# Patient Record
Sex: FEMALE | Race: WHITE | NOT HISPANIC OR LATINO | Employment: FULL TIME | URBAN - METROPOLITAN AREA
[De-identification: names, ages, dates, MRNs, and addresses within clinical notes are randomized per-mention and may not be internally consistent; named-entity substitution may affect disease eponyms.]

---

## 2017-09-29 ENCOUNTER — OFFICE VISIT (OUTPATIENT)
Dept: FAMILY MEDICINE | Facility: CLINIC | Age: 47
End: 2017-09-29
Payer: COMMERCIAL

## 2017-09-29 ENCOUNTER — LAB VISIT (OUTPATIENT)
Dept: LAB | Facility: HOSPITAL | Age: 47
End: 2017-09-29
Attending: FAMILY MEDICINE
Payer: COMMERCIAL

## 2017-09-29 VITALS
HEART RATE: 84 BPM | SYSTOLIC BLOOD PRESSURE: 110 MMHG | BODY MASS INDEX: 35.63 KG/M2 | TEMPERATURE: 98 F | OXYGEN SATURATION: 97 % | WEIGHT: 188.69 LBS | DIASTOLIC BLOOD PRESSURE: 80 MMHG | HEIGHT: 61 IN

## 2017-09-29 DIAGNOSIS — Z12.31 ENCOUNTER FOR SCREENING MAMMOGRAM FOR BREAST CANCER: ICD-10-CM

## 2017-09-29 DIAGNOSIS — Z12.4 CERVICAL CANCER SCREENING: ICD-10-CM

## 2017-09-29 DIAGNOSIS — Z00.00 ANNUAL PHYSICAL EXAM: Primary | ICD-10-CM

## 2017-09-29 DIAGNOSIS — Z00.00 ANNUAL PHYSICAL EXAM: ICD-10-CM

## 2017-09-29 LAB
ALBUMIN SERPL BCP-MCNC: 3.6 G/DL
ALP SERPL-CCNC: 82 U/L
ALT SERPL W/O P-5'-P-CCNC: 16 U/L
ANION GAP SERPL CALC-SCNC: 7 MMOL/L
AST SERPL-CCNC: 19 U/L
BASOPHILS # BLD AUTO: 0.03 K/UL
BASOPHILS NFR BLD: 0.5 %
BILIRUB SERPL-MCNC: 0.6 MG/DL
BUN SERPL-MCNC: 7 MG/DL
CALCIUM SERPL-MCNC: 9 MG/DL
CHLORIDE SERPL-SCNC: 107 MMOL/L
CHOLEST SERPL-MCNC: 207 MG/DL
CHOLEST/HDLC SERPL: 5.6 {RATIO}
CO2 SERPL-SCNC: 25 MMOL/L
CREAT SERPL-MCNC: 0.9 MG/DL
DIFFERENTIAL METHOD: ABNORMAL
EOSINOPHIL # BLD AUTO: 0.1 K/UL
EOSINOPHIL NFR BLD: 1.7 %
ERYTHROCYTE [DISTWIDTH] IN BLOOD BY AUTOMATED COUNT: 13.8 %
EST. GFR  (AFRICAN AMERICAN): >60 ML/MIN/1.73 M^2
EST. GFR  (NON AFRICAN AMERICAN): >60 ML/MIN/1.73 M^2
GLUCOSE SERPL-MCNC: 79 MG/DL
HCT VFR BLD AUTO: 39.2 %
HDLC SERPL-MCNC: 37 MG/DL
HDLC SERPL: 17.9 %
HGB BLD-MCNC: 12.3 G/DL
LDLC SERPL CALC-MCNC: 137.6 MG/DL
LYMPHOCYTES # BLD AUTO: 1.7 K/UL
LYMPHOCYTES NFR BLD: 27.6 %
MCH RBC QN AUTO: 26.4 PG
MCHC RBC AUTO-ENTMCNC: 31.4 G/DL
MCV RBC AUTO: 84 FL
MONOCYTES # BLD AUTO: 0.4 K/UL
MONOCYTES NFR BLD: 6.5 %
NEUTROPHILS # BLD AUTO: 3.8 K/UL
NEUTROPHILS NFR BLD: 63.7 %
NONHDLC SERPL-MCNC: 170 MG/DL
PLATELET # BLD AUTO: 269 K/UL
PMV BLD AUTO: 10.8 FL
POTASSIUM SERPL-SCNC: 4.3 MMOL/L
PROT SERPL-MCNC: 6.9 G/DL
RBC # BLD AUTO: 4.66 M/UL
SODIUM SERPL-SCNC: 139 MMOL/L
TRIGL SERPL-MCNC: 162 MG/DL
TSH SERPL DL<=0.005 MIU/L-ACNC: 2.17 UIU/ML
WBC # BLD AUTO: 6.01 K/UL

## 2017-09-29 PROCEDURE — 99999 PR PBB SHADOW E&M-EST. PATIENT-LVL III: CPT | Mod: PBBFAC,,, | Performed by: FAMILY MEDICINE

## 2017-09-29 PROCEDURE — 80061 LIPID PANEL: CPT

## 2017-09-29 PROCEDURE — 36415 COLL VENOUS BLD VENIPUNCTURE: CPT | Mod: PO

## 2017-09-29 PROCEDURE — 88175 CYTOPATH C/V AUTO FLUID REDO: CPT

## 2017-09-29 PROCEDURE — 80053 COMPREHEN METABOLIC PANEL: CPT

## 2017-09-29 PROCEDURE — 84443 ASSAY THYROID STIM HORMONE: CPT

## 2017-09-29 PROCEDURE — 87624 HPV HI-RISK TYP POOLED RSLT: CPT

## 2017-09-29 PROCEDURE — 99396 PREV VISIT EST AGE 40-64: CPT | Mod: S$GLB,,, | Performed by: FAMILY MEDICINE

## 2017-09-29 PROCEDURE — 85025 COMPLETE CBC W/AUTO DIFF WBC: CPT

## 2017-09-29 NOTE — PROGRESS NOTES
Office Visit    Patient Name: Cheryl Espinosa    : 1970  MRN: [id number]    Subjective:  Cheryl is a 46 y.o. female who presents today for:    Annual Exam      This patient has multiple medical diagnoses as noted below.  This patient is known to me and to this clinic. Patient denies any new symptoms including chest pain, SOB, blurry vision, N/V, diarrhea.  She denies any new issues at this time.       Patient Active Problem List   Diagnosis    Osteopenia    History of endometriosis    GERD without esophagitis       History reviewed. No pertinent surgical history.    History reviewed. No pertinent family history.    Social History     Social History    Marital status: Single     Spouse name: N/A    Number of children: N/A    Years of education: N/A     Occupational History    Not on file.     Social History Main Topics    Smoking status: Never Smoker    Smokeless tobacco: Never Used    Alcohol use Yes    Drug use: No    Sexual activity: Not on file     Other Topics Concern    Not on file     Social History Narrative    No narrative on file       Current Medications  Medications reviewed and updated.     Allergies   Review of patient's allergies indicates:   Allergen Reactions    Nsaids (non-steroidal anti-inflammatory drug) Nausea And Vomiting     Nausea     Medrol [methylprednisolone] Rash         Labs  No results found for: LABA1C, HGBA1C  Lab Results   Component Value Date     2016    K 3.8 2016     2016    CO2 24 2016    BUN 10 2016    CREATININE 0.9 2016    CALCIUM 9.1 2016    ANIONGAP 9 2016    ESTGFRAFRICA >60.0 2016    EGFRNONAA >60.0 2016     No results found for: CHOL  No results found for: HDL  No results found for: LDLCALC  No results found for: TRIG  No results found for: CHOLHDL  Last set of blood work has been reviewed as noted above.    Review of Systems   Constitutional: Negative for activity  "change, appetite change, fatigue, fever and unexpected weight change.   HENT: Negative.  Negative for ear discharge, ear pain, rhinorrhea and sore throat.    Eyes: Negative.    Respiratory: Negative for apnea, cough, chest tightness, shortness of breath and wheezing.    Cardiovascular: Negative for chest pain, palpitations and leg swelling.   Gastrointestinal: Negative for abdominal distention, abdominal pain, constipation, diarrhea and vomiting.   Endocrine: Negative for cold intolerance, heat intolerance, polydipsia and polyuria.   Genitourinary: Negative for decreased urine volume, menstrual problem, urgency, vaginal bleeding, vaginal discharge and vaginal pain.   Musculoskeletal: Negative.    Skin: Negative for rash.   Neurological: Negative for dizziness and headaches.   Hematological: Does not bruise/bleed easily.   Psychiatric/Behavioral: Negative for agitation, sleep disturbance and suicidal ideas.       /80 (BP Location: Left arm, Patient Position: Sitting, BP Method: Large (Manual))   Pulse 84   Temp 98.1 °F (36.7 °C) (Oral)   Ht 5' 1" (1.549 m)   Wt 85.6 kg (188 lb 11.4 oz)   LMP 04/29/2017 (Approximate)   SpO2 97%   BMI 35.66 kg/m²      Physical Exam   Constitutional: She is oriented to person, place, and time. She appears well-developed and well-nourished.   HENT:   Head: Normocephalic and atraumatic.   Right Ear: External ear normal.   Left Ear: External ear normal.   Nose: Nose normal.   Mouth/Throat: Oropharynx is clear and moist.   Eyes: Conjunctivae and EOM are normal. Pupils are equal, round, and reactive to light.   Neck: Normal range of motion. No JVD present. No thyromegaly present.   Cardiovascular: Normal rate, regular rhythm and normal heart sounds.    Pulmonary/Chest: Effort normal and breath sounds normal. She has no wheezes. Right breast exhibits no inverted nipple, no mass, no nipple discharge, no skin change and no tenderness. Left breast exhibits no inverted nipple, no " mass, no nipple discharge, no skin change and no tenderness.   Abdominal: Soft. Bowel sounds are normal. She exhibits no distension. There is no tenderness.   Genitourinary: Vagina normal and uterus normal. Pelvic exam was performed with patient supine. There is no rash, tenderness or lesion on the right labia. There is no rash, tenderness, lesion or injury on the left labia. Cervix exhibits no discharge and no friability. Right adnexum displays no mass, no tenderness and no fullness. Left adnexum displays no mass, no tenderness and no fullness.   Musculoskeletal: Normal range of motion.   Lymphadenopathy:     She has no cervical adenopathy.   Neurological: She is alert and oriented to person, place, and time. She has normal reflexes.   Skin: Skin is warm and dry.   Psychiatric: She has a normal mood and affect. Her behavior is normal. Judgment and thought content normal.   Vitals reviewed.      Health Maintenance  Health Maintenance       Date Due Completion Date    Lipid Panel 1970 ---    TETANUS VACCINE 11/17/1990 ---    Pap Smear with HPV Cotest 11/17/1993 ---    Mammogram 11/17/2012 ---    Influenza Vaccine 08/01/2017 ---          Assessment/Plan:  Cheryl Espinosa is a 46 y.o. female who presents today for :    1. Annual physical exam    2. Encounter for screening mammogram for breast cancer    3. Cervical cancer screening        Problem List Items Addressed This Visit     None      Visit Diagnoses     Annual physical exam    -  Primary    Relevant Orders    CBC auto differential    Comprehensive metabolic panel    Lipid panel    TSH    Encounter for screening mammogram for breast cancer        Relevant Orders    Mammo Digital Screening Bilat with Tomosynthesis CAD    Cervical cancer screening        Relevant Orders    Liquid-based pap smear, screening    HPV High Risk Genotypes, PCR          Return in about 1 year (around 9/29/2018).     This note was created by combination of typed  and  Dragon dictation.  Transcription errors may be present.  If there are any questions, please contact me.

## 2017-10-02 ENCOUNTER — HOSPITAL ENCOUNTER (OUTPATIENT)
Dept: RADIOLOGY | Facility: HOSPITAL | Age: 47
Discharge: HOME | End: 2017-10-02
Attending: FAMILY MEDICINE
Payer: COMMERCIAL

## 2017-10-02 DIAGNOSIS — Z12.31 ENCOUNTER FOR SCREENING MAMMOGRAM FOR BREAST CANCER: ICD-10-CM

## 2017-10-02 PROCEDURE — 77063 BREAST TOMOSYNTHESIS BI: CPT | Mod: 26,,, | Performed by: RADIOLOGY

## 2017-10-02 PROCEDURE — 77067 SCR MAMMO BI INCL CAD: CPT | Mod: 26,,, | Performed by: RADIOLOGY

## 2017-10-02 PROCEDURE — 77067 SCR MAMMO BI INCL CAD: CPT | Mod: TC

## 2017-10-05 LAB
HPV HR 12 DNA CVX QL NAA+PROBE: NEGATIVE
HPV16 DNA SPEC QL NAA+PROBE: NEGATIVE
HPV18 DNA SPEC QL NAA+PROBE: NEGATIVE

## 2018-01-19 ENCOUNTER — OFFICE VISIT (OUTPATIENT)
Dept: FAMILY MEDICINE | Facility: CLINIC | Age: 48
End: 2018-01-19
Payer: COMMERCIAL

## 2018-01-19 VITALS
HEIGHT: 61 IN | WEIGHT: 195.13 LBS | OXYGEN SATURATION: 96 % | BODY MASS INDEX: 36.84 KG/M2 | DIASTOLIC BLOOD PRESSURE: 86 MMHG | HEART RATE: 110 BPM | SYSTOLIC BLOOD PRESSURE: 126 MMHG | TEMPERATURE: 98 F

## 2018-01-19 DIAGNOSIS — F41.9 ANXIETY: Primary | ICD-10-CM

## 2018-01-19 PROCEDURE — 99213 OFFICE O/P EST LOW 20 MIN: CPT | Mod: S$GLB,,, | Performed by: NURSE PRACTITIONER

## 2018-01-19 PROCEDURE — 99999 PR PBB SHADOW E&M-EST. PATIENT-LVL III: CPT | Mod: PBBFAC,,, | Performed by: NURSE PRACTITIONER

## 2018-01-19 NOTE — PROGRESS NOTES
Subjective:       Patient ID: Cheryl Espinosa is a 47 y.o. female.    Chief Complaint: Epistaxis    Epistaxis    The bleeding has been from both nares. This is a new problem. The current episode started in the past 7 days. She has experienced no nasal trauma. The problem occurs daily. The problem has been resolved. The bleeding is associated with nothing. She has tried nothing for the symptoms. The treatment provided no relief. There is no history of allergies, a bleeding disorder, colds, frequent nosebleeds or sinus problems.   Anxiety   Presents for initial visit. Onset was 1 to 4 weeks ago. The problem has been gradually worsening. Symptoms include decreased concentration, excessive worry, nervous/anxious behavior, restlessness and shortness of breath. Patient reports no chest pain, compulsions, confusion, depressed mood, dizziness, dry mouth, feeling of choking, hyperventilation, impotence, insomnia, irritability, malaise, muscle tension, nausea, obsessions, palpitations, panic or suicidal ideas. Symptoms occur constantly. The severity of symptoms is moderate. The symptoms are aggravated by work stress (Patient states she works in a stressful environment ). The quality of sleep is fair. Nighttime awakenings: occasional.     Risk factors: Work Stress. There is no history of anemia, anxiety/panic attacks, arrhythmia, asthma, bipolar disorder, CAD, CHF, chronic lung disease, depression, fibromyalgia, hyperthyroidism or suicide attempts. Past treatments include nothing.     Review of Systems   Constitutional: Negative for chills, diaphoresis, fatigue, fever and irritability.   HENT: Positive for nosebleeds. Negative for congestion, postnasal drip, rhinorrhea, sinus pressure and sneezing.    Respiratory: Positive for shortness of breath. Negative for cough, chest tightness and wheezing.    Cardiovascular: Negative for chest pain, palpitations and leg swelling.   Gastrointestinal: Negative for abdominal pain,  constipation, diarrhea, nausea and vomiting.   Genitourinary: Negative for dysuria, impotence, vaginal discharge and vaginal pain.   Musculoskeletal: Negative for arthralgias, back pain and myalgias.   Skin: Negative for color change and rash.   Neurological: Negative for dizziness, weakness, light-headedness and headaches.   Psychiatric/Behavioral: Positive for decreased concentration. Negative for confusion and suicidal ideas. The patient is nervous/anxious. The patient does not have insomnia.        Objective:      Physical Exam   Constitutional: She is oriented to person, place, and time. Vital signs are normal. She appears well-developed and well-nourished.   HENT:   Head: Normocephalic and atraumatic.   Right Ear: External ear normal.   Left Ear: External ear normal.   Nose: Nose normal.   Mouth/Throat: Oropharynx is clear and moist. No oropharyngeal exudate.   Cardiovascular: Normal rate, regular rhythm and normal heart sounds.    Pulmonary/Chest: Effort normal and breath sounds normal.   Neurological: She is alert and oriented to person, place, and time.   Skin: Skin is warm, dry and intact. Capillary refill takes less than 2 seconds.   Psychiatric: Judgment normal. Her mood appears anxious. Cognition and memory are normal.   Patient is crying through out the entire visit       Assessment:       1. Anxiety        Plan:       Cheryl was seen today for epistaxis.    Diagnoses and all orders for this visit:    Anxiety    Patient refused medication. Patient will see Dr Strong Feb. 9, 2018.  Home care  · Try to locate the sources of stress in your life. They may not be obvious. These may include:  ¨ Daily hassles of life (traffic jams, missed appointments, car troubles, etc.)  ¨ Major life changes, both good (new baby, job promotion) and bad (loss of job, loss of loved one)  ¨ Overload: feeling that you have too many responsibilities and can't take care of all of them at once  ¨ Feeling helpless, feeling  that your problems are beyond what youre able to solve  · Notice how your body reacts to stress. Learn to listen to your body signals. This will help you take action before the stress becomes severe.  · When you can, do something about the source of your stress. (Avoid hassles, limit the amount of change that happens in your life at one time and take a break when you feel overloaded).  · Unfortunately, many stressful situations can't be avoided. It is necessary to learn how to better manage stress. There are many proven methods that will reduce your anxiety. These include simple things like exercise, good nutrition and adequate rest. Also, there are certain techniques that are helpful:  ¨ Relaxation  ¨ Breathing exercises  ¨ Visualization  ¨ Biofeedback  ¨ Meditation  For more information about this, consult your doctor or go to a local bookstore and review the many books and tapes available on this subject.  Follow-up care  If you feel that your anxiety is not responding to self-help measures, contact your doctor or make an appointment with a counselor. You may need short-term psychological counseling and temporary medicine to help you manage stress.  Call 911  Call your healthcare provider right away if any of these occur:  · Trouble breathing  · Confusion  · Drowsiness or trouble wakening  · Fainting or loss of consciousness  · Rapid heart rate  · Seizure  · New chest pain that becomes more severe, lasts longer, or spreads into your shoulder, arm, neck, jaw, or back  When to seek medical advice  Call your healthcare provider right away if any of these occur:  · Your symptoms get worse  · Severe headache not relieved by rest and mild pain reliever  Date Last Reviewed: 9/29/2015 © 2000-2017 [employer]. [address] [place] [address], [place] [address]. All rights reserved. This information is not intended as a substitute for professional medical care. Always follow your healthcare professional's  instructions.

## 2018-01-19 NOTE — PATIENT INSTRUCTIONS
Anxiety Reaction  Anxiety is the feeling we all get when we think something bad might happen. It is a normal response to stress and usually causes only a mild reaction. When anxiety becomes more severe, it can interfere with daily life. In some cases, you may not even be aware of what it is youre anxious about. There may also be a genetic link or it may be a learned behavior in the home.  Both psychological and physical triggers cause stress reaction. It's often a response to fear or emotional stress, real or imagined. This stress may come from home, family, work, or social relationships.  During an anxiety reaction, you may feel:  · Helpless  · Nervous  · Depressed  · Irritable  Your body may show signs of anxiety in many ways. You may experience:  · Dry mouth  · Shakiness  · Dizziness  · Weakness  · Trouble breathing  · Breathing fast (hyperventilating)  · Chest pressure  · Sweating  · Headache  · Nausea  · Diarrhea  · Tiredness  · Inability to sleep  · Sexual problems  Home care  · Try to locate the sources of stress in your life. They may not be obvious. These may include:  ¨ Daily hassles of life (traffic jams, missed appointments, car troubles, etc.)  ¨ Major life changes, both good (new baby, job promotion) and bad (loss of job, loss of loved one)  ¨ Overload: feeling that you have too many responsibilities and can't take care of all of them at once  ¨ Feeling helpless, feeling that your problems are beyond what youre able to solve  · Notice how your body reacts to stress. Learn to listen to your body signals. This will help you take action before the stress becomes severe.  · When you can, do something about the source of your stress. (Avoid hassles, limit the amount of change that happens in your life at one time and take a break when you feel overloaded).  · Unfortunately, many stressful situations can't be avoided. It is necessary to learn how to better manage stress. There are many proven methods  that will reduce your anxiety. These include simple things like exercise, good nutrition and adequate rest. Also, there are certain techniques that are helpful:  ¨ Relaxation  ¨ Breathing exercises  ¨ Visualization  ¨ Biofeedback  ¨ Meditation  For more information about this, consult your doctor or go to a local bookstore and review the many books and tapes available on this subject.  Follow-up care  If you feel that your anxiety is not responding to self-help measures, contact your doctor or make an appointment with a counselor. You may need short-term psychological counseling and temporary medicine to help you manage stress.  Call 911  Call your healthcare provider right away if any of these occur:  · Trouble breathing  · Confusion  · Drowsiness or trouble wakening  · Fainting or loss of consciousness  · Rapid heart rate  · Seizure  · New chest pain that becomes more severe, lasts longer, or spreads into your shoulder, arm, neck, jaw, or back  When to seek medical advice  Call your healthcare provider right away if any of these occur:  · Your symptoms get worse  · Severe headache not relieved by rest and mild pain reliever  Date Last Reviewed: 9/29/2015  © 2404-7014 [employer]. [address] [place] [address]. All rights reserved. This information is not intended as a substitute for professional medical care. Always follow your healthcare professional's instructions.

## 2018-02-09 ENCOUNTER — OFFICE VISIT (OUTPATIENT)
Dept: FAMILY MEDICINE | Facility: CLINIC | Age: 48
End: 2018-02-09
Payer: COMMERCIAL

## 2018-02-09 VITALS
HEIGHT: 61 IN | SYSTOLIC BLOOD PRESSURE: 128 MMHG | WEIGHT: 190.94 LBS | DIASTOLIC BLOOD PRESSURE: 82 MMHG | TEMPERATURE: 98 F | HEART RATE: 93 BPM | OXYGEN SATURATION: 95 % | BODY MASS INDEX: 36.05 KG/M2

## 2018-02-09 DIAGNOSIS — Z87.42 HISTORY OF ENDOMETRIOSIS: ICD-10-CM

## 2018-02-09 DIAGNOSIS — N92.1 MENORRHAGIA WITH IRREGULAR CYCLE: ICD-10-CM

## 2018-02-09 DIAGNOSIS — Z23 NEED FOR TDAP VACCINATION: ICD-10-CM

## 2018-02-09 DIAGNOSIS — F41.8 DEPRESSION WITH ANXIETY: Primary | ICD-10-CM

## 2018-02-09 PROCEDURE — 3008F BODY MASS INDEX DOCD: CPT | Mod: S$GLB,,, | Performed by: FAMILY MEDICINE

## 2018-02-09 PROCEDURE — 99214 OFFICE O/P EST MOD 30 MIN: CPT | Mod: S$GLB,,, | Performed by: FAMILY MEDICINE

## 2018-02-09 PROCEDURE — 99999 PR PBB SHADOW E&M-EST. PATIENT-LVL III: CPT | Mod: PBBFAC,,, | Performed by: FAMILY MEDICINE

## 2018-02-09 RX ORDER — DIAZEPAM 5 MG/1
5 TABLET ORAL EVERY 6 HOURS PRN
Qty: 60 TABLET | Refills: 0 | Status: SHIPPED | OUTPATIENT
Start: 2018-02-09 | End: 2018-02-09 | Stop reason: SDUPTHER

## 2018-02-09 RX ORDER — DIAZEPAM 5 MG/1
5 TABLET ORAL EVERY 6 HOURS PRN
Qty: 60 TABLET | Refills: 0 | Status: SHIPPED | OUTPATIENT
Start: 2018-02-09 | End: 2019-03-15
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
## 2018-03-01 ENCOUNTER — TELEPHONE (OUTPATIENT)
Dept: FAMILY MEDICINE | Facility: CLINIC | Age: 48
End: 2018-03-01

## 2018-03-01 NOTE — TELEPHONE ENCOUNTER
----- Message from Dada Campos sent at 2/26/2018 10:42 AM CST -----  Contact: Cheryl [phone]  P.A is needed for the pt's medication:  diazePAM (VALIUM) 5 MG tablet . Please call at your earliest convenience.

## 2018-04-16 ENCOUNTER — PATIENT MESSAGE (OUTPATIENT)
Dept: FAMILY MEDICINE | Facility: CLINIC | Age: 48
End: 2018-04-16

## 2018-04-16 ENCOUNTER — TELEPHONE (OUTPATIENT)
Dept: FAMILY MEDICINE | Facility: CLINIC | Age: 48
End: 2018-04-16

## 2018-04-16 DIAGNOSIS — K21.9 GERD WITHOUT ESOPHAGITIS: Primary | ICD-10-CM

## 2018-04-16 RX ORDER — DIAZEPAM 5 MG/1
5 TABLET ORAL EVERY 6 HOURS PRN
Qty: 60 TABLET | Refills: 0 | Status: CANCELLED | OUTPATIENT
Start: 2018-04-16 | End: 2018-05-16

## 2018-04-16 RX ORDER — OMEPRAZOLE 40 MG/1
40 CAPSULE, DELAYED RELEASE ORAL EVERY MORNING
Qty: 90 CAPSULE | Refills: 0 | Status: SHIPPED | OUTPATIENT
Start: 2018-04-16 | End: 2018-04-17 | Stop reason: SDUPTHER

## 2018-04-16 NOTE — TELEPHONE ENCOUNTER
Patient notified prescription sent to the pharmacy,and the referral dept will contact her to schedule an appt for GI. Patient verbalized her understanding.

## 2018-04-16 NOTE — TELEPHONE ENCOUNTER
Patient notified PA approved for brand name Valium from 04/16/18 through 04/16/19. Patient verbalized her understanding.Reference # [id number]

## 2018-04-17 DIAGNOSIS — K21.9 GERD WITHOUT ESOPHAGITIS: ICD-10-CM

## 2018-04-17 RX ORDER — OMEPRAZOLE 40 MG/1
40 CAPSULE, DELAYED RELEASE ORAL EVERY MORNING
Qty: 90 CAPSULE | Refills: 3 | Status: SHIPPED | OUTPATIENT
Start: 2018-04-17 | End: 2020-10-26 | Stop reason: ALTCHOICE

## 2018-04-19 ENCOUNTER — TELEPHONE (OUTPATIENT)
Dept: FAMILY MEDICINE | Facility: CLINIC | Age: 48
End: 2018-04-19

## 2018-04-19 NOTE — TELEPHONE ENCOUNTER
I spoke with the patient regarding a referral to Gastroenterology, she refuse to schedule first available at [facility] and [place] locations. Patient was given the information regarding the gastro department and states that she will be doing research before scheduling.

## 2019-03-15 ENCOUNTER — OFFICE VISIT (OUTPATIENT)
Dept: FAMILY MEDICINE | Facility: CLINIC | Age: 49
End: 2019-03-15
Payer: COMMERCIAL

## 2019-03-15 ENCOUNTER — LAB VISIT (OUTPATIENT)
Dept: LAB | Facility: HOSPITAL | Age: 49
End: 2019-03-15
Attending: FAMILY MEDICINE
Payer: COMMERCIAL

## 2019-03-15 VITALS
BODY MASS INDEX: 34.93 KG/M2 | HEIGHT: 61 IN | OXYGEN SATURATION: 98 % | HEART RATE: 80 BPM | SYSTOLIC BLOOD PRESSURE: 110 MMHG | TEMPERATURE: 98 F | DIASTOLIC BLOOD PRESSURE: 70 MMHG | WEIGHT: 185 LBS

## 2019-03-15 DIAGNOSIS — Z00.00 ANNUAL PHYSICAL EXAM: Primary | ICD-10-CM

## 2019-03-15 DIAGNOSIS — Z12.31 ENCOUNTER FOR SCREENING MAMMOGRAM FOR BREAST CANCER: ICD-10-CM

## 2019-03-15 DIAGNOSIS — Z00.00 ANNUAL PHYSICAL EXAM: ICD-10-CM

## 2019-03-15 LAB
ALBUMIN SERPL BCP-MCNC: 4 G/DL
ALP SERPL-CCNC: 89 U/L
ALT SERPL W/O P-5'-P-CCNC: 17 U/L
ANION GAP SERPL CALC-SCNC: 7 MMOL/L
AST SERPL-CCNC: 19 U/L
BASOPHILS # BLD AUTO: 0.05 K/UL
BASOPHILS NFR BLD: 0.9 %
BILIRUB SERPL-MCNC: 0.4 MG/DL
BUN SERPL-MCNC: 16 MG/DL
CALCIUM SERPL-MCNC: 9.5 MG/DL
CHLORIDE SERPL-SCNC: 106 MMOL/L
CHOLEST SERPL-MCNC: 231 MG/DL
CHOLEST/HDLC SERPL: 4.9 {RATIO}
CO2 SERPL-SCNC: 26 MMOL/L
CREAT SERPL-MCNC: 0.9 MG/DL
DIFFERENTIAL METHOD: ABNORMAL
EOSINOPHIL # BLD AUTO: 0.1 K/UL
EOSINOPHIL NFR BLD: 1.7 %
ERYTHROCYTE [DISTWIDTH] IN BLOOD BY AUTOMATED COUNT: 13.2 %
EST. GFR  (AFRICAN AMERICAN): >60 ML/MIN/1.73 M^2
EST. GFR  (NON AFRICAN AMERICAN): >60 ML/MIN/1.73 M^2
ESTIMATED AVG GLUCOSE: 100 MG/DL
GLUCOSE SERPL-MCNC: 93 MG/DL
HBA1C MFR BLD HPLC: 5.1 %
HCT VFR BLD AUTO: 41.4 %
HDLC SERPL-MCNC: 47 MG/DL
HDLC SERPL: 20.3 %
HGB BLD-MCNC: 13 G/DL
IMM GRANULOCYTES # BLD AUTO: 0.01 K/UL
IMM GRANULOCYTES NFR BLD AUTO: 0.2 %
LDLC SERPL CALC-MCNC: 159.6 MG/DL
LYMPHOCYTES # BLD AUTO: 1.8 K/UL
LYMPHOCYTES NFR BLD: 31 %
MCH RBC QN AUTO: 26.7 PG
MCHC RBC AUTO-ENTMCNC: 31.4 G/DL
MCV RBC AUTO: 85 FL
MONOCYTES # BLD AUTO: 0.4 K/UL
MONOCYTES NFR BLD: 7.3 %
NEUTROPHILS # BLD AUTO: 3.5 K/UL
NEUTROPHILS NFR BLD: 58.9 %
NONHDLC SERPL-MCNC: 184 MG/DL
NRBC BLD-RTO: 0 /100 WBC
PLATELET # BLD AUTO: 317 K/UL
PMV BLD AUTO: 10.8 FL
POTASSIUM SERPL-SCNC: 4.4 MMOL/L
PROT SERPL-MCNC: 7.3 G/DL
RBC # BLD AUTO: 4.86 M/UL
SODIUM SERPL-SCNC: 139 MMOL/L
TRIGL SERPL-MCNC: 122 MG/DL
TSH SERPL DL<=0.005 MIU/L-ACNC: 1.41 UIU/ML
WBC # BLD AUTO: 5.87 K/UL

## 2019-03-15 PROCEDURE — 85025 COMPLETE CBC W/AUTO DIFF WBC: CPT

## 2019-03-15 PROCEDURE — 36415 COLL VENOUS BLD VENIPUNCTURE: CPT | Mod: PO

## 2019-03-15 PROCEDURE — 83036 HEMOGLOBIN GLYCOSYLATED A1C: CPT

## 2019-03-15 PROCEDURE — 99999 PR PBB SHADOW E&M-EST. PATIENT-LVL III: ICD-10-PCS | Mod: PBBFAC,,, | Performed by: FAMILY MEDICINE

## 2019-03-15 PROCEDURE — 84443 ASSAY THYROID STIM HORMONE: CPT

## 2019-03-15 PROCEDURE — 80061 LIPID PANEL: CPT

## 2019-03-15 PROCEDURE — 99396 PREV VISIT EST AGE 40-64: CPT | Mod: S$GLB,,, | Performed by: FAMILY MEDICINE

## 2019-03-15 PROCEDURE — 99999 PR PBB SHADOW E&M-EST. PATIENT-LVL III: CPT | Mod: PBBFAC,,, | Performed by: FAMILY MEDICINE

## 2019-03-15 PROCEDURE — 99396 PR PREVENTIVE VISIT,EST,40-64: ICD-10-PCS | Mod: S$GLB,,, | Performed by: FAMILY MEDICINE

## 2019-03-15 PROCEDURE — 80053 COMPREHEN METABOLIC PANEL: CPT

## 2019-03-15 NOTE — PROGRESS NOTES
Assessment & Plan  Problem List Items Addressed This Visit     None      Visit Diagnoses     Annual physical exam    -  Primary    Relevant Orders    CBC auto differential    Comprehensive metabolic panel    Lipid panel    Hemoglobin A1c    TSH    Mammo Digital Screening Bilat    Encounter for screening mammogram for breast cancer        Relevant Orders    Mammo Digital Screening Bilat      I addressed all major concerns as it related to health maintenance.  All were ordered and scheduled based on the patients wishes.  Any additional health maintenance will be readdressed at the next physical if declined or deferred by the patient.        Health Maintenance reviewed.    Follow-up: Follow-up in about 1 year (around 3/15/2020) for annual exam.    ______________________________________________________________________    Chief Complaint  Chief Complaint   Patient presents with    Annual Exam       HPI  Cheryl Espinosa is a 48 y.o. female with multiple medical diagnoses as listed in the medical history and problem list that presents for annual exam.  Pt is known to me with last appointment 2/9/2018.    Patient denies any new symptoms including chest pain, SOB, blurry vision, N/V, diarrhea.        PAST MEDICAL HISTORY:  Past Medical History:   Diagnosis Date    Carpal tunnel syndrome     GERD (gastroesophageal reflux disease)     History of degenerative disc disease     Metatarsalgia        PAST SURGICAL HISTORY:  History reviewed. No pertinent surgical history.    SOCIAL HISTORY:  Social History     Socioeconomic History    Marital status: Single     Spouse name: Not on file    Number of children: Not on file    Years of education: Not on file    Highest education level: Not on file   Social Needs    Financial resource strain: Not on file    Food insecurity - worry: Not on file    Food insecurity - inability: Not on file    Transportation needs - medical: Not on file    Transportation needs -  non-medical: Not on file   Occupational History    Not on file   Tobacco Use    Smoking status: Never Smoker    Smokeless tobacco: Never Used   Substance and Sexual Activity    Alcohol use: Yes    Drug use: No    Sexual activity: Not on file   Other Topics Concern    Not on file   Social History Narrative    Not on file       FAMILY HISTORY:  Family History   Problem Relation Age of Onset    Breast cancer Maternal Aunt     Breast cancer Maternal Grandmother        ALLERGIES AND MEDICATIONS: updated and reviewed.  Review of patient's allergies indicates:   Allergen Reactions    Nsaids (non-steroidal anti-inflammatory drug) Nausea And Vomiting     Nausea     Medrol [methylprednisolone] Rash     Current Outpatient Medications   Medication Sig Dispense Refill    omeprazole (PRILOSEC) 40 MG capsule Take 1 capsule (40 mg total) by mouth every morning. 90 capsule 3     No current facility-administered medications for this visit.          ROS  Review of Systems   Constitutional: Negative for activity change, appetite change, fatigue, fever and unexpected weight change.   HENT: Negative.  Negative for ear discharge, ear pain, rhinorrhea and sore throat.    Eyes: Negative.    Respiratory: Negative for apnea, cough, chest tightness, shortness of breath and wheezing.    Cardiovascular: Negative for chest pain, palpitations and leg swelling.   Gastrointestinal: Negative for abdominal distention, abdominal pain, constipation, diarrhea and vomiting.   Endocrine: Negative for cold intolerance, heat intolerance, polydipsia and polyuria.   Genitourinary: Negative for decreased urine volume and urgency.   Musculoskeletal: Negative.    Skin: Negative for rash.   Neurological: Negative for dizziness and headaches.   Hematological: Does not bruise/bleed easily.   Psychiatric/Behavioral: Negative for agitation, sleep disturbance and suicidal ideas.         Physical Exam  Vitals:    03/15/19 1353   BP: 110/70   BP Location:  "Left arm   Patient Position: Sitting   BP Method: Large (Manual)   Pulse: 80   Temp: 98.3 °F (36.8 °C)   TempSrc: Oral   SpO2: 98%   Weight: 83.9 kg (185 lb)   Height: 5' 1" (1.549 m)    Body mass index is 34.96 kg/m².  Weight: 83.9 kg (185 lb)   Height: 5' 1" (154.9 cm)   Physical Exam   Constitutional: She is oriented to person, place, and time. She appears well-developed and well-nourished.   HENT:   Head: Normocephalic and atraumatic.   Right Ear: External ear normal.   Left Ear: External ear normal.   Nose: Nose normal.   Mouth/Throat: Oropharynx is clear and moist.   Eyes: Conjunctivae and EOM are normal. Pupils are equal, round, and reactive to light.   Neck: Normal range of motion. No JVD present. No thyromegaly present.   Cardiovascular: Normal rate, regular rhythm and normal heart sounds.   Pulmonary/Chest: Effort normal and breath sounds normal. She has no wheezes.   Abdominal: Soft. Bowel sounds are normal. She exhibits no distension. There is no tenderness.   Musculoskeletal: Normal range of motion.   Lymphadenopathy:     She has no cervical adenopathy.   Neurological: She is alert and oriented to person, place, and time. She has normal reflexes.   Skin: Skin is warm and dry.   Psychiatric: She has a normal mood and affect. Her behavior is normal. Judgment and thought content normal.   Vitals reviewed.        Health Maintenance       Date Due Completion Date    TETANUS VACCINE 11/17/1990 ---    Influenza Vaccine 05/03/2019 (Originally 8/1/2018) 2/9/2018 (Declined)    Override on 2/9/2018: Declined    Mammogram 10/02/2019 10/2/2017    Pap Smear with HPV Cotest 09/29/2020 9/29/2017    Lipid Panel 09/29/2022 9/29/2017              Patient note was created using [employer]  Any errors in syntax or even information may not have been identified and edited on initial review prior to signing this note.  "

## 2019-03-18 ENCOUNTER — HOSPITAL ENCOUNTER (OUTPATIENT)
Dept: RADIOLOGY | Facility: HOSPITAL | Age: 49
Discharge: HOME | End: 2019-03-18
Attending: FAMILY MEDICINE
Payer: COMMERCIAL

## 2019-03-18 VITALS — WEIGHT: 185 LBS | BODY MASS INDEX: 34.93 KG/M2 | HEIGHT: 61 IN

## 2019-03-18 DIAGNOSIS — Z00.00 ANNUAL PHYSICAL EXAM: ICD-10-CM

## 2019-03-18 DIAGNOSIS — Z12.31 ENCOUNTER FOR SCREENING MAMMOGRAM FOR BREAST CANCER: ICD-10-CM

## 2019-03-18 PROCEDURE — 77063 MAMMO DIGITAL SCREENING BILAT WITH TOMOSYNTHESIS_CAD: ICD-10-PCS | Mod: 26,,, | Performed by: RADIOLOGY

## 2019-03-18 PROCEDURE — 77067 SCR MAMMO BI INCL CAD: CPT | Mod: 26,,, | Performed by: RADIOLOGY

## 2019-03-18 PROCEDURE — 77063 BREAST TOMOSYNTHESIS BI: CPT | Mod: 26,,, | Performed by: RADIOLOGY

## 2019-03-18 PROCEDURE — 77067 MAMMO DIGITAL SCREENING BILAT WITH TOMOSYNTHESIS_CAD: ICD-10-PCS | Mod: 26,,, | Performed by: RADIOLOGY

## 2019-03-18 PROCEDURE — 77067 SCR MAMMO BI INCL CAD: CPT | Mod: TC,PO

## 2019-03-18 NOTE — PROGRESS NOTES
Your cholesterol has elevated.  Have you increased your intake of fatty food?  I recommend that you follow a low fat diet.  Exercise can also aid in cholesterol control.  I know you have plans to increase exercise this year.

## 2019-05-17 ENCOUNTER — OCCUPATIONAL HEALTH (OUTPATIENT)
Dept: URGENT CARE | Facility: CLINIC | Age: 49
End: 2019-05-17

## 2019-05-17 DIAGNOSIS — Z02.1 ENCOUNTER FOR PRE-EMPLOYMENT DRUG TESTING: Primary | ICD-10-CM

## 2019-05-17 PROCEDURE — 80305 DRUG TEST PRSMV DIR OPT OBS: CPT | Mod: S$GLB,,, | Performed by: PHYSICIAN ASSISTANT

## 2019-05-17 PROCEDURE — 80305 PR COLLECTION ONLY DRUG SCREEN: ICD-10-PCS | Mod: S$GLB,,, | Performed by: PHYSICIAN ASSISTANT

## 2019-11-18 ENCOUNTER — OFFICE VISIT (OUTPATIENT)
Dept: FAMILY MEDICINE | Facility: CLINIC | Age: 49
End: 2019-11-18
Payer: COMMERCIAL

## 2019-11-18 VITALS
HEART RATE: 66 BPM | TEMPERATURE: 99 F | OXYGEN SATURATION: 99 % | WEIGHT: 199.06 LBS | SYSTOLIC BLOOD PRESSURE: 124 MMHG | HEIGHT: 61 IN | BODY MASS INDEX: 37.58 KG/M2 | DIASTOLIC BLOOD PRESSURE: 70 MMHG

## 2019-11-18 DIAGNOSIS — Z23 NEED FOR DIPHTHERIA-TETANUS-PERTUSSIS (TDAP) VACCINE: ICD-10-CM

## 2019-11-18 DIAGNOSIS — J01.90 ACUTE BACTERIAL SINUSITIS: Primary | ICD-10-CM

## 2019-11-18 DIAGNOSIS — B96.89 ACUTE BACTERIAL SINUSITIS: Primary | ICD-10-CM

## 2019-11-18 PROCEDURE — 90471 IMMUNIZATION ADMIN: CPT | Mod: 59,S$GLB,, | Performed by: NURSE PRACTITIONER

## 2019-11-18 PROCEDURE — 90715 TDAP VACCINE GREATER THAN OR EQUAL TO 7YO IM: ICD-10-PCS | Mod: S$GLB,,, | Performed by: NURSE PRACTITIONER

## 2019-11-18 PROCEDURE — 99999 PR PBB SHADOW E&M-EST. PATIENT-LVL IV: ICD-10-PCS | Mod: PBBFAC,,, | Performed by: NURSE PRACTITIONER

## 2019-11-18 PROCEDURE — 99214 OFFICE O/P EST MOD 30 MIN: CPT | Mod: 25,S$GLB,, | Performed by: NURSE PRACTITIONER

## 2019-11-18 PROCEDURE — 99214 PR OFFICE/OUTPT VISIT, EST, LEVL IV, 30-39 MIN: ICD-10-PCS | Mod: 25,S$GLB,, | Performed by: NURSE PRACTITIONER

## 2019-11-18 PROCEDURE — 90471 TDAP VACCINE GREATER THAN OR EQUAL TO 7YO IM: ICD-10-PCS | Mod: 59,S$GLB,, | Performed by: NURSE PRACTITIONER

## 2019-11-18 PROCEDURE — 3008F PR BODY MASS INDEX (BMI) DOCUMENTED: ICD-10-PCS | Mod: CPTII,S$GLB,, | Performed by: NURSE PRACTITIONER

## 2019-11-18 PROCEDURE — 99999 PR PBB SHADOW E&M-EST. PATIENT-LVL IV: CPT | Mod: PBBFAC,,, | Performed by: NURSE PRACTITIONER

## 2019-11-18 PROCEDURE — 3008F BODY MASS INDEX DOCD: CPT | Mod: CPTII,S$GLB,, | Performed by: NURSE PRACTITIONER

## 2019-11-18 PROCEDURE — 90715 TDAP VACCINE 7 YRS/> IM: CPT | Mod: S$GLB,,, | Performed by: NURSE PRACTITIONER

## 2019-11-18 PROCEDURE — 96372 PR INJECTION,THERAP/PROPH/DIAG2ST, IM OR SUBCUT: ICD-10-PCS | Mod: S$GLB,,, | Performed by: NURSE PRACTITIONER

## 2019-11-18 PROCEDURE — 96372 THER/PROPH/DIAG INJ SC/IM: CPT | Mod: S$GLB,,, | Performed by: NURSE PRACTITIONER

## 2019-11-18 RX ORDER — CODEINE PHOSPHATE AND GUAIFENESIN 10; 100 MG/5ML; MG/5ML
5 SOLUTION ORAL 3 TIMES DAILY PRN
Qty: 118 ML | Refills: 0 | Status: SHIPPED | OUTPATIENT
Start: 2019-11-18 | End: 2019-11-28

## 2019-11-18 RX ORDER — AMOXICILLIN AND CLAVULANATE POTASSIUM 875; 125 MG/1; MG/1
1 TABLET, FILM COATED ORAL 2 TIMES DAILY
Qty: 20 TABLET | Refills: 0 | Status: SHIPPED | OUTPATIENT
Start: 2019-11-18 | End: 2019-11-28

## 2019-11-18 RX ORDER — AZELASTINE 1 MG/ML
1 SPRAY, METERED NASAL 2 TIMES DAILY
Qty: 30 ML | Refills: 0 | Status: SHIPPED | OUTPATIENT
Start: 2019-11-18 | End: 2021-06-23

## 2019-11-18 RX ORDER — TRIAMCINOLONE ACETONIDE 40 MG/ML
40 INJECTION, SUSPENSION INTRA-ARTICULAR; INTRAMUSCULAR
Status: COMPLETED | OUTPATIENT
Start: 2019-11-18 | End: 2019-11-18

## 2019-11-18 RX ADMIN — TRIAMCINOLONE ACETONIDE 40 MG: 40 INJECTION, SUSPENSION INTRA-ARTICULAR; INTRAMUSCULAR at 03:11

## 2019-11-18 NOTE — PROGRESS NOTES
Subjective:       Patient ID: Cheryl Espinosa is a 49 y.o. female.    Chief Complaint: Nasal Congestion (cough) and Sinus Problem    Sinus Problem   This is a recurrent problem. The current episode started 1 to 4 weeks ago. The problem is unchanged. There has been no fever. Her pain is at a severity of 5/10. The pain is moderate. Associated symptoms include congestion, coughing, headaches, a hoarse voice, shortness of breath, sinus pressure and sneezing. Pertinent negatives include no chills, diaphoresis, ear pain, neck pain, sore throat or swollen glands. Past treatments include oral decongestants (xicam, benadryl, dyslum, claritin, tylenol and cough drops ).   Cough   This is a chronic problem. The current episode started more than 1 month ago. The problem has been waxing and waning. The problem occurs constantly. The cough is productive of sputum. Associated symptoms include chest pain, headaches, heartburn, myalgias, nasal congestion, postnasal drip, rhinorrhea, shortness of breath, sweats and wheezing. Pertinent negatives include no chills, ear congestion, ear pain, fever, hemoptysis, rash, sore throat or weight loss. The symptoms are aggravated by exercise, fumes, lying down, stress and other. She has tried OTC cough suppressant, body position changes, cool air and rest for the symptoms. The treatment provided no relief. There is no history of asthma, bronchiectasis, bronchitis, COPD, emphysema, environmental allergies or pneumonia.     Review of Systems   Constitutional: Negative for chills, diaphoresis, fever and weight loss.   HENT: Positive for congestion, hoarse voice, postnasal drip, rhinorrhea, sinus pressure and sneezing. Negative for ear pain and sore throat.    Respiratory: Positive for cough, shortness of breath and wheezing. Negative for hemoptysis.    Cardiovascular: Positive for chest pain.   Gastrointestinal: Positive for heartburn.   Musculoskeletal: Positive for myalgias. Negative for  neck pain.   Skin: Negative for rash.   Allergic/Immunologic: Negative for environmental allergies.   Neurological: Positive for headaches.       Objective:      Physical Exam   Constitutional: She appears well-developed and well-nourished. No distress.   HENT:   Head: Normocephalic and atraumatic.   Right Ear: Tympanic membrane, external ear and ear canal normal.   Left Ear: Tympanic membrane, external ear and ear canal normal.   Nose: Mucosal edema and rhinorrhea present. Right sinus exhibits frontal sinus tenderness. Right sinus exhibits no maxillary sinus tenderness. Left sinus exhibits frontal sinus tenderness. Left sinus exhibits no maxillary sinus tenderness.   Mouth/Throat: Uvula is midline, oropharynx is clear and moist and mucous membranes are normal. Mucous membranes are not dry. No oropharyngeal exudate, posterior oropharyngeal edema or posterior oropharyngeal erythema.       Cardiovascular: Normal rate and regular rhythm.   No murmur heard.  Pulmonary/Chest: Effort normal and breath sounds normal. She has no wheezes. She has no rales.   Lymphadenopathy:     She has no cervical adenopathy.   Skin: Skin is warm and dry.   Nursing note and vitals reviewed.      Assessment:       1. Acute bacterial sinusitis        Plan:       Cheryl was seen today for nasal congestion and sinus problem.    Diagnoses and all orders for this visit:    Acute bacterial sinusitis  -     amoxicillin-clavulanate 875-125mg (AUGMENTIN) 875-125 mg per tablet; Take 1 tablet by mouth 2 (two) times daily. for 10 days  -     triamcinolone acetonide injection 40 mg  -     guaifenesin-codeine 100-10 mg/5 ml (TUSSI-ORGANIDIN NR)  mg/5 mL syrup; Take 5 mLs by mouth 3 (three) times daily as needed for Cough.  -     azelastine (ASTELIN) 137 mcg (0.1 %) nasal spray; 1 spray (137 mcg total) by Nasal route 2 (two) times daily.    HOME CARE:  · Drink plenty of water, hot tea, and other liquids to stay well hydrated. This thins the mucus  and promotes sinus drainage.  · Apply heat to the painful areas of the face. Use a towel soaked in hot water. Or,  the shower and direct the hot spray onto your face. This is a good way to inhale warm water vapor and get heat on your face at the same time. (Cover your mouth and nose with your hands so you can still breathe as you do this.)  · Use a vaporizer with products such as Vicks VapoRub (contains menthol) at night. Suck on peppermint, menthol or eucalyptus hard candies during the day.  · An expectorant containing guaifenesin (such as Robitussin), helps to thin the mucus and promote drainage from the sinuses.  · Over-the-counter decongestants may be used unless a similar medicine was prescribed. Nasal sprays work the fastest. Use one that contains phenylephrine (Carlos-synephrine, Sinex and others) or oxymetazoline (Afrin). First blow the nose gently to remove mucus, then apply the drops. Do not use these medicines more often than directed on the label or for more than three days or symptoms may worsen. You may also use tablets containing pseudoephedrine (Sudafed). Many sinus remedies combine ingredients, which may increase side effects. Read the labels or ask the pharmacist for help. NOTE: Persons with high blood pressure should not use decongestants. They can raise blood pressure.  · Antihistamines are useful if allergies are a cause of your sinusitis. The mildest one is chlorpheniramine (available without a prescription). The dose for adults is 8-12mg three times a day. [NOTE: Do not use chlorpheniramine if you have glaucoma or if you are a man with trouble urinating due to an enlarged prostate.] Claritin (loratidine) is an antihistamine that causes less drowsiness and is a good alternative for daytime use.  · Do not use nasal rinses or irrigation during an acute sinus infection, unless advised by your doctor. Rinsing may spread the infection to other sinuses.  · You may use acetaminophen (Tylenol) or  ibuprofen (Motrin, Advil) to control pain, unless another pain medicine was prescribed. [ NOTE: If you have chronic liver or kidney disease or ever had a stomach ulcer, talk with your doctor before using these medicines.] (Aspirin should never be used in anyone under 18 years of age who is ill with a fever. It may cause severe liver damage.)  · Finish the full course, even if you are feeling better after a few days.  FOLLOW UP with your doctor or this facility in one week or as instructed by our staff if not improving.  GET PROMPT MEDICAL ATTENTION if any of the following occur:  · Facial pain or headache becomes more severe  · Stiff neck  · Unusual drowsiness or confusion, or not acting like your normal self  · Swelling of the forehead or eyelids  · Vision problems including blurred or double vision  · Fever of 100.4ºF (38ºC) or higher, or as directed by your healthcare provider  · Seizure  © 5291-8584 [facility], [address] [place] [address], [place] [address]. All rights reserved. This information is not intended as a substitute for professional medical care. Always follow your healthcare professional's instructions.

## 2020-06-23 ENCOUNTER — OFFICE VISIT (OUTPATIENT)
Dept: FAMILY MEDICINE | Facility: CLINIC | Age: 50
End: 2020-06-23
Payer: COMMERCIAL

## 2020-06-23 VITALS
SYSTOLIC BLOOD PRESSURE: 100 MMHG | HEART RATE: 79 BPM | OXYGEN SATURATION: 99 % | BODY MASS INDEX: 37.45 KG/M2 | HEIGHT: 61 IN | DIASTOLIC BLOOD PRESSURE: 78 MMHG | TEMPERATURE: 97 F | WEIGHT: 198.38 LBS

## 2020-06-23 DIAGNOSIS — Z12.31 ENCOUNTER FOR SCREENING MAMMOGRAM FOR BREAST CANCER: ICD-10-CM

## 2020-06-23 DIAGNOSIS — Z00.00 ANNUAL PHYSICAL EXAM: Primary | ICD-10-CM

## 2020-06-23 DIAGNOSIS — Z11.4 ENCOUNTER FOR SCREENING FOR HIV: ICD-10-CM

## 2020-06-23 DIAGNOSIS — Z12.4 CERVICAL CANCER SCREENING: ICD-10-CM

## 2020-06-23 PROCEDURE — 88175 CYTOPATH C/V AUTO FLUID REDO: CPT

## 2020-06-23 PROCEDURE — 99396 PREV VISIT EST AGE 40-64: CPT | Mod: S$GLB,,, | Performed by: FAMILY MEDICINE

## 2020-06-23 PROCEDURE — 99396 PR PREVENTIVE VISIT,EST,40-64: ICD-10-PCS | Mod: S$GLB,,, | Performed by: FAMILY MEDICINE

## 2020-06-23 PROCEDURE — 99999 PR PBB SHADOW E&M-EST. PATIENT-LVL III: ICD-10-PCS | Mod: PBBFAC,,, | Performed by: FAMILY MEDICINE

## 2020-06-23 PROCEDURE — 99999 PR PBB SHADOW E&M-EST. PATIENT-LVL III: CPT | Mod: PBBFAC,,, | Performed by: FAMILY MEDICINE

## 2020-06-23 PROCEDURE — 87624 HPV HI-RISK TYP POOLED RSLT: CPT

## 2020-06-23 NOTE — PROGRESS NOTES
Assessment & Plan  Problem List Items Addressed This Visit     None      Visit Diagnoses     Annual physical exam    -  Primary    Relevant Orders    Comprehensive metabolic panel    CBC auto differential    Hemoglobin A1C    Lipid Panel    TSH    HIV 1/2 Ag/Ab (4th Gen)    Encounter for screening for HIV        Relevant Orders    HIV 1/2 Ag/Ab (4th Gen)      I addressed all major concerns as it related to health maintenance.  All were ordered and scheduled based on the patients wishes.  Any additional health maintenance will be readdressed at the next physical if declined or deferred by the patient.        Health Maintenance reviewed.    Follow-up: No follow-ups on file.    ______________________________________________________________________    Chief Complaint  Chief Complaint   Patient presents with    Annual Exam       HPI  Cheryl Espinosa is a 49 y.o. female with multiple medical diagnoses as listed in the medical history and problem list that presents for annual exam.  Pt is known to me with last appointment 3/15/2019.    Patient denies any new symptoms including chest pain, SOB, blurry vision, N/V, diarrhea.        PAST MEDICAL HISTORY:  Past Medical History:   Diagnosis Date    Carpal tunnel syndrome     GERD (gastroesophageal reflux disease)     History of degenerative disc disease     Metatarsalgia        PAST SURGICAL HISTORY:  History reviewed. No pertinent surgical history.    SOCIAL HISTORY:  Social History     Socioeconomic History    Marital status: Single     Spouse name: Not on file    Number of children: Not on file    Years of education: Not on file    Highest education level: Not on file   Occupational History    Not on file   Social Needs    Financial resource strain: Not very hard    Food insecurity     Worry: Never true     Inability: Never true    Transportation needs     Medical: No     Non-medical: No   Tobacco Use    Smoking status: Never Smoker    Smokeless  tobacco: Never Used   Substance and Sexual Activity    Alcohol use: Yes     Frequency: Monthly or less     Drinks per session: 1 or 2     Binge frequency: Never    Drug use: No    Sexual activity: Not on file   Lifestyle    Physical activity     Days per week: 7 days     Minutes per session: 60 min    Stress: To some extent   Relationships    Social connections     Talks on phone: More than three times a week     Gets together: Once a week     Attends Yarsanism service: Not on file     Active member of club or organization: Yes     Attends meetings of clubs or organizations: More than 4 times per year     Relationship status: Never    Other Topics Concern    Not on file   Social History Narrative    Not on file       FAMILY HISTORY:  Family History   Problem Relation Age of Onset    Breast cancer Maternal Aunt     Breast cancer Maternal Grandmother        ALLERGIES AND MEDICATIONS: updated and reviewed.  Review of patient's allergies indicates:   Allergen Reactions    Nsaids (non-steroidal anti-inflammatory drug) Nausea And Vomiting     Nausea     Medrol [methylprednisolone] Rash     Current Outpatient Medications   Medication Sig Dispense Refill    omeprazole (PRILOSEC) 40 MG capsule Take 1 capsule (40 mg total) by mouth every morning. 90 capsule 3    azelastine (ASTELIN) 137 mcg (0.1 %) nasal spray 1 spray (137 mcg total) by Nasal route 2 (two) times daily. 30 mL 0     No current facility-administered medications for this visit.          ROS  Review of Systems   Constitutional: Negative for activity change and unexpected weight change.   HENT: Negative for hearing loss, rhinorrhea and trouble swallowing.    Eyes: Negative for discharge and visual disturbance.   Respiratory: Negative for chest tightness and wheezing.    Cardiovascular: Negative for chest pain and palpitations.   Gastrointestinal: Negative for blood in stool, constipation, diarrhea and vomiting.   Endocrine: Negative for  "polydipsia and polyuria.   Genitourinary: Negative for difficulty urinating, dysuria, hematuria and menstrual problem.   Musculoskeletal: Negative for arthralgias, joint swelling and neck pain.   Neurological: Negative for weakness and headaches.   Psychiatric/Behavioral: Negative for confusion and dysphoric mood.           Physical Exam  Vitals:    06/23/20 0852   BP: 100/78   BP Location: Right arm   Patient Position: Sitting   Pulse: 79   Temp: 97 °F (36.1 °C)   SpO2: 99%   Weight: 90 kg (198 lb 6.4 oz)   Height: 5' 1" (1.549 m)    Body mass index is 37.49 kg/m².  Weight: 90 kg (198 lb 6.4 oz)   Height: 5' 1" (154.9 cm)   Physical Exam  Vitals signs reviewed.   Constitutional:       Appearance: She is well-developed.   HENT:      Head: Normocephalic and atraumatic.      Right Ear: External ear normal.      Left Ear: External ear normal.      Nose: Nose normal.   Eyes:      Conjunctiva/sclera: Conjunctivae normal.      Pupils: Pupils are equal, round, and reactive to light.   Neck:      Musculoskeletal: Normal range of motion.      Thyroid: No thyromegaly.      Vascular: No JVD.   Cardiovascular:      Rate and Rhythm: Normal rate and regular rhythm.      Heart sounds: Normal heart sounds.   Pulmonary:      Effort: Pulmonary effort is normal.      Breath sounds: Normal breath sounds. No wheezing.   Abdominal:      General: Bowel sounds are normal. There is no distension.      Palpations: Abdomen is soft.      Tenderness: There is no abdominal tenderness.   Genitourinary:     Exam position: Supine.      Labia:         Right: No rash, tenderness or lesion.         Left: No rash, tenderness, lesion or injury.       Vagina: Normal.      Cervix: No discharge or friability.      Adnexa:         Right: No mass, tenderness or fullness.          Left: No mass, tenderness or fullness.     Musculoskeletal: Normal range of motion.   Lymphadenopathy:      Cervical: No cervical adenopathy.   Skin:     General: Skin is warm and " dry.   Neurological:      Mental Status: She is alert and oriented to person, place, and time.      Deep Tendon Reflexes: Reflexes are normal and symmetric.   Psychiatric:         Behavior: Behavior normal.         Thought Content: Thought content normal.         Judgment: Judgment normal.           Health Maintenance       Date Due Completion Date    HIV Screening 11/17/1987 ---    Influenza Vaccine (Season Ended) 09/01/2020 ---    Cervical Cancer Screening 09/29/2020 9/29/2017    Mammogram 03/18/2021 3/18/2019    Lipid Panel 03/15/2024 3/15/2019    TETANUS VACCINE 11/18/2029 11/18/2019              Patient note was created using [email or web address].  Any errors in syntax or even information may not have been identified and edited on initial review prior to signing this note.

## 2020-06-24 ENCOUNTER — LAB VISIT (OUTPATIENT)
Dept: LAB | Facility: HOSPITAL | Age: 50
End: 2020-06-24
Attending: FAMILY MEDICINE
Payer: COMMERCIAL

## 2020-06-24 ENCOUNTER — HOSPITAL ENCOUNTER (OUTPATIENT)
Dept: RADIOLOGY | Facility: HOSPITAL | Age: 50
Discharge: HOME | End: 2020-06-24
Attending: FAMILY MEDICINE
Payer: COMMERCIAL

## 2020-06-24 DIAGNOSIS — Z00.00 ANNUAL PHYSICAL EXAM: ICD-10-CM

## 2020-06-24 DIAGNOSIS — Z12.31 ENCOUNTER FOR SCREENING MAMMOGRAM FOR BREAST CANCER: ICD-10-CM

## 2020-06-24 DIAGNOSIS — Z11.4 ENCOUNTER FOR SCREENING FOR HIV: ICD-10-CM

## 2020-06-24 LAB
ALBUMIN SERPL BCP-MCNC: 3.7 G/DL (ref 3.5–5.2)
ALP SERPL-CCNC: 84 U/L (ref 55–135)
ALT SERPL W/O P-5'-P-CCNC: 18 U/L (ref 10–44)
ANION GAP SERPL CALC-SCNC: 9 MMOL/L (ref 8–16)
AST SERPL-CCNC: 20 U/L (ref 10–40)
BASOPHILS # BLD AUTO: 0.03 K/UL (ref 0–0.2)
BASOPHILS NFR BLD: 0.5 % (ref 0–1.9)
BILIRUB SERPL-MCNC: 0.6 MG/DL (ref 0.1–1)
BUN SERPL-MCNC: 9 MG/DL (ref 6–20)
CALCIUM SERPL-MCNC: 9 MG/DL (ref 8.7–10.5)
CHLORIDE SERPL-SCNC: 108 MMOL/L (ref 95–110)
CHOLEST SERPL-MCNC: 226 MG/DL (ref 120–199)
CHOLEST/HDLC SERPL: 5.8 {RATIO} (ref 2–5)
CO2 SERPL-SCNC: 23 MMOL/L (ref 23–29)
CREAT SERPL-MCNC: 0.9 MG/DL (ref 0.5–1.4)
DIFFERENTIAL METHOD: ABNORMAL
EOSINOPHIL # BLD AUTO: 0.1 K/UL (ref 0–0.5)
EOSINOPHIL NFR BLD: 2 % (ref 0–8)
ERYTHROCYTE [DISTWIDTH] IN BLOOD BY AUTOMATED COUNT: 13.4 % (ref 11.5–14.5)
EST. GFR  (AFRICAN AMERICAN): >60 ML/MIN/1.73 M^2
EST. GFR  (NON AFRICAN AMERICAN): >60 ML/MIN/1.73 M^2
ESTIMATED AVG GLUCOSE: 103 MG/DL (ref 68–131)
GLUCOSE SERPL-MCNC: 83 MG/DL (ref 70–110)
HBA1C MFR BLD HPLC: 5.2 % (ref 4–5.6)
HCT VFR BLD AUTO: 38.7 % (ref 37–48.5)
HDLC SERPL-MCNC: 39 MG/DL (ref 40–75)
HDLC SERPL: 17.3 % (ref 20–50)
HGB BLD-MCNC: 11.8 G/DL (ref 12–16)
HIV 1+2 AB+HIV1 P24 AG SERPL QL IA: NEGATIVE
IMM GRANULOCYTES # BLD AUTO: 0.02 K/UL (ref 0–0.04)
IMM GRANULOCYTES NFR BLD AUTO: 0.4 % (ref 0–0.5)
LDLC SERPL CALC-MCNC: 155.2 MG/DL (ref 63–159)
LYMPHOCYTES # BLD AUTO: 1.8 K/UL (ref 1–4.8)
LYMPHOCYTES NFR BLD: 32 % (ref 18–48)
MCH RBC QN AUTO: 26.3 PG (ref 27–31)
MCHC RBC AUTO-ENTMCNC: 30.5 G/DL (ref 32–36)
MCV RBC AUTO: 86 FL (ref 82–98)
MONOCYTES # BLD AUTO: 0.4 K/UL (ref 0.3–1)
MONOCYTES NFR BLD: 6.3 % (ref 4–15)
NEUTROPHILS # BLD AUTO: 3.3 K/UL (ref 1.8–7.7)
NEUTROPHILS NFR BLD: 58.8 % (ref 38–73)
NONHDLC SERPL-MCNC: 187 MG/DL
NRBC BLD-RTO: 0 /100 WBC
PLATELET # BLD AUTO: 285 K/UL (ref 150–350)
PMV BLD AUTO: 11.1 FL (ref 9.2–12.9)
POTASSIUM SERPL-SCNC: 4.1 MMOL/L (ref 3.5–5.1)
PROT SERPL-MCNC: 6.7 G/DL (ref 6–8.4)
RBC # BLD AUTO: 4.49 M/UL (ref 4–5.4)
SODIUM SERPL-SCNC: 140 MMOL/L (ref 136–145)
TRIGL SERPL-MCNC: 159 MG/DL (ref 30–150)
TSH SERPL DL<=0.005 MIU/L-ACNC: 2.91 UIU/ML (ref 0.4–4)
WBC # BLD AUTO: 5.57 K/UL (ref 3.9–12.7)

## 2020-06-24 PROCEDURE — 85025 COMPLETE CBC W/AUTO DIFF WBC: CPT

## 2020-06-24 PROCEDURE — 36415 COLL VENOUS BLD VENIPUNCTURE: CPT | Mod: PO

## 2020-06-24 PROCEDURE — 86703 HIV-1/HIV-2 1 RESULT ANTBDY: CPT

## 2020-06-24 PROCEDURE — 77067 MAMMO DIGITAL SCREENING BILAT WITH TOMOSYNTHESIS_CAD: ICD-10-PCS | Mod: 26,,, | Performed by: RADIOLOGY

## 2020-06-24 PROCEDURE — 77063 MAMMO DIGITAL SCREENING BILAT WITH TOMOSYNTHESIS_CAD: ICD-10-PCS | Mod: 26,,, | Performed by: RADIOLOGY

## 2020-06-24 PROCEDURE — 77067 SCR MAMMO BI INCL CAD: CPT | Mod: TC,PO

## 2020-06-24 PROCEDURE — 83036 HEMOGLOBIN GLYCOSYLATED A1C: CPT

## 2020-06-24 PROCEDURE — 80061 LIPID PANEL: CPT

## 2020-06-24 PROCEDURE — 80053 COMPREHEN METABOLIC PANEL: CPT

## 2020-06-24 PROCEDURE — 84443 ASSAY THYROID STIM HORMONE: CPT

## 2020-06-24 PROCEDURE — 77063 BREAST TOMOSYNTHESIS BI: CPT | Mod: 26,,, | Performed by: RADIOLOGY

## 2020-06-24 PROCEDURE — 77067 SCR MAMMO BI INCL CAD: CPT | Mod: 26,,, | Performed by: RADIOLOGY

## 2020-06-29 LAB
FINAL PATHOLOGIC DIAGNOSIS: NORMAL
Lab: NORMAL

## 2020-07-07 LAB
HPV HR 12 DNA SPEC QL NAA+PROBE: NEGATIVE
HPV16 AG SPEC QL: NEGATIVE
HPV18 DNA SPEC QL NAA+PROBE: NEGATIVE

## 2020-07-27 ENCOUNTER — TELEPHONE (OUTPATIENT)
Dept: ORTHOPEDICS | Facility: CLINIC | Age: 50
End: 2020-07-27

## 2020-07-27 NOTE — TELEPHONE ENCOUNTER
----- Message from Marcio Montez sent at 7/27/2020  9:14 AM CDT -----  Name of Who is Calling: GUILLAUME SOLOMON [[id number]]    What is the request in detail:Patient is requesting a call back  in regards to becoming a new Patient  Please contact to further discuss and advise      Can the clinic reply by [facility]: no     What Number to [facility] Back [facility]:  [phone]

## 2020-08-14 ENCOUNTER — TELEPHONE (OUTPATIENT)
Dept: ORTHOPEDICS | Facility: CLINIC | Age: 50
End: 2020-08-14

## 2020-08-14 DIAGNOSIS — M50.30 DDD (DEGENERATIVE DISC DISEASE), CERVICAL: Primary | ICD-10-CM

## 2020-08-14 DIAGNOSIS — Z11.59 NEED FOR HEPATITIS C SCREENING TEST: ICD-10-CM

## 2020-09-01 ENCOUNTER — HOSPITAL ENCOUNTER (OUTPATIENT)
Dept: RADIOLOGY | Facility: HOSPITAL | Age: 50
Discharge: HOME | End: 2020-09-01
Attending: PHYSICIAN ASSISTANT
Payer: COMMERCIAL

## 2020-09-01 ENCOUNTER — PATIENT OUTREACH (OUTPATIENT)
Dept: ADMINISTRATIVE | Facility: OTHER | Age: 50
End: 2020-09-01

## 2020-09-01 ENCOUNTER — OFFICE VISIT (OUTPATIENT)
Dept: ORTHOPEDICS | Facility: CLINIC | Age: 50
End: 2020-09-01
Payer: COMMERCIAL

## 2020-09-01 VITALS — HEIGHT: 61 IN | WEIGHT: 193.56 LBS | BODY MASS INDEX: 36.55 KG/M2

## 2020-09-01 DIAGNOSIS — M50.30 DDD (DEGENERATIVE DISC DISEASE), CERVICAL: ICD-10-CM

## 2020-09-01 DIAGNOSIS — M50.30 DDD (DEGENERATIVE DISC DISEASE), CERVICAL: Primary | ICD-10-CM

## 2020-09-01 PROCEDURE — 99999 PR PBB SHADOW E&M-EST. PATIENT-LVL III: ICD-10-PCS | Mod: PBBFAC,,, | Performed by: PHYSICIAN ASSISTANT

## 2020-09-01 PROCEDURE — 99999 PR PBB SHADOW E&M-EST. PATIENT-LVL III: CPT | Mod: PBBFAC,,, | Performed by: PHYSICIAN ASSISTANT

## 2020-09-01 PROCEDURE — 72050 XR CERVICAL SPINE AP LAT WITH FLEX EXTEN: ICD-10-PCS | Mod: 26,,, | Performed by: RADIOLOGY

## 2020-09-01 PROCEDURE — 3008F PR BODY MASS INDEX (BMI) DOCUMENTED: ICD-10-PCS | Mod: CPTII,S$GLB,, | Performed by: PHYSICIAN ASSISTANT

## 2020-09-01 PROCEDURE — 3008F BODY MASS INDEX DOCD: CPT | Mod: CPTII,S$GLB,, | Performed by: PHYSICIAN ASSISTANT

## 2020-09-01 PROCEDURE — 99204 OFFICE O/P NEW MOD 45 MIN: CPT | Mod: S$GLB,,, | Performed by: PHYSICIAN ASSISTANT

## 2020-09-01 PROCEDURE — 72050 X-RAY EXAM NECK SPINE 4/5VWS: CPT | Mod: 26,,, | Performed by: RADIOLOGY

## 2020-09-01 PROCEDURE — 99204 PR OFFICE/OUTPT VISIT, NEW, LEVL IV, 45-59 MIN: ICD-10-PCS | Mod: S$GLB,,, | Performed by: PHYSICIAN ASSISTANT

## 2020-09-01 PROCEDURE — 72050 X-RAY EXAM NECK SPINE 4/5VWS: CPT | Mod: TC

## 2020-09-01 RX ORDER — DIAZEPAM 5 MG/1
TABLET ORAL
COMMUNITY
End: 2021-06-23 | Stop reason: SDUPTHER

## 2020-09-01 NOTE — PROGRESS NOTES
DATE: 9/1/2020  PATIENT: Cheryl Espinosa    Supervising Physician: Roderick Akins M.D.    CHIEF COMPLAINT: neck pain    HISTORY:  Cheryl Espinosa is a 49 y.o. female with PMH of chronic low back pain, chronic neck pain and left shoulder pain here for initial evaluation of neck and left shoulder pain (Neck - 5, Arm - 0). The pain has been present for several years.  She says this has been present since at least 2009.  The patient describes the pain as aching. The pain is worse with stress and sitting at a desk and improved by exercise. There is occasionally associated numbness and tingling. There is intermittent subjective weakness.  She says she sometimes has difficulty opening bottles.  Prior treatments have included massage, valium, a , a chiropractor, and heat, but no ESIs or surgery.  She reports several reactions to different medications so she prefers not to take anything.   She does report some low back pain as well.     The patient denies myelopathic symptoms such as handwriting changes or difficulty with buttons/coins/keys. Denies perineal paresthesias, bowel/bladder dysfunction.    PAST MEDICAL/SURGICAL HISTORY:  Past Medical History:   Diagnosis Date    Carpal tunnel syndrome     GERD (gastroesophageal reflux disease)     History of degenerative disc disease     Metatarsalgia      History reviewed. No pertinent surgical history.    Medications:  Current Outpatient Medications on File Prior to Visit   Medication Sig Dispense Refill    diazePAM (VALIUM) 5 MG tablet       omeprazole (PRILOSEC) 40 MG capsule Take 1 capsule (40 mg total) by mouth every morning. 90 capsule 3    azelastine (ASTELIN) 137 mcg (0.1 %) nasal spray 1 spray (137 mcg total) by Nasal route 2 (two) times daily. 30 mL 0     No current facility-administered medications on file prior to visit.        Social History:   Social History     Socioeconomic History    Marital status: Single     Spouse  name: Not on file    Number of children: Not on file    Years of education: Not on file    Highest education level: Not on file   Occupational History    Not on file   Social Needs    Financial resource strain: Not very hard    Food insecurity     Worry: Never true     Inability: Never true    Transportation needs     Medical: No     Non-medical: No   Tobacco Use    Smoking status: Never Smoker    Smokeless tobacco: Never Used   Substance and Sexual Activity    Alcohol use: Yes     Frequency: Monthly or less     Drinks per session: 1 or 2     Binge frequency: Never    Drug use: No    Sexual activity: Not on file   Lifestyle    Physical activity     Days per week: 7 days     Minutes per session: 60 min    Stress: To some extent   Relationships    Social connections     Talks on phone: More than three times a week     Gets together: Once a week     Attends Gnosticism service: Not on file     Active member of club or organization: Yes     Attends meetings of clubs or organizations: More than 4 times per year     Relationship status: Never    Other Topics Concern    Not on file   Social History Narrative    Not on file       REVIEW OF SYSTEMS:  Constitution: Negative. Negative for chills, fever and night sweats.   Cardiovascular: Negative for chest pain and syncope.   Respiratory: Negative for cough and shortness of breath.   Gastrointestinal: See HPI. Negative for nausea/vomiting. Negative for abdominal pain.  Genitourinary: See HPI. Negative for discoloration or dysuria.  Skin: Negative for dry skin, itching and rash.   Hematologic/Lymphatic: Negative for bleeding problem. Does not bruise/bleed easily.   Musculoskeletal: Negative for falls and muscle weakness.   Neurological: See HPI. No seizures.   Endocrine: Negative for polydipsia, polyphagia and polyuria.   Allergic/Immunologic: Negative for hives and persistent infections.  Psychiatric/Behavioral: Negative for depression and insomnia.      "    EXAM:  Ht 5' 1" (1.549 m)   Wt 87.8 kg (193 lb 9 oz)   LMP 06/09/2019   BMI 36.57 kg/m²     General: The patient is a pleasant 49 y.o. female in no apparent distress, the patient is oriented to person, place and time.  Psych: Normal mood and affect  HEENT: Vision grossly intact, hearing intact to the spoken word.  Lungs: Respirations unlabored.  Gait: Normal station and gait, no difficulty with toe or heel walk.   Skin: Cervical skin negative for rashes, lesions, hairy patches and surgical scars.  Range of motion: Cervical range of motion is acceptable. There is mild tenderness to palpation.  Spinal Balance: Global saggital and coronal spinal balance acceptable, no significant for scoliosis and kyphosis.  Musculoskeletal: No pain with the range of motion of the bilateral shoulders and elbows. Normal bulk and contour of the bilateral hands.  Vascular: Bilateral hands warm and well perfused, radial pulses 2+ bilaterally.  Neurological: Normal strength and tone in all major motor groups in the bilateral upper and lower extremities. Normal sensation to light touch in the C5-T1 and L2-S1 dermatomes bilaterally.  Deep tendon reflexes symmetric 2+ in the bilateral upper and lower extremities.  Negative Inverted Radial Reflex and Murillo's bilaterally. Negative Babinski bilaterally.     IMAGING:   Today I personally reviewed AP, Lat and Flex/Ex  upright C-spine films that demonstrate mild C5/6 disc space narrowing.     Body mass index is 36.57 kg/m².    Hemoglobin A1C   Date Value Ref Range Status   06/24/2020 5.2 4.0 - 5.6 % Final     Comment:     ADA Screening Guidelines:  5.7-6.4%  Consistent with prediabetes  >or=6.5%  Consistent with diabetes  High levels of fetal hemoglobin interfere with the HbA1C  assay. Heterozygous hemoglobin variants (HbS, HgC, etc)do  not significantly interfere with this assay.   However, presence of multiple variants may affect accuracy.     03/15/2019 5.1 4.0 - 5.6 % Final     Comment: "     ADA Screening Guidelines:  5.7-6.4%  Consistent with prediabetes  >or=6.5%  Consistent with diabetes  High levels of fetal hemoglobin interfere with the HbA1C  assay. Heterozygous hemoglobin variants (HbS, HgC, etc)do  not significantly interfere with this assay.   However, presence of multiple variants may affect accuracy.             ASSESSMENT/PLAN:    Cheryl was seen today for back pain, knee pain and leg pain.    Diagnoses and all orders for this visit:    DDD (degenerative disc disease), cervical        Today we discussed at length all of the different treatment options including anti-inflammatories, acetaminophen, rest, ice, heat, physical therapy including strengthening and stretching exercises, home exercises, ROM, aerobic conditioning, aqua therapy, other modalities including ultrasound, massage, and dry needling, epidural steroid injections and finally surgical intervention.      The patient would like to continue working with her  as this is giving her some relief.  She does not want to take medications.  Follow up as needed.

## 2020-10-23 ENCOUNTER — PATIENT OUTREACH (OUTPATIENT)
Dept: ADMINISTRATIVE | Facility: OTHER | Age: 50
End: 2020-10-23

## 2020-10-24 NOTE — PROGRESS NOTES
Health Maintenance Due   Topic Date Due    Hepatitis C Screening  1970    Influenza Vaccine (1) 08/01/2020       Chart was reviewed for overdue Proactive [facility] Encounters ([facility]) topics (CRS, Breast Cancer Screening, Eye exam)  Health Maintenance has been updated.  LINKS immunization registry triggered.  Immunizations were reconciled.

## 2020-10-26 ENCOUNTER — TELEPHONE (OUTPATIENT)
Dept: PHARMACY | Facility: CLINIC | Age: 50
End: 2020-10-26

## 2020-10-26 ENCOUNTER — OFFICE VISIT (OUTPATIENT)
Dept: GASTROENTEROLOGY | Facility: CLINIC | Age: 50
End: 2020-10-26
Payer: COMMERCIAL

## 2020-10-26 VITALS
HEIGHT: 61 IN | WEIGHT: 194 LBS | HEART RATE: 88 BPM | DIASTOLIC BLOOD PRESSURE: 76 MMHG | SYSTOLIC BLOOD PRESSURE: 104 MMHG | BODY MASS INDEX: 36.63 KG/M2

## 2020-10-26 DIAGNOSIS — K21.9 GASTROESOPHAGEAL REFLUX DISEASE, UNSPECIFIED WHETHER ESOPHAGITIS PRESENT: Primary | ICD-10-CM

## 2020-10-26 PROCEDURE — 99999 PR PBB SHADOW E&M-EST. PATIENT-LVL III: CPT | Mod: PBBFAC,,, | Performed by: INTERNAL MEDICINE

## 2020-10-26 PROCEDURE — 99203 OFFICE O/P NEW LOW 30 MIN: CPT | Mod: S$GLB,,, | Performed by: INTERNAL MEDICINE

## 2020-10-26 PROCEDURE — 3008F BODY MASS INDEX DOCD: CPT | Mod: CPTII,S$GLB,, | Performed by: INTERNAL MEDICINE

## 2020-10-26 PROCEDURE — 99203 PR OFFICE/OUTPT VISIT, NEW, LEVL III, 30-44 MIN: ICD-10-PCS | Mod: S$GLB,,, | Performed by: INTERNAL MEDICINE

## 2020-10-26 PROCEDURE — 3008F PR BODY MASS INDEX (BMI) DOCUMENTED: ICD-10-PCS | Mod: CPTII,S$GLB,, | Performed by: INTERNAL MEDICINE

## 2020-10-26 PROCEDURE — 99999 PR PBB SHADOW E&M-EST. PATIENT-LVL III: ICD-10-PCS | Mod: PBBFAC,,, | Performed by: INTERNAL MEDICINE

## 2020-10-26 RX ORDER — FAMOTIDINE 40 MG/1
40 TABLET, FILM COATED ORAL 2 TIMES DAILY
Qty: 60 TABLET | Refills: 4 | Status: SHIPPED | OUTPATIENT
Start: 2020-10-26 | End: 2021-09-24

## 2020-10-26 NOTE — PATIENT INSTRUCTIONS
Tips to Control Acid Reflux    To control acid reflux, youll need to make some basic diet and lifestyle changes. The simple steps outlined below may be all youll need to ease discomfort.  Watch what you eat  · Avoid fatty foods and spicy foods.  · Eat fewer acidic foods, such as citrus and tomato-based foods. These can increase symptoms.  · Limit drinking alcohol, caffeine, and fizzy beverages. All increase acid reflux.  · Try limiting chocolate, peppermint, and spearmint. These can worsen acid reflux in some people.  Watch when you eat  · Avoid lying down for 3 hours after eating.  · Do not snack before going to bed.  Raise your head  Raising your head and upper body by 4 to 6 inches helps limit reflux when youre lying down. Put blocks under the head of your bed frame to raise it.  Other changes  · Lose weight, if you need to  · Dont exercise near bedtime  · Avoid tight-fitting clothes  · Limit aspirin and ibuprofen  · Stop smoking   Date Last Reviewed: 7/1/2016  © 6490-1129 [employer]. [address] [place] [address], [place] [address]. All rights reserved. This information is not intended as a substitute for professional medical care. Always follow your healthcare professional's instructions.

## 2020-10-26 NOTE — PROGRESS NOTES
Subjective:       Patient ID: Cheryl Espinosa is a 49 y.o. female.    Chief Complaint: Gastroesophageal Reflux (Pt has a PMH of GERD pt states that she's been taking prilosec to help with the GERD. [facility]. Pt denies any new compalints. ) and Medication Refill      HPI:   Gastroesophageal Reflux  She complains of heartburn. She reports no chest pain, no sore throat or no wheezing. This is a chronic problem. The problem occurs frequently. The problem has been unchanged. The heartburn duration is several minutes. The heartburn is located in the substernum. The heartburn changes with position. She has tried a PPI for the symptoms. The treatment provided significant relief. Past procedures include an EGD.   Medication Refill  This is a chronic problem. The current episode started more than 1 year ago. Pertinent negatives include no arthralgias, chest pain, fever, joint swelling, neck pain, rash, sore throat or weakness. The treatment provided significant relief.   Patient has been on PPI for over 5 years. She denies melena, dysphagia or weight loss.      Review of Systems   Constitutional: Negative for appetite change and fever.   HENT: Negative for sore throat and trouble swallowing.    Eyes: Negative for photophobia and visual disturbance.   Respiratory: Negative for wheezing.    Cardiovascular: Negative for chest pain and palpitations.   Gastrointestinal: Positive for heartburn.        See HPI for details   Musculoskeletal: Negative for arthralgias, joint swelling and neck pain.   Integumentary:  Negative for rash and wound.   Neurological: Negative for dizziness, tremors and weakness.   Psychiatric/Behavioral: Negative for behavioral problems and suicidal ideas.         Objective:       Vitals:    10/26/20 0820   BP: 104/76   Pulse: 88       Physical Exam  Eyes:      Pupils: Pupils are equal, round, and reactive to light.   Neck:      Musculoskeletal: Neck supple.    Cardiovascular:      Heart sounds: Normal heart sounds.   Pulmonary:      Effort: Pulmonary effort is normal.      Breath sounds: Normal breath sounds.   Abdominal:      Palpations: Abdomen is soft. There is no mass.      Tenderness: There is no abdominal tenderness. There is no guarding.   Musculoskeletal: Normal range of motion.   Lymphadenopathy:      Cervical: No cervical adenopathy.   Skin:     General: Skin is warm.      Findings: No rash.   Neurological:      Mental Status: She is alert and oriented to person, place, and time.         Assessment:       1. Gastroesophageal reflux disease, unspecified whether esophagitis present        Plan:     Cheryl was seen today for gastroesophageal reflux and medication refill.    Diagnoses and all orders for this visit:    Gastroesophageal reflux disease, unspecified whether esophagitis present    Other orders  -     famotidine (PEPCID) 40 MG tablet; Take 1 tablet (40 mg total) by mouth 2 (two) times daily.        Follow an antireflux regimen.  This includes:       - Do not lie down for at least 3 to 4 hours after meals.        - Raise the head of the bed 4 to 6 inches.        - Decrease excess weight.        - Avoid citrus juices and other acidic foods, alcohol, chocolate, mints, coffee and other        caffeinated beverages, carbonated beverages, fatty and fried foods.        - Avoid tight-fitting clothing.        - Avoid cigarettes and other tobacco products.     We talked about risks of long term use of PPI. She will like to consider H2B.

## 2020-10-26 NOTE — TELEPHONE ENCOUNTER
"The prior authorization request for Cheryl Espinosa's Famotidine has been DENIED.     The insurance responded with the following: "The requested medication and/or diagnosis are not a covered benefit and is excluded from coverage in accordance with the terms and conditions of the plan benefit. Therefore, this request has been administratively denied."    If you would like to do a [id number] review to discuss the decision, please call [phone] and reference PA # [id number].    An appeal can be done by sending written letter of medical necessity along with a  Copy of the denial letter. If you would like me to fax over the papers, please let me know.    Thanks  [place],PharmD  [facility]  [phone]  "

## 2020-11-24 ENCOUNTER — PATIENT MESSAGE (OUTPATIENT)
Dept: FAMILY MEDICINE | Facility: CLINIC | Age: 50
End: 2020-11-24

## 2020-11-24 DIAGNOSIS — Z20.822 EXPOSURE TO COVID-19 VIRUS: Primary | ICD-10-CM

## 2020-12-07 ENCOUNTER — PATIENT MESSAGE (OUTPATIENT)
Dept: ADMINISTRATIVE | Facility: OTHER | Age: 50
End: 2020-12-07

## 2021-05-27 ENCOUNTER — PATIENT MESSAGE (OUTPATIENT)
Dept: FAMILY MEDICINE | Facility: CLINIC | Age: 51
End: 2021-05-27

## 2021-06-23 ENCOUNTER — LAB VISIT (OUTPATIENT)
Dept: LAB | Facility: HOSPITAL | Age: 51
End: 2021-06-23
Attending: FAMILY MEDICINE
Payer: COMMERCIAL

## 2021-06-23 ENCOUNTER — OFFICE VISIT (OUTPATIENT)
Dept: FAMILY MEDICINE | Facility: CLINIC | Age: 51
End: 2021-06-23
Payer: COMMERCIAL

## 2021-06-23 VITALS
OXYGEN SATURATION: 98 % | BODY MASS INDEX: 35.5 KG/M2 | DIASTOLIC BLOOD PRESSURE: 80 MMHG | TEMPERATURE: 98 F | HEIGHT: 61 IN | HEART RATE: 80 BPM | WEIGHT: 188.06 LBS | SYSTOLIC BLOOD PRESSURE: 110 MMHG

## 2021-06-23 DIAGNOSIS — G89.29 CHRONIC BILATERAL LOW BACK PAIN WITH RIGHT-SIDED SCIATICA: ICD-10-CM

## 2021-06-23 DIAGNOSIS — M54.41 CHRONIC BILATERAL LOW BACK PAIN WITH RIGHT-SIDED SCIATICA: ICD-10-CM

## 2021-06-23 DIAGNOSIS — Z00.00 ANNUAL PHYSICAL EXAM: Primary | ICD-10-CM

## 2021-06-23 DIAGNOSIS — F41.9 ANXIETY: ICD-10-CM

## 2021-06-23 DIAGNOSIS — Z12.31 ENCOUNTER FOR SCREENING MAMMOGRAM FOR BREAST CANCER: ICD-10-CM

## 2021-06-23 DIAGNOSIS — Z00.00 ANNUAL PHYSICAL EXAM: ICD-10-CM

## 2021-06-23 LAB
ALBUMIN SERPL BCP-MCNC: 3.6 G/DL (ref 3.5–5.2)
ALP SERPL-CCNC: 88 U/L (ref 55–135)
ALT SERPL W/O P-5'-P-CCNC: 11 U/L (ref 10–44)
ANION GAP SERPL CALC-SCNC: 9 MMOL/L (ref 8–16)
AST SERPL-CCNC: 12 U/L (ref 10–40)
BASOPHILS # BLD AUTO: 0.04 K/UL (ref 0–0.2)
BASOPHILS NFR BLD: 0.6 % (ref 0–1.9)
BILIRUB SERPL-MCNC: 0.3 MG/DL (ref 0.1–1)
BUN SERPL-MCNC: 9 MG/DL (ref 6–20)
CALCIUM SERPL-MCNC: 8.9 MG/DL (ref 8.7–10.5)
CHLORIDE SERPL-SCNC: 108 MMOL/L (ref 95–110)
CHOLEST SERPL-MCNC: 198 MG/DL (ref 120–199)
CHOLEST/HDLC SERPL: 5.4 {RATIO} (ref 2–5)
CO2 SERPL-SCNC: 24 MMOL/L (ref 23–29)
CREAT SERPL-MCNC: 0.8 MG/DL (ref 0.5–1.4)
DIFFERENTIAL METHOD: ABNORMAL
EOSINOPHIL # BLD AUTO: 0.2 K/UL (ref 0–0.5)
EOSINOPHIL NFR BLD: 2.4 % (ref 0–8)
ERYTHROCYTE [DISTWIDTH] IN BLOOD BY AUTOMATED COUNT: 13.4 % (ref 11.5–14.5)
EST. GFR  (AFRICAN AMERICAN): >60 ML/MIN/1.73 M^2
EST. GFR  (NON AFRICAN AMERICAN): >60 ML/MIN/1.73 M^2
ESTIMATED AVG GLUCOSE: 94 MG/DL (ref 68–131)
GLUCOSE SERPL-MCNC: 92 MG/DL (ref 70–110)
HBA1C MFR BLD: 4.9 % (ref 4–5.6)
HCT VFR BLD AUTO: 38.6 % (ref 37–48.5)
HDLC SERPL-MCNC: 37 MG/DL (ref 40–75)
HDLC SERPL: 18.7 % (ref 20–50)
HGB BLD-MCNC: 11.9 G/DL (ref 12–16)
IMM GRANULOCYTES # BLD AUTO: 0.02 K/UL (ref 0–0.04)
IMM GRANULOCYTES NFR BLD AUTO: 0.3 % (ref 0–0.5)
LDLC SERPL CALC-MCNC: 117.2 MG/DL (ref 63–159)
LYMPHOCYTES # BLD AUTO: 1.7 K/UL (ref 1–4.8)
LYMPHOCYTES NFR BLD: 27.1 % (ref 18–48)
MCH RBC QN AUTO: 26.6 PG (ref 27–31)
MCHC RBC AUTO-ENTMCNC: 30.8 G/DL (ref 32–36)
MCV RBC AUTO: 86 FL (ref 82–98)
MONOCYTES # BLD AUTO: 0.3 K/UL (ref 0.3–1)
MONOCYTES NFR BLD: 5.2 % (ref 4–15)
NEUTROPHILS # BLD AUTO: 4.1 K/UL (ref 1.8–7.7)
NEUTROPHILS NFR BLD: 64.4 % (ref 38–73)
NONHDLC SERPL-MCNC: 161 MG/DL
NRBC BLD-RTO: 0 /100 WBC
PLATELET # BLD AUTO: 312 K/UL (ref 150–450)
PMV BLD AUTO: 10.6 FL (ref 9.2–12.9)
POTASSIUM SERPL-SCNC: 4.1 MMOL/L (ref 3.5–5.1)
PROT SERPL-MCNC: 6.7 G/DL (ref 6–8.4)
RBC # BLD AUTO: 4.47 M/UL (ref 4–5.4)
SODIUM SERPL-SCNC: 141 MMOL/L (ref 136–145)
TRIGL SERPL-MCNC: 219 MG/DL (ref 30–150)
TSH SERPL DL<=0.005 MIU/L-ACNC: 2.11 UIU/ML (ref 0.4–4)
WBC # BLD AUTO: 6.34 K/UL (ref 3.9–12.7)

## 2021-06-23 PROCEDURE — 36415 COLL VENOUS BLD VENIPUNCTURE: CPT | Mod: PO | Performed by: FAMILY MEDICINE

## 2021-06-23 PROCEDURE — 80053 COMPREHEN METABOLIC PANEL: CPT | Performed by: FAMILY MEDICINE

## 2021-06-23 PROCEDURE — 99999 PR PBB SHADOW E&M-EST. PATIENT-LVL IV: ICD-10-PCS | Mod: PBBFAC,,, | Performed by: FAMILY MEDICINE

## 2021-06-23 PROCEDURE — 84443 ASSAY THYROID STIM HORMONE: CPT | Performed by: FAMILY MEDICINE

## 2021-06-23 PROCEDURE — 1125F AMNT PAIN NOTED PAIN PRSNT: CPT | Mod: S$GLB,,, | Performed by: FAMILY MEDICINE

## 2021-06-23 PROCEDURE — 99999 PR PBB SHADOW E&M-EST. PATIENT-LVL IV: CPT | Mod: PBBFAC,,, | Performed by: FAMILY MEDICINE

## 2021-06-23 PROCEDURE — 1125F PR PAIN SEVERITY QUANTIFIED, PAIN PRESENT: ICD-10-PCS | Mod: S$GLB,,, | Performed by: FAMILY MEDICINE

## 2021-06-23 PROCEDURE — 3008F PR BODY MASS INDEX (BMI) DOCUMENTED: ICD-10-PCS | Mod: CPTII,S$GLB,, | Performed by: FAMILY MEDICINE

## 2021-06-23 PROCEDURE — 99396 PREV VISIT EST AGE 40-64: CPT | Mod: S$GLB,,, | Performed by: FAMILY MEDICINE

## 2021-06-23 PROCEDURE — 80061 LIPID PANEL: CPT | Performed by: FAMILY MEDICINE

## 2021-06-23 PROCEDURE — 85025 COMPLETE CBC W/AUTO DIFF WBC: CPT | Performed by: FAMILY MEDICINE

## 2021-06-23 PROCEDURE — 83036 HEMOGLOBIN GLYCOSYLATED A1C: CPT | Performed by: FAMILY MEDICINE

## 2021-06-23 PROCEDURE — 99396 PR PREVENTIVE VISIT,EST,40-64: ICD-10-PCS | Mod: S$GLB,,, | Performed by: FAMILY MEDICINE

## 2021-06-23 PROCEDURE — 3008F BODY MASS INDEX DOCD: CPT | Mod: CPTII,S$GLB,, | Performed by: FAMILY MEDICINE

## 2021-06-23 RX ORDER — DIAZEPAM 5 MG/1
5 TABLET ORAL EVERY 12 HOURS PRN
Qty: 60 TABLET | Refills: 1 | Status: SHIPPED | OUTPATIENT
Start: 2021-06-23 | End: 2022-09-21

## 2021-06-23 RX ORDER — DIAZEPAM 5 MG/1
5 TABLET ORAL EVERY 12 HOURS PRN
Qty: 60 TABLET | Refills: 0 | Status: SHIPPED | OUTPATIENT
Start: 2021-06-23 | End: 2021-06-23

## 2021-06-25 ENCOUNTER — HOSPITAL ENCOUNTER (OUTPATIENT)
Dept: RADIOLOGY | Facility: HOSPITAL | Age: 51
Discharge: HOME | End: 2021-06-25
Attending: FAMILY MEDICINE
Payer: COMMERCIAL

## 2021-06-25 DIAGNOSIS — Z12.31 ENCOUNTER FOR SCREENING MAMMOGRAM FOR BREAST CANCER: ICD-10-CM

## 2021-06-25 PROCEDURE — 77063 BREAST TOMOSYNTHESIS BI: CPT | Mod: 26,,, | Performed by: RADIOLOGY

## 2021-06-25 PROCEDURE — 77067 SCR MAMMO BI INCL CAD: CPT | Mod: TC,PO

## 2021-06-25 PROCEDURE — 77063 MAMMO DIGITAL SCREENING BILAT WITH TOMO: ICD-10-PCS | Mod: 26,,, | Performed by: RADIOLOGY

## 2021-06-25 PROCEDURE — 77067 SCR MAMMO BI INCL CAD: CPT | Mod: 26,,, | Performed by: RADIOLOGY

## 2021-06-25 PROCEDURE — 77067 MAMMO DIGITAL SCREENING BILAT WITH TOMO: ICD-10-PCS | Mod: 26,,, | Performed by: RADIOLOGY

## 2021-07-14 ENCOUNTER — PATIENT MESSAGE (OUTPATIENT)
Dept: FAMILY MEDICINE | Facility: CLINIC | Age: 51
End: 2021-07-14

## 2021-07-15 ENCOUNTER — PATIENT MESSAGE (OUTPATIENT)
Dept: FAMILY MEDICINE | Facility: CLINIC | Age: 51
End: 2021-07-15

## 2021-07-15 DIAGNOSIS — M79.606 PAIN OF LOWER EXTREMITY, UNSPECIFIED LATERALITY: Primary | ICD-10-CM

## 2021-07-19 ENCOUNTER — PATIENT MESSAGE (OUTPATIENT)
Dept: FAMILY MEDICINE | Facility: CLINIC | Age: 51
End: 2021-07-19

## 2021-07-20 ENCOUNTER — PATIENT MESSAGE (OUTPATIENT)
Dept: SPINE | Facility: CLINIC | Age: 51
End: 2021-07-20

## 2021-07-20 ENCOUNTER — TELEPHONE (OUTPATIENT)
Dept: FAMILY MEDICINE | Facility: CLINIC | Age: 51
End: 2021-07-20
Payer: COMMERCIAL

## 2021-07-21 ENCOUNTER — TELEPHONE (OUTPATIENT)
Dept: NEUROLOGY | Facility: CLINIC | Age: 51
End: 2021-07-21

## 2021-07-23 ENCOUNTER — TELEPHONE (OUTPATIENT)
Dept: PHARMACY | Facility: CLINIC | Age: 51
End: 2021-07-23

## 2021-07-28 ENCOUNTER — TELEPHONE (OUTPATIENT)
Dept: PHARMACY | Facility: CLINIC | Age: 51
End: 2021-07-28

## 2021-07-29 ENCOUNTER — OFFICE VISIT (OUTPATIENT)
Dept: SPINE | Facility: CLINIC | Age: 51
End: 2021-07-29
Attending: PHYSICAL MEDICINE & REHABILITATION
Payer: COMMERCIAL

## 2021-07-29 VITALS
HEIGHT: 61 IN | DIASTOLIC BLOOD PRESSURE: 70 MMHG | HEART RATE: 67 BPM | SYSTOLIC BLOOD PRESSURE: 115 MMHG | WEIGHT: 188.06 LBS | BODY MASS INDEX: 35.5 KG/M2

## 2021-07-29 DIAGNOSIS — M51.37 DDD (DEGENERATIVE DISC DISEASE), LUMBOSACRAL: ICD-10-CM

## 2021-07-29 DIAGNOSIS — M54.41 CHRONIC BILATERAL LOW BACK PAIN WITH RIGHT-SIDED SCIATICA: Primary | ICD-10-CM

## 2021-07-29 DIAGNOSIS — G89.29 CHRONIC BILATERAL LOW BACK PAIN WITH RIGHT-SIDED SCIATICA: Primary | ICD-10-CM

## 2021-07-29 PROCEDURE — 1159F MED LIST DOCD IN RCRD: CPT | Mod: CPTII,S$GLB,, | Performed by: PHYSICAL MEDICINE & REHABILITATION

## 2021-07-29 PROCEDURE — 1160F RVW MEDS BY RX/DR IN RCRD: CPT | Mod: CPTII,S$GLB,, | Performed by: PHYSICAL MEDICINE & REHABILITATION

## 2021-07-29 PROCEDURE — 1160F PR REVIEW ALL MEDS BY PRESCRIBER/CLIN PHARMACIST DOCUMENTED: ICD-10-PCS | Mod: CPTII,S$GLB,, | Performed by: PHYSICAL MEDICINE & REHABILITATION

## 2021-07-29 PROCEDURE — 99999 PR PBB SHADOW E&M-EST. PATIENT-LVL III: ICD-10-PCS | Mod: PBBFAC,,, | Performed by: PHYSICAL MEDICINE & REHABILITATION

## 2021-07-29 PROCEDURE — 1125F PR PAIN SEVERITY QUANTIFIED, PAIN PRESENT: ICD-10-PCS | Mod: CPTII,S$GLB,, | Performed by: PHYSICAL MEDICINE & REHABILITATION

## 2021-07-29 PROCEDURE — 3008F BODY MASS INDEX DOCD: CPT | Mod: CPTII,S$GLB,, | Performed by: PHYSICAL MEDICINE & REHABILITATION

## 2021-07-29 PROCEDURE — 99204 PR OFFICE/OUTPT VISIT, NEW, LEVL IV, 45-59 MIN: ICD-10-PCS | Mod: S$GLB,,, | Performed by: PHYSICAL MEDICINE & REHABILITATION

## 2021-07-29 PROCEDURE — 3044F HG A1C LEVEL LT 7.0%: CPT | Mod: CPTII,S$GLB,, | Performed by: PHYSICAL MEDICINE & REHABILITATION

## 2021-07-29 PROCEDURE — 3044F PR MOST RECENT HEMOGLOBIN A1C LEVEL <7.0%: ICD-10-PCS | Mod: CPTII,S$GLB,, | Performed by: PHYSICAL MEDICINE & REHABILITATION

## 2021-07-29 PROCEDURE — 99999 PR PBB SHADOW E&M-EST. PATIENT-LVL III: CPT | Mod: PBBFAC,,, | Performed by: PHYSICAL MEDICINE & REHABILITATION

## 2021-07-29 PROCEDURE — 1125F AMNT PAIN NOTED PAIN PRSNT: CPT | Mod: CPTII,S$GLB,, | Performed by: PHYSICAL MEDICINE & REHABILITATION

## 2021-07-29 PROCEDURE — 3008F PR BODY MASS INDEX (BMI) DOCUMENTED: ICD-10-PCS | Mod: CPTII,S$GLB,, | Performed by: PHYSICAL MEDICINE & REHABILITATION

## 2021-07-29 PROCEDURE — 99204 OFFICE O/P NEW MOD 45 MIN: CPT | Mod: S$GLB,,, | Performed by: PHYSICAL MEDICINE & REHABILITATION

## 2021-07-29 PROCEDURE — 3074F PR MOST RECENT SYSTOLIC BLOOD PRESSURE < 130 MM HG: ICD-10-PCS | Mod: CPTII,S$GLB,, | Performed by: PHYSICAL MEDICINE & REHABILITATION

## 2021-07-29 PROCEDURE — 3078F PR MOST RECENT DIASTOLIC BLOOD PRESSURE < 80 MM HG: ICD-10-PCS | Mod: CPTII,S$GLB,, | Performed by: PHYSICAL MEDICINE & REHABILITATION

## 2021-07-29 PROCEDURE — 3074F SYST BP LT 130 MM HG: CPT | Mod: CPTII,S$GLB,, | Performed by: PHYSICAL MEDICINE & REHABILITATION

## 2021-07-29 PROCEDURE — 3078F DIAST BP <80 MM HG: CPT | Mod: CPTII,S$GLB,, | Performed by: PHYSICAL MEDICINE & REHABILITATION

## 2021-07-29 PROCEDURE — 1159F PR MEDICATION LIST DOCUMENTED IN MEDICAL RECORD: ICD-10-PCS | Mod: CPTII,S$GLB,, | Performed by: PHYSICAL MEDICINE & REHABILITATION

## 2021-08-05 ENCOUNTER — HOSPITAL ENCOUNTER (OUTPATIENT)
Dept: RADIOLOGY | Facility: HOSPITAL | Age: 51
Discharge: HOME | End: 2021-08-05
Attending: PHYSICAL MEDICINE & REHABILITATION
Payer: COMMERCIAL

## 2021-08-05 DIAGNOSIS — M54.41 CHRONIC BILATERAL LOW BACK PAIN WITH RIGHT-SIDED SCIATICA: ICD-10-CM

## 2021-08-05 DIAGNOSIS — M51.37 DDD (DEGENERATIVE DISC DISEASE), LUMBOSACRAL: ICD-10-CM

## 2021-08-05 DIAGNOSIS — G89.29 CHRONIC BILATERAL LOW BACK PAIN WITH RIGHT-SIDED SCIATICA: ICD-10-CM

## 2021-08-05 PROCEDURE — 72148 MRI LUMBAR SPINE W/O DYE: CPT | Mod: TC

## 2021-08-05 PROCEDURE — 72148 MRI LUMBAR SPINE W/O DYE: CPT | Mod: 26,,, | Performed by: RADIOLOGY

## 2021-08-05 PROCEDURE — 72148 MRI LUMBAR SPINE WITHOUT CONTRAST: ICD-10-PCS | Mod: 26,,, | Performed by: RADIOLOGY

## 2021-08-06 ENCOUNTER — PATIENT MESSAGE (OUTPATIENT)
Dept: SPINE | Facility: CLINIC | Age: 51
End: 2021-08-06

## 2021-08-06 ENCOUNTER — PATIENT MESSAGE (OUTPATIENT)
Dept: GASTROENTEROLOGY | Facility: CLINIC | Age: 51
End: 2021-08-06

## 2021-08-06 DIAGNOSIS — K21.9 GASTROESOPHAGEAL REFLUX DISEASE, UNSPECIFIED WHETHER ESOPHAGITIS PRESENT: Primary | ICD-10-CM

## 2021-08-06 RX ORDER — OMEPRAZOLE 40 MG/1
40 CAPSULE, DELAYED RELEASE ORAL DAILY
Qty: 30 CAPSULE | Refills: 11 | Status: SHIPPED | OUTPATIENT
Start: 2021-08-06 | End: 2022-08-03 | Stop reason: SDUPTHER

## 2021-08-12 ENCOUNTER — CLINICAL SUPPORT (OUTPATIENT)
Dept: REHABILITATION | Facility: HOSPITAL | Age: 51
End: 2021-08-12
Attending: PHYSICAL MEDICINE & REHABILITATION
Payer: COMMERCIAL

## 2021-08-12 DIAGNOSIS — G89.29 CHRONIC BILATERAL LOW BACK PAIN WITH BILATERAL SCIATICA: ICD-10-CM

## 2021-08-12 DIAGNOSIS — M53.86 DECREASED RANGE OF MOTION OF INTERVERTEBRAL DISCS OF LUMBAR SPINE: ICD-10-CM

## 2021-08-12 DIAGNOSIS — M54.41 CHRONIC BILATERAL LOW BACK PAIN WITH BILATERAL SCIATICA: ICD-10-CM

## 2021-08-12 DIAGNOSIS — M54.42 CHRONIC BILATERAL LOW BACK PAIN WITH BILATERAL SCIATICA: ICD-10-CM

## 2021-08-12 DIAGNOSIS — R29.3 POSTURE IMBALANCE: ICD-10-CM

## 2021-08-12 DIAGNOSIS — R29.898 WEAKNESS OF BOTH LOWER EXTREMITIES: ICD-10-CM

## 2021-08-12 PROCEDURE — 97161 PT EVAL LOW COMPLEX 20 MIN: CPT | Mod: PN

## 2021-08-12 PROCEDURE — 97110 THERAPEUTIC EXERCISES: CPT | Mod: PN

## 2021-08-16 ENCOUNTER — OFFICE VISIT (OUTPATIENT)
Dept: NEUROLOGY | Facility: CLINIC | Age: 51
End: 2021-08-16
Payer: COMMERCIAL

## 2021-08-16 VITALS
SYSTOLIC BLOOD PRESSURE: 115 MMHG | HEART RATE: 74 BPM | BODY MASS INDEX: 35.38 KG/M2 | WEIGHT: 187.38 LBS | DIASTOLIC BLOOD PRESSURE: 82 MMHG | HEIGHT: 61 IN

## 2021-08-16 DIAGNOSIS — G89.29 CHRONIC BILATERAL LOW BACK PAIN WITH BILATERAL SCIATICA: Primary | ICD-10-CM

## 2021-08-16 DIAGNOSIS — M54.42 CHRONIC BILATERAL LOW BACK PAIN WITH BILATERAL SCIATICA: Primary | ICD-10-CM

## 2021-08-16 DIAGNOSIS — M54.17 LUMBOSACRAL RADICULOPATHY: ICD-10-CM

## 2021-08-16 DIAGNOSIS — M54.41 CHRONIC BILATERAL LOW BACK PAIN WITH BILATERAL SCIATICA: Primary | ICD-10-CM

## 2021-08-16 DIAGNOSIS — R29.898 WEAKNESS OF BOTH LOWER EXTREMITIES: ICD-10-CM

## 2021-08-16 PROCEDURE — 99999 PR PBB SHADOW E&M-EST. PATIENT-LVL II: CPT | Mod: PBBFAC,,, | Performed by: STUDENT IN AN ORGANIZED HEALTH CARE EDUCATION/TRAINING PROGRAM

## 2021-08-16 PROCEDURE — 99205 OFFICE O/P NEW HI 60 MIN: CPT | Mod: S$GLB,,, | Performed by: STUDENT IN AN ORGANIZED HEALTH CARE EDUCATION/TRAINING PROGRAM

## 2021-08-16 PROCEDURE — 99999 PR PBB SHADOW E&M-EST. PATIENT-LVL II: ICD-10-PCS | Mod: PBBFAC,,, | Performed by: STUDENT IN AN ORGANIZED HEALTH CARE EDUCATION/TRAINING PROGRAM

## 2021-08-16 PROCEDURE — 99205 PR OFFICE/OUTPT VISIT, NEW, LEVL V, 60-74 MIN: ICD-10-PCS | Mod: S$GLB,,, | Performed by: STUDENT IN AN ORGANIZED HEALTH CARE EDUCATION/TRAINING PROGRAM

## 2021-08-16 RX ORDER — GABAPENTIN 300 MG/1
300 CAPSULE ORAL NIGHTLY
Qty: 30 CAPSULE | Refills: 11 | Status: SHIPPED | OUTPATIENT
Start: 2021-08-16 | End: 2021-09-24

## 2021-08-17 ENCOUNTER — CLINICAL SUPPORT (OUTPATIENT)
Dept: REHABILITATION | Facility: HOSPITAL | Age: 51
End: 2021-08-17
Attending: PHYSICAL MEDICINE & REHABILITATION
Payer: COMMERCIAL

## 2021-08-17 DIAGNOSIS — M54.42 CHRONIC BILATERAL LOW BACK PAIN WITH BILATERAL SCIATICA: ICD-10-CM

## 2021-08-17 DIAGNOSIS — G89.29 CHRONIC BILATERAL LOW BACK PAIN WITH BILATERAL SCIATICA: ICD-10-CM

## 2021-08-17 DIAGNOSIS — M53.86 DECREASED RANGE OF MOTION OF INTERVERTEBRAL DISCS OF LUMBAR SPINE: ICD-10-CM

## 2021-08-17 DIAGNOSIS — M54.41 CHRONIC BILATERAL LOW BACK PAIN WITH BILATERAL SCIATICA: ICD-10-CM

## 2021-08-17 DIAGNOSIS — R29.898 WEAKNESS OF BOTH LOWER EXTREMITIES: ICD-10-CM

## 2021-08-17 DIAGNOSIS — R29.3 POSTURE IMBALANCE: ICD-10-CM

## 2021-08-17 PROCEDURE — 97110 THERAPEUTIC EXERCISES: CPT | Mod: PN

## 2021-08-17 PROCEDURE — 97140 MANUAL THERAPY 1/> REGIONS: CPT | Mod: PN

## 2021-08-20 ENCOUNTER — CLINICAL SUPPORT (OUTPATIENT)
Dept: REHABILITATION | Facility: HOSPITAL | Age: 51
End: 2021-08-20
Attending: PHYSICAL MEDICINE & REHABILITATION
Payer: COMMERCIAL

## 2021-08-20 DIAGNOSIS — M54.41 CHRONIC BILATERAL LOW BACK PAIN WITH BILATERAL SCIATICA: Primary | ICD-10-CM

## 2021-08-20 DIAGNOSIS — R29.898 WEAKNESS OF BOTH LOWER EXTREMITIES: ICD-10-CM

## 2021-08-20 DIAGNOSIS — R29.3 POSTURE IMBALANCE: ICD-10-CM

## 2021-08-20 DIAGNOSIS — M54.42 CHRONIC BILATERAL LOW BACK PAIN WITH BILATERAL SCIATICA: Primary | ICD-10-CM

## 2021-08-20 DIAGNOSIS — G89.29 CHRONIC BILATERAL LOW BACK PAIN WITH BILATERAL SCIATICA: Primary | ICD-10-CM

## 2021-08-20 DIAGNOSIS — M53.86 DECREASED RANGE OF MOTION OF INTERVERTEBRAL DISCS OF LUMBAR SPINE: ICD-10-CM

## 2021-08-20 PROCEDURE — 97110 THERAPEUTIC EXERCISES: CPT | Mod: PN,CQ

## 2021-09-10 ENCOUNTER — CLINICAL SUPPORT (OUTPATIENT)
Dept: REHABILITATION | Facility: HOSPITAL | Age: 51
End: 2021-09-10
Attending: PHYSICAL MEDICINE & REHABILITATION
Payer: COMMERCIAL

## 2021-09-10 DIAGNOSIS — M53.86 DECREASED RANGE OF MOTION OF INTERVERTEBRAL DISCS OF LUMBAR SPINE: ICD-10-CM

## 2021-09-10 DIAGNOSIS — R29.898 WEAKNESS OF BOTH LOWER EXTREMITIES: ICD-10-CM

## 2021-09-10 DIAGNOSIS — R29.3 POSTURE IMBALANCE: ICD-10-CM

## 2021-09-10 DIAGNOSIS — M54.42 CHRONIC BILATERAL LOW BACK PAIN WITH BILATERAL SCIATICA: ICD-10-CM

## 2021-09-10 DIAGNOSIS — G89.29 CHRONIC BILATERAL LOW BACK PAIN WITH BILATERAL SCIATICA: ICD-10-CM

## 2021-09-10 DIAGNOSIS — M54.41 CHRONIC BILATERAL LOW BACK PAIN WITH BILATERAL SCIATICA: ICD-10-CM

## 2021-09-10 PROCEDURE — 97110 THERAPEUTIC EXERCISES: CPT | Mod: PN

## 2021-09-15 ENCOUNTER — CLINICAL SUPPORT (OUTPATIENT)
Dept: REHABILITATION | Facility: HOSPITAL | Age: 51
End: 2021-09-15
Attending: PHYSICAL MEDICINE & REHABILITATION
Payer: COMMERCIAL

## 2021-09-15 DIAGNOSIS — M54.41 CHRONIC BILATERAL LOW BACK PAIN WITH BILATERAL SCIATICA: Primary | ICD-10-CM

## 2021-09-15 DIAGNOSIS — R29.3 POSTURE IMBALANCE: ICD-10-CM

## 2021-09-15 DIAGNOSIS — R29.898 WEAKNESS OF BOTH LOWER EXTREMITIES: ICD-10-CM

## 2021-09-15 DIAGNOSIS — G89.29 CHRONIC BILATERAL LOW BACK PAIN WITH BILATERAL SCIATICA: Primary | ICD-10-CM

## 2021-09-15 DIAGNOSIS — M53.86 DECREASED RANGE OF MOTION OF INTERVERTEBRAL DISCS OF LUMBAR SPINE: ICD-10-CM

## 2021-09-15 DIAGNOSIS — M54.42 CHRONIC BILATERAL LOW BACK PAIN WITH BILATERAL SCIATICA: Primary | ICD-10-CM

## 2021-09-15 PROCEDURE — 97110 THERAPEUTIC EXERCISES: CPT | Mod: PN

## 2021-09-17 ENCOUNTER — CLINICAL SUPPORT (OUTPATIENT)
Dept: REHABILITATION | Facility: HOSPITAL | Age: 51
End: 2021-09-17
Attending: PHYSICAL MEDICINE & REHABILITATION
Payer: COMMERCIAL

## 2021-09-17 DIAGNOSIS — M54.41 CHRONIC BILATERAL LOW BACK PAIN WITH BILATERAL SCIATICA: Primary | ICD-10-CM

## 2021-09-17 DIAGNOSIS — M53.86 DECREASED RANGE OF MOTION OF INTERVERTEBRAL DISCS OF LUMBAR SPINE: ICD-10-CM

## 2021-09-17 DIAGNOSIS — R29.3 POSTURE IMBALANCE: ICD-10-CM

## 2021-09-17 DIAGNOSIS — M54.42 CHRONIC BILATERAL LOW BACK PAIN WITH BILATERAL SCIATICA: Primary | ICD-10-CM

## 2021-09-17 DIAGNOSIS — R29.898 WEAKNESS OF BOTH LOWER EXTREMITIES: ICD-10-CM

## 2021-09-17 DIAGNOSIS — G89.29 CHRONIC BILATERAL LOW BACK PAIN WITH BILATERAL SCIATICA: Primary | ICD-10-CM

## 2021-09-17 PROCEDURE — 97110 THERAPEUTIC EXERCISES: CPT | Mod: PN,CQ

## 2021-09-22 ENCOUNTER — PATIENT MESSAGE (OUTPATIENT)
Dept: SPINE | Facility: CLINIC | Age: 51
End: 2021-09-22

## 2021-09-22 ENCOUNTER — CLINICAL SUPPORT (OUTPATIENT)
Dept: REHABILITATION | Facility: HOSPITAL | Age: 51
End: 2021-09-22
Attending: PHYSICAL MEDICINE & REHABILITATION
Payer: COMMERCIAL

## 2021-09-22 DIAGNOSIS — M53.86 DECREASED RANGE OF MOTION OF INTERVERTEBRAL DISCS OF LUMBAR SPINE: ICD-10-CM

## 2021-09-22 DIAGNOSIS — R29.898 WEAKNESS OF BOTH LOWER EXTREMITIES: ICD-10-CM

## 2021-09-22 DIAGNOSIS — G89.29 CHRONIC BILATERAL LOW BACK PAIN WITH BILATERAL SCIATICA: Primary | ICD-10-CM

## 2021-09-22 DIAGNOSIS — R29.3 POSTURE IMBALANCE: ICD-10-CM

## 2021-09-22 DIAGNOSIS — M54.41 CHRONIC BILATERAL LOW BACK PAIN WITH BILATERAL SCIATICA: Primary | ICD-10-CM

## 2021-09-22 DIAGNOSIS — M54.42 CHRONIC BILATERAL LOW BACK PAIN WITH BILATERAL SCIATICA: Primary | ICD-10-CM

## 2021-09-22 PROCEDURE — 97110 THERAPEUTIC EXERCISES: CPT | Mod: PN,CQ

## 2021-09-24 ENCOUNTER — OFFICE VISIT (OUTPATIENT)
Dept: SPINE | Facility: CLINIC | Age: 51
End: 2021-09-24
Attending: PHYSICAL MEDICINE & REHABILITATION
Payer: COMMERCIAL

## 2021-09-24 DIAGNOSIS — M25.561 PAIN IN BOTH KNEES, UNSPECIFIED CHRONICITY: ICD-10-CM

## 2021-09-24 DIAGNOSIS — M25.562 PAIN IN BOTH KNEES, UNSPECIFIED CHRONICITY: ICD-10-CM

## 2021-09-24 DIAGNOSIS — M54.41 CHRONIC BILATERAL LOW BACK PAIN WITH RIGHT-SIDED SCIATICA: Primary | ICD-10-CM

## 2021-09-24 DIAGNOSIS — M51.37 DDD (DEGENERATIVE DISC DISEASE), LUMBOSACRAL: ICD-10-CM

## 2021-09-24 DIAGNOSIS — G89.29 CHRONIC BILATERAL LOW BACK PAIN WITH RIGHT-SIDED SCIATICA: Primary | ICD-10-CM

## 2021-09-24 PROCEDURE — 3044F HG A1C LEVEL LT 7.0%: CPT | Mod: CPTII,95,, | Performed by: PHYSICAL MEDICINE & REHABILITATION

## 2021-09-24 PROCEDURE — 99214 OFFICE O/P EST MOD 30 MIN: CPT | Mod: 95,,, | Performed by: PHYSICAL MEDICINE & REHABILITATION

## 2021-09-24 PROCEDURE — 1160F PR REVIEW ALL MEDS BY PRESCRIBER/CLIN PHARMACIST DOCUMENTED: ICD-10-PCS | Mod: CPTII,95,, | Performed by: PHYSICAL MEDICINE & REHABILITATION

## 2021-09-24 PROCEDURE — 1159F MED LIST DOCD IN RCRD: CPT | Mod: CPTII,95,, | Performed by: PHYSICAL MEDICINE & REHABILITATION

## 2021-09-24 PROCEDURE — 99214 PR OFFICE/OUTPT VISIT, EST, LEVL IV, 30-39 MIN: ICD-10-PCS | Mod: 95,,, | Performed by: PHYSICAL MEDICINE & REHABILITATION

## 2021-09-24 PROCEDURE — 1159F PR MEDICATION LIST DOCUMENTED IN MEDICAL RECORD: ICD-10-PCS | Mod: CPTII,95,, | Performed by: PHYSICAL MEDICINE & REHABILITATION

## 2021-09-24 PROCEDURE — 1160F RVW MEDS BY RX/DR IN RCRD: CPT | Mod: CPTII,95,, | Performed by: PHYSICAL MEDICINE & REHABILITATION

## 2021-09-24 PROCEDURE — 3044F PR MOST RECENT HEMOGLOBIN A1C LEVEL <7.0%: ICD-10-PCS | Mod: CPTII,95,, | Performed by: PHYSICAL MEDICINE & REHABILITATION

## 2021-09-24 RX ORDER — DICLOFENAC SODIUM 75 MG/1
75 TABLET, DELAYED RELEASE ORAL 2 TIMES DAILY PRN
Qty: 60 TABLET | Refills: 2 | Status: SHIPPED | OUTPATIENT
Start: 2021-09-24 | End: 2022-05-26 | Stop reason: SDUPTHER

## 2021-09-29 ENCOUNTER — CLINICAL SUPPORT (OUTPATIENT)
Dept: REHABILITATION | Facility: HOSPITAL | Age: 51
End: 2021-09-29
Attending: PHYSICAL MEDICINE & REHABILITATION
Payer: COMMERCIAL

## 2021-09-29 DIAGNOSIS — M25.561 PAIN IN BOTH KNEES, UNSPECIFIED CHRONICITY: Primary | ICD-10-CM

## 2021-09-29 DIAGNOSIS — M54.41 CHRONIC BILATERAL LOW BACK PAIN WITH BILATERAL SCIATICA: Primary | ICD-10-CM

## 2021-09-29 DIAGNOSIS — M54.42 CHRONIC BILATERAL LOW BACK PAIN WITH BILATERAL SCIATICA: Primary | ICD-10-CM

## 2021-09-29 DIAGNOSIS — R29.898 WEAKNESS OF BOTH LOWER EXTREMITIES: ICD-10-CM

## 2021-09-29 DIAGNOSIS — R29.3 POSTURE IMBALANCE: ICD-10-CM

## 2021-09-29 DIAGNOSIS — M53.86 DECREASED RANGE OF MOTION OF INTERVERTEBRAL DISCS OF LUMBAR SPINE: ICD-10-CM

## 2021-09-29 DIAGNOSIS — M25.562 PAIN IN BOTH KNEES, UNSPECIFIED CHRONICITY: Primary | ICD-10-CM

## 2021-09-29 DIAGNOSIS — G89.29 CHRONIC BILATERAL LOW BACK PAIN WITH BILATERAL SCIATICA: Primary | ICD-10-CM

## 2021-09-29 PROCEDURE — 97110 THERAPEUTIC EXERCISES: CPT | Mod: PN,CQ

## 2021-09-30 ENCOUNTER — OFFICE VISIT (OUTPATIENT)
Dept: ORTHOPEDICS | Facility: CLINIC | Age: 51
End: 2021-09-30
Attending: ORTHOPAEDIC SURGERY
Payer: COMMERCIAL

## 2021-09-30 ENCOUNTER — PATIENT MESSAGE (OUTPATIENT)
Dept: REHABILITATION | Facility: HOSPITAL | Age: 51
End: 2021-09-30

## 2021-09-30 VITALS
TEMPERATURE: 98 F | BODY MASS INDEX: 35.84 KG/M2 | HEIGHT: 61 IN | WEIGHT: 189.81 LBS | RESPIRATION RATE: 18 BRPM | SYSTOLIC BLOOD PRESSURE: 115 MMHG | OXYGEN SATURATION: 97 % | HEART RATE: 87 BPM | DIASTOLIC BLOOD PRESSURE: 82 MMHG

## 2021-09-30 DIAGNOSIS — M25.561 PAIN IN BOTH KNEES, UNSPECIFIED CHRONICITY: Primary | ICD-10-CM

## 2021-09-30 DIAGNOSIS — M25.562 PAIN IN BOTH KNEES, UNSPECIFIED CHRONICITY: Primary | ICD-10-CM

## 2021-09-30 PROCEDURE — 99214 OFFICE O/P EST MOD 30 MIN: CPT | Mod: S$GLB,,, | Performed by: ORTHOPAEDIC SURGERY

## 2021-09-30 PROCEDURE — 3044F HG A1C LEVEL LT 7.0%: CPT | Mod: CPTII,S$GLB,, | Performed by: ORTHOPAEDIC SURGERY

## 2021-09-30 PROCEDURE — 3074F PR MOST RECENT SYSTOLIC BLOOD PRESSURE < 130 MM HG: ICD-10-PCS | Mod: CPTII,S$GLB,, | Performed by: ORTHOPAEDIC SURGERY

## 2021-09-30 PROCEDURE — 99999 PR PBB SHADOW E&M-EST. PATIENT-LVL IV: CPT | Mod: PBBFAC,,, | Performed by: ORTHOPAEDIC SURGERY

## 2021-09-30 PROCEDURE — 3008F BODY MASS INDEX DOCD: CPT | Mod: CPTII,S$GLB,, | Performed by: ORTHOPAEDIC SURGERY

## 2021-09-30 PROCEDURE — 3079F PR MOST RECENT DIASTOLIC BLOOD PRESSURE 80-89 MM HG: ICD-10-PCS | Mod: CPTII,S$GLB,, | Performed by: ORTHOPAEDIC SURGERY

## 2021-09-30 PROCEDURE — 3044F PR MOST RECENT HEMOGLOBIN A1C LEVEL <7.0%: ICD-10-PCS | Mod: CPTII,S$GLB,, | Performed by: ORTHOPAEDIC SURGERY

## 2021-09-30 PROCEDURE — 99214 PR OFFICE/OUTPT VISIT, EST, LEVL IV, 30-39 MIN: ICD-10-PCS | Mod: S$GLB,,, | Performed by: ORTHOPAEDIC SURGERY

## 2021-09-30 PROCEDURE — 1159F MED LIST DOCD IN RCRD: CPT | Mod: CPTII,S$GLB,, | Performed by: ORTHOPAEDIC SURGERY

## 2021-09-30 PROCEDURE — 3079F DIAST BP 80-89 MM HG: CPT | Mod: CPTII,S$GLB,, | Performed by: ORTHOPAEDIC SURGERY

## 2021-09-30 PROCEDURE — 1160F RVW MEDS BY RX/DR IN RCRD: CPT | Mod: CPTII,S$GLB,, | Performed by: ORTHOPAEDIC SURGERY

## 2021-09-30 PROCEDURE — 1160F PR REVIEW ALL MEDS BY PRESCRIBER/CLIN PHARMACIST DOCUMENTED: ICD-10-PCS | Mod: CPTII,S$GLB,, | Performed by: ORTHOPAEDIC SURGERY

## 2021-09-30 PROCEDURE — 99999 PR PBB SHADOW E&M-EST. PATIENT-LVL IV: ICD-10-PCS | Mod: PBBFAC,,, | Performed by: ORTHOPAEDIC SURGERY

## 2021-09-30 PROCEDURE — 3074F SYST BP LT 130 MM HG: CPT | Mod: CPTII,S$GLB,, | Performed by: ORTHOPAEDIC SURGERY

## 2021-09-30 PROCEDURE — 3008F PR BODY MASS INDEX (BMI) DOCUMENTED: ICD-10-PCS | Mod: CPTII,S$GLB,, | Performed by: ORTHOPAEDIC SURGERY

## 2021-09-30 PROCEDURE — 1159F PR MEDICATION LIST DOCUMENTED IN MEDICAL RECORD: ICD-10-PCS | Mod: CPTII,S$GLB,, | Performed by: ORTHOPAEDIC SURGERY

## 2021-10-06 ENCOUNTER — CLINICAL SUPPORT (OUTPATIENT)
Dept: REHABILITATION | Facility: HOSPITAL | Age: 51
End: 2021-10-06
Attending: PHYSICAL MEDICINE & REHABILITATION
Payer: COMMERCIAL

## 2021-10-06 DIAGNOSIS — R29.3 POSTURE IMBALANCE: ICD-10-CM

## 2021-10-06 DIAGNOSIS — M54.42 CHRONIC BILATERAL LOW BACK PAIN WITH BILATERAL SCIATICA: ICD-10-CM

## 2021-10-06 DIAGNOSIS — G89.29 CHRONIC PAIN OF RIGHT KNEE: ICD-10-CM

## 2021-10-06 DIAGNOSIS — R29.898 WEAKNESS OF BOTH LOWER EXTREMITIES: ICD-10-CM

## 2021-10-06 DIAGNOSIS — M54.41 CHRONIC BILATERAL LOW BACK PAIN WITH BILATERAL SCIATICA: ICD-10-CM

## 2021-10-06 DIAGNOSIS — M25.561 CHRONIC PAIN OF RIGHT KNEE: ICD-10-CM

## 2021-10-06 DIAGNOSIS — M53.86 DECREASED RANGE OF MOTION OF INTERVERTEBRAL DISCS OF LUMBAR SPINE: ICD-10-CM

## 2021-10-06 DIAGNOSIS — G89.29 CHRONIC BILATERAL LOW BACK PAIN WITH BILATERAL SCIATICA: ICD-10-CM

## 2021-10-06 PROCEDURE — 97110 THERAPEUTIC EXERCISES: CPT | Mod: PN

## 2021-10-13 ENCOUNTER — CLINICAL SUPPORT (OUTPATIENT)
Dept: REHABILITATION | Facility: HOSPITAL | Age: 51
End: 2021-10-13
Attending: PHYSICAL MEDICINE & REHABILITATION
Payer: COMMERCIAL

## 2021-10-13 DIAGNOSIS — R29.3 POSTURE IMBALANCE: ICD-10-CM

## 2021-10-13 DIAGNOSIS — M54.42 CHRONIC BILATERAL LOW BACK PAIN WITH BILATERAL SCIATICA: Primary | ICD-10-CM

## 2021-10-13 DIAGNOSIS — M53.86 DECREASED RANGE OF MOTION OF INTERVERTEBRAL DISCS OF LUMBAR SPINE: ICD-10-CM

## 2021-10-13 DIAGNOSIS — M54.41 CHRONIC BILATERAL LOW BACK PAIN WITH BILATERAL SCIATICA: Primary | ICD-10-CM

## 2021-10-13 DIAGNOSIS — M25.561 CHRONIC PAIN OF RIGHT KNEE: ICD-10-CM

## 2021-10-13 DIAGNOSIS — R29.898 WEAKNESS OF BOTH LOWER EXTREMITIES: ICD-10-CM

## 2021-10-13 DIAGNOSIS — G89.29 CHRONIC PAIN OF RIGHT KNEE: ICD-10-CM

## 2021-10-13 DIAGNOSIS — G89.29 CHRONIC BILATERAL LOW BACK PAIN WITH BILATERAL SCIATICA: Primary | ICD-10-CM

## 2021-10-13 PROCEDURE — 97110 THERAPEUTIC EXERCISES: CPT | Mod: PN,CQ

## 2021-10-15 ENCOUNTER — CLINICAL SUPPORT (OUTPATIENT)
Dept: REHABILITATION | Facility: HOSPITAL | Age: 51
End: 2021-10-15
Attending: PHYSICAL MEDICINE & REHABILITATION
Payer: COMMERCIAL

## 2021-10-15 DIAGNOSIS — G89.29 CHRONIC BILATERAL LOW BACK PAIN WITH BILATERAL SCIATICA: ICD-10-CM

## 2021-10-15 DIAGNOSIS — R29.3 POSTURE IMBALANCE: ICD-10-CM

## 2021-10-15 DIAGNOSIS — M25.561 CHRONIC PAIN OF RIGHT KNEE: ICD-10-CM

## 2021-10-15 DIAGNOSIS — M54.42 CHRONIC BILATERAL LOW BACK PAIN WITH BILATERAL SCIATICA: ICD-10-CM

## 2021-10-15 DIAGNOSIS — G89.29 CHRONIC PAIN OF RIGHT KNEE: ICD-10-CM

## 2021-10-15 DIAGNOSIS — R29.898 WEAKNESS OF BOTH LOWER EXTREMITIES: ICD-10-CM

## 2021-10-15 DIAGNOSIS — M54.41 CHRONIC BILATERAL LOW BACK PAIN WITH BILATERAL SCIATICA: ICD-10-CM

## 2021-10-15 DIAGNOSIS — M53.86 DECREASED RANGE OF MOTION OF INTERVERTEBRAL DISCS OF LUMBAR SPINE: ICD-10-CM

## 2021-10-15 PROCEDURE — 97110 THERAPEUTIC EXERCISES: CPT | Mod: PN

## 2021-10-20 ENCOUNTER — CLINICAL SUPPORT (OUTPATIENT)
Dept: REHABILITATION | Facility: HOSPITAL | Age: 51
End: 2021-10-20
Attending: PHYSICAL MEDICINE & REHABILITATION
Payer: COMMERCIAL

## 2021-10-20 DIAGNOSIS — R29.3 POSTURE IMBALANCE: ICD-10-CM

## 2021-10-20 DIAGNOSIS — G89.29 CHRONIC BILATERAL LOW BACK PAIN WITH BILATERAL SCIATICA: Primary | ICD-10-CM

## 2021-10-20 DIAGNOSIS — R29.898 WEAKNESS OF BOTH LOWER EXTREMITIES: ICD-10-CM

## 2021-10-20 DIAGNOSIS — M53.86 DECREASED RANGE OF MOTION OF INTERVERTEBRAL DISCS OF LUMBAR SPINE: ICD-10-CM

## 2021-10-20 DIAGNOSIS — G89.29 CHRONIC PAIN OF RIGHT KNEE: ICD-10-CM

## 2021-10-20 DIAGNOSIS — M25.561 CHRONIC PAIN OF RIGHT KNEE: ICD-10-CM

## 2021-10-20 DIAGNOSIS — M54.41 CHRONIC BILATERAL LOW BACK PAIN WITH BILATERAL SCIATICA: Primary | ICD-10-CM

## 2021-10-20 DIAGNOSIS — M54.42 CHRONIC BILATERAL LOW BACK PAIN WITH BILATERAL SCIATICA: Primary | ICD-10-CM

## 2021-10-20 PROCEDURE — 97110 THERAPEUTIC EXERCISES: CPT | Mod: PN,CQ

## 2021-10-22 ENCOUNTER — CLINICAL SUPPORT (OUTPATIENT)
Dept: REHABILITATION | Facility: HOSPITAL | Age: 51
End: 2021-10-22
Attending: PHYSICAL MEDICINE & REHABILITATION
Payer: COMMERCIAL

## 2021-10-22 DIAGNOSIS — G89.29 CHRONIC PAIN OF RIGHT KNEE: ICD-10-CM

## 2021-10-22 DIAGNOSIS — M54.42 CHRONIC BILATERAL LOW BACK PAIN WITH BILATERAL SCIATICA: ICD-10-CM

## 2021-10-22 DIAGNOSIS — R29.898 WEAKNESS OF BOTH LOWER EXTREMITIES: ICD-10-CM

## 2021-10-22 DIAGNOSIS — M53.86 DECREASED RANGE OF MOTION OF INTERVERTEBRAL DISCS OF LUMBAR SPINE: ICD-10-CM

## 2021-10-22 DIAGNOSIS — M54.41 CHRONIC BILATERAL LOW BACK PAIN WITH BILATERAL SCIATICA: ICD-10-CM

## 2021-10-22 DIAGNOSIS — G89.29 CHRONIC BILATERAL LOW BACK PAIN WITH BILATERAL SCIATICA: ICD-10-CM

## 2021-10-22 DIAGNOSIS — M25.561 CHRONIC PAIN OF RIGHT KNEE: ICD-10-CM

## 2021-10-22 DIAGNOSIS — R29.3 POSTURE IMBALANCE: ICD-10-CM

## 2021-10-22 PROCEDURE — 97110 THERAPEUTIC EXERCISES: CPT | Mod: PN

## 2021-10-27 ENCOUNTER — CLINICAL SUPPORT (OUTPATIENT)
Dept: REHABILITATION | Facility: HOSPITAL | Age: 51
End: 2021-10-27
Attending: PHYSICAL MEDICINE & REHABILITATION
Payer: COMMERCIAL

## 2021-10-27 DIAGNOSIS — M54.41 CHRONIC BILATERAL LOW BACK PAIN WITH BILATERAL SCIATICA: Primary | ICD-10-CM

## 2021-10-27 DIAGNOSIS — M25.561 CHRONIC PAIN OF RIGHT KNEE: ICD-10-CM

## 2021-10-27 DIAGNOSIS — G89.29 CHRONIC BILATERAL LOW BACK PAIN WITH BILATERAL SCIATICA: Primary | ICD-10-CM

## 2021-10-27 DIAGNOSIS — R29.3 POSTURE IMBALANCE: ICD-10-CM

## 2021-10-27 DIAGNOSIS — R29.898 WEAKNESS OF BOTH LOWER EXTREMITIES: ICD-10-CM

## 2021-10-27 DIAGNOSIS — M54.42 CHRONIC BILATERAL LOW BACK PAIN WITH BILATERAL SCIATICA: Primary | ICD-10-CM

## 2021-10-27 DIAGNOSIS — G89.29 CHRONIC PAIN OF RIGHT KNEE: ICD-10-CM

## 2021-10-27 DIAGNOSIS — M53.86 DECREASED RANGE OF MOTION OF INTERVERTEBRAL DISCS OF LUMBAR SPINE: ICD-10-CM

## 2021-10-27 PROCEDURE — 97110 THERAPEUTIC EXERCISES: CPT | Mod: PN,CQ

## 2021-10-29 ENCOUNTER — CLINICAL SUPPORT (OUTPATIENT)
Dept: REHABILITATION | Facility: HOSPITAL | Age: 51
End: 2021-10-29
Attending: PHYSICAL MEDICINE & REHABILITATION
Payer: COMMERCIAL

## 2021-10-29 DIAGNOSIS — R29.3 POSTURE IMBALANCE: ICD-10-CM

## 2021-10-29 DIAGNOSIS — M53.86 DECREASED RANGE OF MOTION OF INTERVERTEBRAL DISCS OF LUMBAR SPINE: ICD-10-CM

## 2021-10-29 DIAGNOSIS — G89.29 CHRONIC BILATERAL LOW BACK PAIN WITH BILATERAL SCIATICA: Primary | ICD-10-CM

## 2021-10-29 DIAGNOSIS — M54.42 CHRONIC BILATERAL LOW BACK PAIN WITH BILATERAL SCIATICA: Primary | ICD-10-CM

## 2021-10-29 DIAGNOSIS — G89.29 CHRONIC PAIN OF RIGHT KNEE: ICD-10-CM

## 2021-10-29 DIAGNOSIS — R29.898 WEAKNESS OF BOTH LOWER EXTREMITIES: ICD-10-CM

## 2021-10-29 DIAGNOSIS — M54.41 CHRONIC BILATERAL LOW BACK PAIN WITH BILATERAL SCIATICA: Primary | ICD-10-CM

## 2021-10-29 DIAGNOSIS — M25.561 CHRONIC PAIN OF RIGHT KNEE: ICD-10-CM

## 2021-10-29 PROCEDURE — 97110 THERAPEUTIC EXERCISES: CPT | Mod: PN,CQ

## 2021-11-03 ENCOUNTER — CLINICAL SUPPORT (OUTPATIENT)
Dept: REHABILITATION | Facility: HOSPITAL | Age: 51
End: 2021-11-03
Attending: PHYSICAL MEDICINE & REHABILITATION
Payer: COMMERCIAL

## 2021-11-03 DIAGNOSIS — M54.41 CHRONIC BILATERAL LOW BACK PAIN WITH BILATERAL SCIATICA: ICD-10-CM

## 2021-11-03 DIAGNOSIS — G89.29 CHRONIC PAIN OF RIGHT KNEE: ICD-10-CM

## 2021-11-03 DIAGNOSIS — M25.561 CHRONIC PAIN OF RIGHT KNEE: ICD-10-CM

## 2021-11-03 DIAGNOSIS — G89.29 CHRONIC BILATERAL LOW BACK PAIN WITH BILATERAL SCIATICA: ICD-10-CM

## 2021-11-03 DIAGNOSIS — R29.898 WEAKNESS OF BOTH LOWER EXTREMITIES: ICD-10-CM

## 2021-11-03 DIAGNOSIS — R29.3 POSTURE IMBALANCE: ICD-10-CM

## 2021-11-03 DIAGNOSIS — M54.42 CHRONIC BILATERAL LOW BACK PAIN WITH BILATERAL SCIATICA: ICD-10-CM

## 2021-11-03 DIAGNOSIS — M53.86 DECREASED RANGE OF MOTION OF INTERVERTEBRAL DISCS OF LUMBAR SPINE: ICD-10-CM

## 2021-11-03 PROCEDURE — 97110 THERAPEUTIC EXERCISES: CPT | Mod: PN

## 2021-11-05 ENCOUNTER — CLINICAL SUPPORT (OUTPATIENT)
Dept: REHABILITATION | Facility: HOSPITAL | Age: 51
End: 2021-11-05
Attending: PHYSICAL MEDICINE & REHABILITATION
Payer: COMMERCIAL

## 2021-11-05 DIAGNOSIS — R29.898 WEAKNESS OF BOTH LOWER EXTREMITIES: ICD-10-CM

## 2021-11-05 DIAGNOSIS — R29.3 POSTURE IMBALANCE: ICD-10-CM

## 2021-11-05 DIAGNOSIS — M25.561 CHRONIC PAIN OF RIGHT KNEE: ICD-10-CM

## 2021-11-05 DIAGNOSIS — G89.29 CHRONIC PAIN OF RIGHT KNEE: ICD-10-CM

## 2021-11-05 DIAGNOSIS — G89.29 CHRONIC BILATERAL LOW BACK PAIN WITH BILATERAL SCIATICA: ICD-10-CM

## 2021-11-05 DIAGNOSIS — M54.42 CHRONIC BILATERAL LOW BACK PAIN WITH BILATERAL SCIATICA: ICD-10-CM

## 2021-11-05 DIAGNOSIS — M54.41 CHRONIC BILATERAL LOW BACK PAIN WITH BILATERAL SCIATICA: ICD-10-CM

## 2021-11-05 DIAGNOSIS — M53.86 DECREASED RANGE OF MOTION OF INTERVERTEBRAL DISCS OF LUMBAR SPINE: ICD-10-CM

## 2021-11-05 PROCEDURE — 97110 THERAPEUTIC EXERCISES: CPT | Mod: PN

## 2021-12-08 DIAGNOSIS — Z11.59 NEED FOR HEPATITIS C SCREENING TEST: ICD-10-CM

## 2022-03-24 ENCOUNTER — PATIENT MESSAGE (OUTPATIENT)
Dept: ORTHOPEDICS | Facility: CLINIC | Age: 52
End: 2022-03-24
Payer: COMMERCIAL

## 2022-03-24 NOTE — TELEPHONE ENCOUNTER
Left VM for patient to give us a call to reschedule the given appt if she is unavailable that day.

## 2022-04-04 ENCOUNTER — PATIENT MESSAGE (OUTPATIENT)
Dept: ORTHOPEDICS | Facility: CLINIC | Age: 52
End: 2022-04-04
Payer: COMMERCIAL

## 2022-04-04 DIAGNOSIS — M79.641 HAND PAIN, RIGHT: Primary | ICD-10-CM

## 2022-04-04 DIAGNOSIS — M25.521 ELBOW PAIN, RIGHT: ICD-10-CM

## 2022-04-05 ENCOUNTER — TELEPHONE (OUTPATIENT)
Dept: FAMILY MEDICINE | Facility: CLINIC | Age: 52
End: 2022-04-05
Payer: COMMERCIAL

## 2022-04-07 ENCOUNTER — IMMUNIZATION (OUTPATIENT)
Dept: PHARMACY | Facility: CLINIC | Age: 52
End: 2022-04-07
Payer: COMMERCIAL

## 2022-04-07 DIAGNOSIS — Z23 NEED FOR VACCINATION: Primary | ICD-10-CM

## 2022-04-12 ENCOUNTER — PATIENT OUTREACH (OUTPATIENT)
Dept: ADMINISTRATIVE | Facility: OTHER | Age: 52
End: 2022-04-12
Payer: COMMERCIAL

## 2022-04-12 DIAGNOSIS — Z12.11 SCREEN FOR COLON CANCER: Primary | ICD-10-CM

## 2022-04-12 NOTE — PROGRESS NOTES
Health Maintenance Due   Topic Date Due    Hepatitis C Screening  Never done    Colorectal Cancer Screening  Never done    Influenza Vaccine (1) Never done     Updates were requested from care everywhere.  Chart was reviewed for overdue [facility]active [facility] Encounters ([facility]) topics (CRS, Breast Cancer Screening, Eye exam)  Health Maintenance has been updated.  LINKS immunization registry triggered.  Immunizations were reconciled.

## 2022-04-13 ENCOUNTER — APPOINTMENT (OUTPATIENT)
Dept: RADIOLOGY | Facility: HOSPITAL | Age: 52
End: 2022-04-13
Attending: PHYSICIAN ASSISTANT
Payer: COMMERCIAL

## 2022-04-13 ENCOUNTER — OFFICE VISIT (OUTPATIENT)
Dept: ORTHOPEDICS | Facility: CLINIC | Age: 52
End: 2022-04-13
Attending: PHYSICIAN ASSISTANT
Payer: COMMERCIAL

## 2022-04-13 VITALS
DIASTOLIC BLOOD PRESSURE: 64 MMHG | SYSTOLIC BLOOD PRESSURE: 132 MMHG | WEIGHT: 196.63 LBS | RESPIRATION RATE: 18 BRPM | HEART RATE: 80 BPM | OXYGEN SATURATION: 98 % | BODY MASS INDEX: 37.16 KG/M2

## 2022-04-13 DIAGNOSIS — M77.11 LATERAL EPICONDYLITIS, RIGHT ELBOW: ICD-10-CM

## 2022-04-13 DIAGNOSIS — M79.641 BILATERAL HAND PAIN: Primary | ICD-10-CM

## 2022-04-13 DIAGNOSIS — M79.642 BILATERAL HAND PAIN: Primary | ICD-10-CM

## 2022-04-13 DIAGNOSIS — G56.03 BILATERAL CARPAL TUNNEL SYNDROME: ICD-10-CM

## 2022-04-13 DIAGNOSIS — M77.12 LATERAL EPICONDYLITIS OF LEFT ELBOW: ICD-10-CM

## 2022-04-13 DIAGNOSIS — M79.641 HAND PAIN, RIGHT: ICD-10-CM

## 2022-04-13 DIAGNOSIS — M25.521 ELBOW PAIN, RIGHT: ICD-10-CM

## 2022-04-13 PROCEDURE — 73080 XR ELBOW COMPLETE 3 VIEW RIGHT: ICD-10-PCS | Mod: 26,RT,, | Performed by: RADIOLOGY

## 2022-04-13 PROCEDURE — 3078F PR MOST RECENT DIASTOLIC BLOOD PRESSURE < 80 MM HG: ICD-10-PCS | Mod: CPTII,S$GLB,, | Performed by: PHYSICIAN ASSISTANT

## 2022-04-13 PROCEDURE — 73130 X-RAY EXAM OF HAND: CPT | Mod: 26,RT,, | Performed by: RADIOLOGY

## 2022-04-13 PROCEDURE — 73130 XR HAND COMPLETE 3 VIEW RIGHT: ICD-10-PCS | Mod: 26,RT,, | Performed by: RADIOLOGY

## 2022-04-13 PROCEDURE — 3075F PR MOST RECENT SYSTOLIC BLOOD PRESS GE 130-139MM HG: ICD-10-PCS | Mod: CPTII,S$GLB,, | Performed by: PHYSICIAN ASSISTANT

## 2022-04-13 PROCEDURE — 99999 PR PBB SHADOW E&M-EST. PATIENT-LVL IV: CPT | Mod: PBBFAC,,, | Performed by: PHYSICIAN ASSISTANT

## 2022-04-13 PROCEDURE — 73130 X-RAY EXAM OF HAND: CPT | Mod: TC,FY,PN,RT

## 2022-04-13 PROCEDURE — 73080 X-RAY EXAM OF ELBOW: CPT | Mod: TC,FY,PN,RT

## 2022-04-13 PROCEDURE — 3078F DIAST BP <80 MM HG: CPT | Mod: CPTII,S$GLB,, | Performed by: PHYSICIAN ASSISTANT

## 2022-04-13 PROCEDURE — 1160F PR REVIEW ALL MEDS BY PRESCRIBER/CLIN PHARMACIST DOCUMENTED: ICD-10-PCS | Mod: CPTII,S$GLB,, | Performed by: PHYSICIAN ASSISTANT

## 2022-04-13 PROCEDURE — 99214 PR OFFICE/OUTPT VISIT, EST, LEVL IV, 30-39 MIN: ICD-10-PCS | Mod: S$GLB,,, | Performed by: PHYSICIAN ASSISTANT

## 2022-04-13 PROCEDURE — 73080 X-RAY EXAM OF ELBOW: CPT | Mod: 26,RT,, | Performed by: RADIOLOGY

## 2022-04-13 PROCEDURE — 3008F BODY MASS INDEX DOCD: CPT | Mod: CPTII,S$GLB,, | Performed by: PHYSICIAN ASSISTANT

## 2022-04-13 PROCEDURE — 1159F MED LIST DOCD IN RCRD: CPT | Mod: CPTII,S$GLB,, | Performed by: PHYSICIAN ASSISTANT

## 2022-04-13 PROCEDURE — 1159F PR MEDICATION LIST DOCUMENTED IN MEDICAL RECORD: ICD-10-PCS | Mod: CPTII,S$GLB,, | Performed by: PHYSICIAN ASSISTANT

## 2022-04-13 PROCEDURE — 99214 OFFICE O/P EST MOD 30 MIN: CPT | Mod: S$GLB,,, | Performed by: PHYSICIAN ASSISTANT

## 2022-04-13 PROCEDURE — 99999 PR PBB SHADOW E&M-EST. PATIENT-LVL IV: ICD-10-PCS | Mod: PBBFAC,,, | Performed by: PHYSICIAN ASSISTANT

## 2022-04-13 PROCEDURE — 3075F SYST BP GE 130 - 139MM HG: CPT | Mod: CPTII,S$GLB,, | Performed by: PHYSICIAN ASSISTANT

## 2022-04-13 PROCEDURE — 3008F PR BODY MASS INDEX (BMI) DOCUMENTED: ICD-10-PCS | Mod: CPTII,S$GLB,, | Performed by: PHYSICIAN ASSISTANT

## 2022-04-13 PROCEDURE — 1160F RVW MEDS BY RX/DR IN RCRD: CPT | Mod: CPTII,S$GLB,, | Performed by: PHYSICIAN ASSISTANT

## 2022-04-13 NOTE — PROGRESS NOTES
Chief Complaint & History of Present Illness:  Cheryl Espinosa is a 51 y.o. year old female presenting with right elbow pain for the past several weeks.  She also has pain in her right shoulder.  She has similar symptoms on the left, but the right is worse.  She is right hand dominant. There is not a history of trauma.  The pain is located on the lateral aspect of the elbow.  The shoulder pain is in the posterior aspect, with radiation down the arm.  Symptoms are worse at night.  She does have numbness in her arm. The pain is described as achy.  There is not a history of previous injury or surgery to the elbow.    She also has bilateral hand pain- right greater than left.  She has pain mostly in the index and long fingers, at the PIP and DIP joints.  She has popping.  No catching or triggering. She has intermittent sharp pains that radiate through her fingers.  She has history of CTS- diagnosed in 2007 by Dr. Luevano.  Has history of 3 carpal tunnel injections- most recently was in July 2020 with Dr. Barraza. She does continue to have intermittent numbness and tingling.  She has tried night splints.  She does take diclofenac occasionally for her knee pain- at most she will take it once per week.     Review of patient's allergies indicates:   Allergen Reactions    Doxycycline Anaphylaxis, Nausea And Vomiting and Other (See Comments)    Medrol [methylprednisolone] Shortness Of Breath and Rash    Nsaids (non-steroidal anti-inflammatory drug) Nausea And Vomiting     Nausea          Current Outpatient Medications   Medication Sig Dispense Refill    diclofenac (VOLTAREN) 75 MG EC tablet Take 1 tablet (75 mg total) by mouth 2 (two) times daily as needed (back pain). 60 tablet 2    omeprazole (PRILOSEC) 40 MG capsule Take 1 capsule (40 mg total) by mouth once daily. 30 capsule 11    VALIUM 5 mg tablet Take 1 tablet (5 mg total) by mouth every 12 (twelve) hours as needed (back pain). 60 tablet 1     No current  facility-administered medications for this visit.       Past Medical History:   Diagnosis Date    Carpal tunnel syndrome     GERD (gastroesophageal reflux disease)     History of degenerative disc disease     Metatarsalgia        No past surgical history on file.    Vital Signs (Most Recent)  Vitals:    04/13/22 1237   BP: 132/64   Pulse: 80   Resp: 18           Review of Systems:  ROS:  Constitutional: no fever or chills  Eyes: no visual changes  ENT: no nasal congestion or sore throat  Respiratory: no cough or shortness of breath  Cardiovascular: no chest pain or palpitations  Musculoskeletal: no arthralgias or myalgias        OBJECTIVE:     PHYSICAL EXAM:    Weight: 89.2 kg (196 lb 10.4 oz), General Appearance: Well nourished, well developed, in no acute distress.  CV: 2+ UE/LE distal pulses bilaterally.  Resp:  Respirations equal and unlabored.  Neurological: Mood & affect are normal.  GI: Abdomen soft and non-tender.  Bilateral  Elbow:   Skin intact  No erythema or warmth  TTP along lateral epicondyle  FROM  + long finger test  Pain with resisted wrist extension and supination  ltsi C5-T1  + epl, io, fds, fdp   2 + RP     Right shoulder  ROM is mildly painful  Pain worse with abduction and IR  , ER 50, IR L1  + impingement  + laird  + Yasmany's    Bilateral hand  Skin intact  No swelling or warmth  FROM in digits  TTP along long finger DIP, PIP  TTP along index DIP, PIP  + carpal tunnel compression  Sensation intact M/U/R  2+ RP    RADIOGRAPHS:  X-rays of the right elbow, personally reviewed by me, demonstrate well maintained joint space.  No fracture or dislocation.    X-rays of the right hand, personally reviewed by me, demonstrate mild degenerative changes.  No fracture or dislocation.    ASSESSMENT/PLAN:     51 year old female with right elbow lateral epicondylitis, right shoulder subacromial bursitis, bilateral CTS    Plan: Discussed with the patient at length all the different treatment options  available for her elbow including anti-inflammatories, acetaminophen, rest, ice, physical therapy, range of motion exercise,  splinting,  and occasional cortisone injections for temporary relief    - Right elbow lateral epicondylitis- discussed activity modification, rest, ice daily, bracing, and NSAIDS  - She will try taking diclofenac regularly for 7-10 days and see if this helps with her shoulder pain as well  - We can consider OT in the future  - CTS- will hold off on injection today.  States she plans to see Dr. Barraza again  - Will have her follow up in 6 weeks- can consider injections if symptoms persist  - Will also plan for x-rays of left elbow and hand- would need to have done at another facility

## 2022-04-14 ENCOUNTER — TELEPHONE (OUTPATIENT)
Dept: FAMILY MEDICINE | Facility: CLINIC | Age: 52
End: 2022-04-14
Payer: COMMERCIAL

## 2022-04-14 NOTE — TELEPHONE ENCOUNTER
Called placed to patient to follow up on her visit experience from 4/13/22. Patient expressed her concerns about healthcare and the standard of care throughout the system. Pt's 6 week follow up visit rescheduled to Dr. Valenica next month; pt verbalized understanding and acceptance of the new appointment 5/26/22 at 345pm. Pt also referred to [facility] for updated appointment date and time.

## 2022-05-26 ENCOUNTER — OFFICE VISIT (OUTPATIENT)
Dept: ORTHOPEDICS | Facility: CLINIC | Age: 52
End: 2022-05-26
Payer: COMMERCIAL

## 2022-05-26 VITALS
OXYGEN SATURATION: 98 % | SYSTOLIC BLOOD PRESSURE: 112 MMHG | BODY MASS INDEX: 36.96 KG/M2 | DIASTOLIC BLOOD PRESSURE: 60 MMHG | WEIGHT: 195.75 LBS | HEART RATE: 78 BPM | HEIGHT: 61 IN | RESPIRATION RATE: 18 BRPM

## 2022-05-26 DIAGNOSIS — G89.29 CHRONIC RIGHT SHOULDER PAIN: ICD-10-CM

## 2022-05-26 DIAGNOSIS — M77.11 LATERAL EPICONDYLITIS, RIGHT ELBOW: Primary | ICD-10-CM

## 2022-05-26 DIAGNOSIS — M77.12 LATERAL EPICONDYLITIS OF LEFT ELBOW: ICD-10-CM

## 2022-05-26 DIAGNOSIS — M25.511 CHRONIC RIGHT SHOULDER PAIN: ICD-10-CM

## 2022-05-26 PROCEDURE — 3074F PR MOST RECENT SYSTOLIC BLOOD PRESSURE < 130 MM HG: ICD-10-PCS | Mod: CPTII,S$GLB,, | Performed by: ORTHOPAEDIC SURGERY

## 2022-05-26 PROCEDURE — 1160F RVW MEDS BY RX/DR IN RCRD: CPT | Mod: CPTII,S$GLB,, | Performed by: ORTHOPAEDIC SURGERY

## 2022-05-26 PROCEDURE — 1159F MED LIST DOCD IN RCRD: CPT | Mod: CPTII,S$GLB,, | Performed by: ORTHOPAEDIC SURGERY

## 2022-05-26 PROCEDURE — 99999 PR PBB SHADOW E&M-EST. PATIENT-LVL IV: CPT | Mod: PBBFAC,,, | Performed by: ORTHOPAEDIC SURGERY

## 2022-05-26 PROCEDURE — 3078F PR MOST RECENT DIASTOLIC BLOOD PRESSURE < 80 MM HG: ICD-10-PCS | Mod: CPTII,S$GLB,, | Performed by: ORTHOPAEDIC SURGERY

## 2022-05-26 PROCEDURE — 3078F DIAST BP <80 MM HG: CPT | Mod: CPTII,S$GLB,, | Performed by: ORTHOPAEDIC SURGERY

## 2022-05-26 PROCEDURE — 3008F PR BODY MASS INDEX (BMI) DOCUMENTED: ICD-10-PCS | Mod: CPTII,S$GLB,, | Performed by: ORTHOPAEDIC SURGERY

## 2022-05-26 PROCEDURE — 3008F BODY MASS INDEX DOCD: CPT | Mod: CPTII,S$GLB,, | Performed by: ORTHOPAEDIC SURGERY

## 2022-05-26 PROCEDURE — 1160F PR REVIEW ALL MEDS BY PRESCRIBER/CLIN PHARMACIST DOCUMENTED: ICD-10-PCS | Mod: CPTII,S$GLB,, | Performed by: ORTHOPAEDIC SURGERY

## 2022-05-26 PROCEDURE — 99215 OFFICE O/P EST HI 40 MIN: CPT | Mod: S$GLB,,, | Performed by: ORTHOPAEDIC SURGERY

## 2022-05-26 PROCEDURE — 99999 PR PBB SHADOW E&M-EST. PATIENT-LVL IV: ICD-10-PCS | Mod: PBBFAC,,, | Performed by: ORTHOPAEDIC SURGERY

## 2022-05-26 PROCEDURE — 1159F PR MEDICATION LIST DOCUMENTED IN MEDICAL RECORD: ICD-10-PCS | Mod: CPTII,S$GLB,, | Performed by: ORTHOPAEDIC SURGERY

## 2022-05-26 PROCEDURE — 3074F SYST BP LT 130 MM HG: CPT | Mod: CPTII,S$GLB,, | Performed by: ORTHOPAEDIC SURGERY

## 2022-05-26 PROCEDURE — 99215 PR OFFICE/OUTPT VISIT, EST, LEVL V, 40-54 MIN: ICD-10-PCS | Mod: S$GLB,,, | Performed by: ORTHOPAEDIC SURGERY

## 2022-05-26 RX ORDER — DICLOFENAC SODIUM 75 MG/1
75 TABLET, DELAYED RELEASE ORAL 2 TIMES DAILY PRN
Qty: 60 TABLET | Refills: 2 | Status: SHIPPED | OUTPATIENT
Start: 2022-05-26 | End: 2022-10-31 | Stop reason: SDUPTHER

## 2022-05-26 NOTE — PROGRESS NOTES
Orthopedic Surgery Progress Note     Assessment: 51 y.o. female with bilateral lateral epicondylitis     I explained my diagnostic impression and the reasoning behind it in detail, using layman's terms.      Plan:   - PT referral - dry needling ok   - Continue diclofenac - refilled  - If she does one thing from the HEP, prioritize stretching of extensor wad - easy to do at breaks at her desk and does not take long  - Discussed stress management - may be exacerbating her pain   - Wrist braces during the day   - Return to clinic in 12 weeks. Return sooner if symptoms worsen or fail to improve.    All questions were answered in detail. The patient is in full agreement with the treatment plan and will proceed accordingly.    Chief Complaint   Patient presents with    Right Elbow - Pain, Numbness      Initial visit (5/26/22): Cheryl Espinosa is a 51 y.o. female who presents today complaining of Pain and Numbness of the Right Elbow     Duration of symptoms: about 5 months. She thinks she has had it on and off over the years but thought it was associated with carpal tunnel   Trauma or new activity: no, maybe she was cleaning out her house around the time that it started  Pain is constant   Is pointing to her shoulder as site of pain with radiation down the arm - this is worse at night   Pain is the worse over the lateral elbow   Aggravating factors: worse at night (wakes her up), does have daytime pain with activity. Worse if she is not moving for long periods of time   Has pain in the shoulder and elbow with reaching overhead, reaching behind her back   Relieving factors: rest   Radicular symptoms:occ neck pain  Has n/t down the entire arm into the hands. Has been treated for carpal tunnel in the past with Dr. Barraza. Has had multiple injections.   Prior treatment:  prescription NSAIDs with improvement in pain.   Has not done wrist braces. Has tried doing some stretches of the shoulders at home.  [place] gave her a  "HEP which has not been doing for the last three weeks. She recently started a new job and has been stressed about it.   Pain does interfere with sleep and activities of daily living .  Does have some pain in both arms but worse on the right     Had some issues with xrays at last visit     This is the extent of the patient's complaints at this time.     Hand dominance: Desk worker   Has ergonomic set up at work        Review of patient's allergies indicates:   Allergen Reactions    Doxycycline Anaphylaxis, Nausea And Vomiting and Other (See Comments)    Medrol [methylprednisolone] Shortness Of Breath and Rash    Nsaids (non-steroidal anti-inflammatory drug) Nausea And Vomiting     Nausea          Current Outpatient Medications:     diclofenac (VOLTAREN) 75 MG EC tablet, Take 1 tablet (75 mg total) by mouth 2 (two) times daily as needed (back pain)., Disp: 60 tablet, Rfl: 2    omeprazole (PRILOSEC) 40 MG capsule, Take 1 capsule (40 mg total) by mouth once daily., Disp: 30 capsule, Rfl: 11    VALIUM 5 mg tablet, Take 1 tablet (5 mg total) by mouth every 12 (twelve) hours as needed (back pain)., Disp: 60 tablet, Rfl: 1    Physical Exam:   Vitals:    05/26/22 1541   BP: 112/60   Pulse: 78   Resp: 18   SpO2: 98%   Weight: 88.8 kg (195 lb 12.3 oz)   Height: 5' 1" (1.549 m)   PainSc:   5   PainLoc: Elbow     General: Weight: 88.8 kg (195 lb 12.3 oz) Body mass index is 36.99 kg/m².  Patient is alert, awake and oriented to time, place and person. Mood and affect are appropriate.  Patient does not appear to be in any distress, denies any constitutional symptoms and appears stated age.   HEENT:  Pupils are equal and round, sclera are not injected. External examination of ears and nose reveals no abnormalities. Cranial nerves II-X are grossly intact  Skin: no rashes, abrasions or open wounds on the affected extremity   Resp:  No respiratory distress or audible wheezing   CV: 2+  pulses, all extremities warm and well " perfused   Left and Right Upper extremity    Minimal pain with shoulder testing - full painless ROM   Good strength with cuff testing    Pain over ECRB bilaterally, much more tender on R than L   Pain with resisted wrist and digit extension   Does report numbness on R with tinels over PIN but not in radial n dist - reports numbness in median n dist   No numbness/tingling with tinels over PIN on L   LTSI m/u/r  2+ RP  + EPL, IO, FDS, FDP      Imaging: 3 views right/left elbows and right hand negative for degenerative changes, fracture     I personally reviewed and interpreted the patient's imaging obtained today in clinic      This note was created by combination of typed  and M-Modal dictation. Transcription and phonetic errors may be present.  If there are any questions, please contact me.    Past Medical History:   Diagnosis Date    Carpal tunnel syndrome     GERD (gastroesophageal reflux disease)     History of degenerative disc disease     Metatarsalgia        Active Problem List with Overview Notes    Diagnosis Date Noted    Chronic pain of right knee 10/06/2021    Chronic bilateral low back pain with bilateral sciatica 08/12/2021    Decreased range of motion of intervertebral discs of lumbar spine 08/12/2021    Weakness of both lower extremities 08/12/2021    Posture imbalance 08/12/2021    Osteopenia 08/05/2016     From depo-provera.  Needs DEXA scan every 3 years.       History of endometriosis 08/05/2016     See Dr. Herring to complete colonoscopy as she had extensive endometriosis that involved her colon     Located in uterus, ovaries, colon.       GERD without esophagitis 08/05/2016       History reviewed. No pertinent surgical history.    Family History   Problem Relation Age of Onset    Breast cancer Maternal Aunt     Breast cancer Maternal Grandmother

## 2022-05-31 ENCOUNTER — PATIENT MESSAGE (OUTPATIENT)
Dept: FAMILY MEDICINE | Facility: CLINIC | Age: 52
End: 2022-05-31
Payer: COMMERCIAL

## 2022-06-08 ENCOUNTER — OFFICE VISIT (OUTPATIENT)
Dept: URGENT CARE | Facility: CLINIC | Age: 52
End: 2022-06-08
Payer: COMMERCIAL

## 2022-06-08 VITALS
OXYGEN SATURATION: 95 % | TEMPERATURE: 98 F | HEART RATE: 100 BPM | RESPIRATION RATE: 18 BRPM | SYSTOLIC BLOOD PRESSURE: 115 MMHG | DIASTOLIC BLOOD PRESSURE: 74 MMHG | HEIGHT: 61 IN | BODY MASS INDEX: 35.87 KG/M2 | WEIGHT: 190 LBS

## 2022-06-08 DIAGNOSIS — R05.9 COUGH: ICD-10-CM

## 2022-06-08 DIAGNOSIS — Z87.898 HISTORY OF PALPITATIONS: ICD-10-CM

## 2022-06-08 DIAGNOSIS — U07.1 COVID-19 VIRUS INFECTION: Primary | ICD-10-CM

## 2022-06-08 DIAGNOSIS — Z20.822 ENCOUNTER FOR LABORATORY TESTING FOR COVID-19 VIRUS: ICD-10-CM

## 2022-06-08 PROCEDURE — 93005 EKG 12-LEAD: ICD-10-PCS | Mod: S$GLB,,, | Performed by: NURSE PRACTITIONER

## 2022-06-08 PROCEDURE — 1160F RVW MEDS BY RX/DR IN RCRD: CPT | Mod: CPTII,S$GLB,, | Performed by: NURSE PRACTITIONER

## 2022-06-08 PROCEDURE — 3008F PR BODY MASS INDEX (BMI) DOCUMENTED: ICD-10-PCS | Mod: CPTII,S$GLB,, | Performed by: NURSE PRACTITIONER

## 2022-06-08 PROCEDURE — 3008F BODY MASS INDEX DOCD: CPT | Mod: CPTII,S$GLB,, | Performed by: NURSE PRACTITIONER

## 2022-06-08 PROCEDURE — 3074F PR MOST RECENT SYSTOLIC BLOOD PRESSURE < 130 MM HG: ICD-10-PCS | Mod: CPTII,S$GLB,, | Performed by: NURSE PRACTITIONER

## 2022-06-08 PROCEDURE — 1160F PR REVIEW ALL MEDS BY PRESCRIBER/CLIN PHARMACIST DOCUMENTED: ICD-10-PCS | Mod: CPTII,S$GLB,, | Performed by: NURSE PRACTITIONER

## 2022-06-08 PROCEDURE — 1159F MED LIST DOCD IN RCRD: CPT | Mod: CPTII,S$GLB,, | Performed by: NURSE PRACTITIONER

## 2022-06-08 PROCEDURE — 93005 ELECTROCARDIOGRAM TRACING: CPT | Mod: S$GLB,,, | Performed by: NURSE PRACTITIONER

## 2022-06-08 PROCEDURE — 1159F PR MEDICATION LIST DOCUMENTED IN MEDICAL RECORD: ICD-10-PCS | Mod: CPTII,S$GLB,, | Performed by: NURSE PRACTITIONER

## 2022-06-08 PROCEDURE — 3074F SYST BP LT 130 MM HG: CPT | Mod: CPTII,S$GLB,, | Performed by: NURSE PRACTITIONER

## 2022-06-08 PROCEDURE — 93010 ELECTROCARDIOGRAM REPORT: CPT | Mod: S$GLB,,, | Performed by: INTERNAL MEDICINE

## 2022-06-08 PROCEDURE — 99213 PR OFFICE/OUTPT VISIT, EST, LEVL III, 20-29 MIN: ICD-10-PCS | Mod: S$GLB,,, | Performed by: NURSE PRACTITIONER

## 2022-06-08 PROCEDURE — U0003 INFECTIOUS AGENT DETECTION BY NUCLEIC ACID (DNA OR RNA); SEVERE ACUTE RESPIRATORY SYNDROME CORONAVIRUS 2 (SARS-COV-2) (CORONAVIRUS DISEASE [COVID-19]), AMPLIFIED PROBE TECHNIQUE, MAKING USE OF HIGH THROUGHPUT TECHNOLOGIES AS DESCRIBED BY CMS-2020-01-R: HCPCS | Performed by: PHYSICIAN ASSISTANT

## 2022-06-08 PROCEDURE — 3078F PR MOST RECENT DIASTOLIC BLOOD PRESSURE < 80 MM HG: ICD-10-PCS | Mod: CPTII,S$GLB,, | Performed by: NURSE PRACTITIONER

## 2022-06-08 PROCEDURE — U0005 INFEC AGEN DETEC AMPLI PROBE: HCPCS | Performed by: PHYSICIAN ASSISTANT

## 2022-06-08 PROCEDURE — 93010 EKG 12-LEAD: ICD-10-PCS | Mod: S$GLB,,, | Performed by: INTERNAL MEDICINE

## 2022-06-08 PROCEDURE — 3078F DIAST BP <80 MM HG: CPT | Mod: CPTII,S$GLB,, | Performed by: NURSE PRACTITIONER

## 2022-06-08 PROCEDURE — 99213 OFFICE O/P EST LOW 20 MIN: CPT | Mod: S$GLB,,, | Performed by: NURSE PRACTITIONER

## 2022-06-08 NOTE — PATIENT INSTRUCTIONS
You have tested positive for COVID-19 today.         ISOLATION    If you tested positive and you have no symptoms, you must isolate for 5 days starting on the day of the positive test. I       If you tested positive and have symptoms, you must isolate for 5 days starting on the day of the first symptoms,  not the day of the positive test.       This is the most important part, both the CDC and the [facility] emphasize that you do not test out of isolation.       After 5 days, if your symptoms have improved and you have not had fever on day 5, you can return to the community on day 6- NO TESTING REQUIRED!        In fact, we do not retest if you were positive in the last 90 days.       After your 5 days of isolation are completed, the CDC recommends strict mask use for the first 5 days that you come out of isolation.      Prevention steps for patients with confirmed COVID-19    Stay home and stay away from family members and friends. The CDC says, you can leave home after these three things have happened: 1) You have had no fever for at least 72 hours (that is three full days of no fever without the use of medicine that reduces fevers) 2) AND other symptoms have improved (for example, when your cough or shortness of breath have improved) 3) AND at least 5 days have passed since your symptoms first appeared.    Separate yourself from other people and animals in your home.    Call ahead before visiting your doctor.    Wear a facemask.    Cover your coughs and sneezes.    Wash your hands often with soap and water; hand  can be used, too.    Avoid sharing personal household items.    Wipe down surfaces used daily.    Monitor your symptoms. Seek prompt medical attention if your illness is worsening (e.g., difficulty breathing).    Before seeking care, call your healthcare provider.    If you have a medical emergency and need to call 911, notify the dispatch personnel that you have, or are being evaluated for  COVID-19. If possible, put on a facemask before emergency medical services arrive.      Contact Tracing    As one of the next steps, you will receive a call or text from the [place] Department of Health ([facility]) COVID-19 Tracing Team. See the contact information below so you know not to ignore the health departments call. It is important that you contact them back immediately so they can help.      Contact Tracer Number:  [phone]  Caller ID for most carriers: [place] Dept Health     What is contact tracing?  Contact tracing is a process that helps identify everyone who has been in close contact with an infected person. Contact tracers let those people know they may have been exposed and guide them on next steps. Confidentiality is important for everyone; no one will be told who may have exposed them to the virus.  Your involvement is important. The more we know about where and how this virus is spreading, the better chance we have at stopping it from spreading further.  What does exposure mean?  Exposure means you have been within 6 feet for more than 15 minutes with a person who has or had COVID-19.  What kind of questions do the contact tracers ask?  A contact tracer will confirm your basic contact information including name, address, phone number, and next of kin, as well as asking about any symptoms you may have had. Theyll also ask you how you think you may have gotten sick, such as places where you may have been exposed to the virus, and people you were with. Those names will never be shared with anyone outside of that call, and will only be used to help trace and stop the spread of the virus.   I have privacy concerns. How will the state use my information?  Your privacy about your health is important. All calls are completed using call centers that use the appropriate health privacy protection measures (HIPAA compliance), meaning that your patient information is safe. No one will ever ask you any  questions related to immigration status. Your health comes first.   Do I have to participate?  You do not have to participate, but we strongly encourage you to. Contact tracing can help us catch and control new outbreaks as theyre developing to keep your friends and family safe.   What if I dont hear from anyone?  If you dont receive a call within 24 hours, you can call the number above right away to inquire about your status. That line is open from 8:00 am - 8:00 p.m., 7 days a week.  Contact tracing saves lives! Together, we have the power to beat this virus and keep our loved ones and neighbors safe.    For more information see CDC link below.      https://www.cdc.gov/coronavirus/2019-ncov/hcp/guidance-prevent-spread.html#precautions        Sources:  [facility], [facility]       Sincerely,     Ximena Bolden NP                                           Viral Syndrome  If your condition worsens or fails to improve we recommend that you receive another evaluation at the ER immediately or contact your PCP to discuss your concerns or return here. You must understand that you've received an urgent care treatment only and that you may be released before all your medical problems are known or treated. You the patient will arrange for follouwp care as instructed.   We discussed that your illness is viral in nature so you will treat this symptomatically.    Claritin or Zyrtec for allergy symptoms  Flonase for Nasal congestion and allergy symptoms   Tylenol or Motrin for fever, pain or sore throat  Rest and fluids are important       General Referral to [facility]   You were referred to [facility] for follow-up care on your history of palpitations.    Please call [phone] to schedule your appointment.    Please go to the Emergency Department for any concerns or worsening of condition.  Please follow up with your primary care doctor or specialist in the next  48-72hrs as needed.    If you  smoke, please stop smoking.

## 2022-06-08 NOTE — PROGRESS NOTES
"Subjective:       Patient ID: Cheryl Espinosa is a 51 y.o. female.    Vitals:  height is 5' 1" (1.549 m) and weight is 86.2 kg (190 lb). Her temperature is 98.3 °F (36.8 °C). Her blood pressure is 115/74 and her pulse is 100. Her respiration is 18 and oxygen saturation is 95%.     Chief Complaint: URI    Pt states that she is having sore throat , fatigue, body aches and nausea that started on  5/31. Pt states that she was exposed and took home test which shows positive. Pt is taking tylenol and clartin.        URI   This is a new problem. The current episode started in the past 7 days. The problem has been unchanged. There has been no fever. Associated symptoms include coughing, nausea and a sore throat. Pertinent negatives include no abdominal pain, chest pain, congestion, diarrhea, ear pain, headaches, neck pain, sinus pain, vomiting or wheezing. She has tried acetaminophen and antihistamine for the symptoms. The treatment provided mild relief.       Constitution: Positive for fatigue and fever. Negative for chills, sweating and generalized weakness.   HENT: Positive for sore throat. Negative for ear pain, ear discharge, congestion, postnasal drip, sinus pain, sinus pressure and voice change.    Neck: Negative for neck pain and neck stiffness.   Cardiovascular: Positive for palpitations. Negative for chest pain and sob on exertion.   Respiratory: Positive for cough. Negative for chest tightness, sputum production, shortness of breath and wheezing.    Gastrointestinal: Positive for nausea. Negative for abdominal pain, vomiting and diarrhea.   Musculoskeletal: Negative for muscle ache.   Neurological: Negative for dizziness, light-headedness and headaches.   Psychiatric/Behavioral: Positive for nervous/anxious (Reports recent stress of new job). The patient is nervous/anxious (Reports recent stress of new job).        Denies any known exposure to anyone with similar symptoms or positive for COVID-19 " virus  Objective:      Physical Exam   Constitutional: She is oriented to person, place, and time. She appears well-developed. She is cooperative.  Non-toxic appearance. She does not appear ill. No distress.   HENT:   Head: Normocephalic and atraumatic.   Ears:   Right Ear: Hearing and external ear normal.   Left Ear: Hearing and external ear normal.   Nose: Nose normal. No mucosal edema, rhinorrhea or nasal deformity. No epistaxis. Right sinus exhibits no maxillary sinus tenderness and no frontal sinus tenderness. Left sinus exhibits no maxillary sinus tenderness and no frontal sinus tenderness.   Mouth/Throat: Uvula is midline, oropharynx is clear and moist and mucous membranes are normal. Mucous membranes are moist. No trismus in the jaw. Normal dentition. No uvula swelling. No oropharyngeal exudate, posterior oropharyngeal edema or posterior oropharyngeal erythema. Oropharynx is clear.   Eyes: Conjunctivae and lids are normal. Pupils are equal, round, and reactive to light. No scleral icterus. Extraocular movement intact   Neck: Trachea normal and phonation normal. Neck supple. No edema present. No erythema present. No neck rigidity present.   Cardiovascular: Normal rate, regular rhythm, S1 normal, S2 normal, normal heart sounds and normal pulses. Exam reveals no decreased pulses.   Pulmonary/Chest: Effort normal and breath sounds normal. No accessory muscle usage. No respiratory distress. She has no decreased breath sounds. She has no wheezes. She has no rhonchi. She has no rales.   Abdominal: Normal appearance.   Musculoskeletal: Normal range of motion.         General: No deformity. Normal range of motion.   Lymphadenopathy:     She has no cervical adenopathy.   Neurological: She is alert and oriented to person, place, and time. She exhibits normal muscle tone. Coordination normal.   Skin: Skin is warm, dry, intact, not diaphoretic and not pale.   Psychiatric: Her speech is normal and behavior is normal.  Judgment and thought content normal.   Nursing note and vitals reviewed.        The EKG shows a normal, regular Sinus Rhythm, at a rate of 82, there are not ST Changes. There is not a previous EKG for comparison. This EKG was interpreted by me   Assessment:       1. COVID-19 virus infection    2. Encounter for laboratory testing for COVID-19 virus    3. History of palpitations    4. Cough          Plan:       Reviewed Normal EKG with patient who acknowledged results.     Patient informed that her symptoms are indicative of a viral illness which is treated symptomatically.  We discussed over the counter treatment for this condition. Patient educational handouts also included in discharge paperwork and given to patient who verbalized understanding and agrees with plan of care.  She denies any further questions or concerns at this time.  Patient exits exam room in no acute distress.    COVID-19 virus infection    Encounter for laboratory testing for COVID-19 virus  -     COVID-19 Routine Screening    History of palpitations  -     EKG 12-lead  -     Ambulatory referral/consult to Internal Medicine    Cough       Patient Instructions          You have tested positive for COVID-19 today.         ISOLATION    If you tested positive and you have no symptoms, you must isolate for 5 days starting on the day of the positive test. I       If you tested positive and have symptoms, you must isolate for 5 days starting on the day of the first symptoms,  not the day of the positive test.       This is the most important part, both the CDC and the [facility] emphasize that you do not test out of isolation.       After 5 days, if your symptoms have improved and you have not had fever on day 5, you can return to the community on day 6- NO TESTING REQUIRED!        In fact, we do not retest if you were positive in the last 90 days.       After your 5 days of isolation are completed, the CDC recommends strict mask use for the first 5 days that  you come out of isolation.      Prevention steps for patients with confirmed COVID-19    Stay home and stay away from family members and friends. The CDC says, you can leave home after these three things have happened: 1) You have had no fever for at least 72 hours (that is three full days of no fever without the use of medicine that reduces fevers) 2) AND other symptoms have improved (for example, when your cough or shortness of breath have improved) 3) AND at least 5 days have passed since your symptoms first appeared.    Separate yourself from other people and animals in your home.    Call ahead before visiting your doctor.    Wear a facemask.    Cover your coughs and sneezes.    Wash your hands often with soap and water; hand  can be used, too.    Avoid sharing personal household items.    Wipe down surfaces used daily.    Monitor your symptoms. Seek prompt medical attention if your illness is worsening (e.g., difficulty breathing).    Before seeking care, call your healthcare provider.    If you have a medical emergency and need to call 911, notify the dispatch personnel that you have, or are being evaluated for COVID-19. If possible, put on a facemask before emergency medical services arrive.      Contact Tracing    As one of the next steps, you will receive a call or text from the [place] Department of Health ([facility]) COVID-19 Tracing Team. See the contact information below so you know not to ignore the health departments call. It is important that you contact them back immediately so they can help.      Contact Tracer Number:  [phone]  Caller ID for most carriers: [place] Dept Health     What is contact tracing?  · Contact tracing is a process that helps identify everyone who has been in close contact with an infected person. Contact tracers let those people know they may have been exposed and guide them on next steps. Confidentiality is important for everyone; no one will be told who may have  exposed them to the virus.  · Your involvement is important. The more we know about where and how this virus is spreading, the better chance we have at stopping it from spreading further.  What does exposure mean?  · Exposure means you have been within 6 feet for more than 15 minutes with a person who has or had COVID-19.  What kind of questions do the contact tracers ask?  · A contact tracer will confirm your basic contact information including name, address, phone number, and next of kin, as well as asking about any symptoms you may have had. Theyll also ask you how you think you may have gotten sick, such as places where you may have been exposed to the virus, and people you were with. Those names will never be shared with anyone outside of that call, and will only be used to help trace and stop the spread of the virus.   I have privacy concerns. How will the state use my information?  · Your privacy about your health is important. All calls are completed using call centers that use the appropriate health privacy protection measures (HIPAA compliance), meaning that your patient information is safe. No one will ever ask you any questions related to immigration status. Your health comes first.   Do I have to participate?  · You do not have to participate, but we strongly encourage you to. Contact tracing can help us catch and control new outbreaks as theyre developing to keep your friends and family safe.   What if I dont hear from anyone?  · If you dont receive a call within 24 hours, you can call the number above right away to inquire about your status. That line is open from 8:00 am - 8:00 p.m., 7 days a week.  Contact tracing saves lives! Together, we have the power to beat this virus and keep our loved ones and neighbors safe.    For more information see CDC link below.      https://www.cdc.gov/coronavirus/2019-ncov/hcp/guidance-prevent-spread.html#precautions        Sources:  [facility], [facility]  [facility]       Sincerely,     Ximena Bolden, RETA                                           Viral Syndrome  If your condition worsens or fails to improve we recommend that you receive another evaluation at the ER immediately or contact your PCP to discuss your concerns or return here. You must understand that you've received an urgent care treatment only and that you may be released before all your medical problems are known or treated. You the patient will arrange for follouwp care as instructed.   We discussed that your illness is viral in nature so you will treat this symptomatically.    Claritin or Zyrtec for allergy symptoms  Flonase for Nasal congestion and allergy symptoms   Tylenol or Motrin for fever, pain or sore throat  Rest and fluids are important       General Referral to [facility]   You were referred to [facility] for follow-up care on your history of palpitations.    Please call [phone] to schedule your appointment.    Please go to the Emergency Department for any concerns or worsening of condition.  Please follow up with your primary care doctor or specialist in the next 48-72hrs as needed.    If you  smoke, please stop smoking.

## 2022-06-09 LAB
SARS-COV-2 RNA RESP QL NAA+PROBE: DETECTED
SARS-COV-2- CYCLE NUMBER: 24

## 2022-06-24 ENCOUNTER — OFFICE VISIT (OUTPATIENT)
Dept: FAMILY MEDICINE | Facility: CLINIC | Age: 52
End: 2022-06-24
Payer: COMMERCIAL

## 2022-06-24 VITALS
OXYGEN SATURATION: 96 % | HEART RATE: 97 BPM | HEIGHT: 61 IN | DIASTOLIC BLOOD PRESSURE: 62 MMHG | BODY MASS INDEX: 36.42 KG/M2 | SYSTOLIC BLOOD PRESSURE: 102 MMHG | WEIGHT: 192.88 LBS | TEMPERATURE: 98 F

## 2022-06-24 DIAGNOSIS — Z00.00 ANNUAL PHYSICAL EXAM: Primary | ICD-10-CM

## 2022-06-24 DIAGNOSIS — Z12.31 SCREENING MAMMOGRAM FOR BREAST CANCER: ICD-10-CM

## 2022-06-24 PROCEDURE — 1159F PR MEDICATION LIST DOCUMENTED IN MEDICAL RECORD: ICD-10-PCS | Mod: CPTII,S$GLB,, | Performed by: FAMILY MEDICINE

## 2022-06-24 PROCEDURE — 99999 PR PBB SHADOW E&M-EST. PATIENT-LVL III: CPT | Mod: PBBFAC,,, | Performed by: FAMILY MEDICINE

## 2022-06-24 PROCEDURE — 99396 PR PREVENTIVE VISIT,EST,40-64: ICD-10-PCS | Mod: S$GLB,,, | Performed by: FAMILY MEDICINE

## 2022-06-24 PROCEDURE — 3008F BODY MASS INDEX DOCD: CPT | Mod: CPTII,S$GLB,, | Performed by: FAMILY MEDICINE

## 2022-06-24 PROCEDURE — 1160F PR REVIEW ALL MEDS BY PRESCRIBER/CLIN PHARMACIST DOCUMENTED: ICD-10-PCS | Mod: CPTII,S$GLB,, | Performed by: FAMILY MEDICINE

## 2022-06-24 PROCEDURE — 3008F PR BODY MASS INDEX (BMI) DOCUMENTED: ICD-10-PCS | Mod: CPTII,S$GLB,, | Performed by: FAMILY MEDICINE

## 2022-06-24 PROCEDURE — 3078F PR MOST RECENT DIASTOLIC BLOOD PRESSURE < 80 MM HG: ICD-10-PCS | Mod: CPTII,S$GLB,, | Performed by: FAMILY MEDICINE

## 2022-06-24 PROCEDURE — 3074F PR MOST RECENT SYSTOLIC BLOOD PRESSURE < 130 MM HG: ICD-10-PCS | Mod: CPTII,S$GLB,, | Performed by: FAMILY MEDICINE

## 2022-06-24 PROCEDURE — 3074F SYST BP LT 130 MM HG: CPT | Mod: CPTII,S$GLB,, | Performed by: FAMILY MEDICINE

## 2022-06-24 PROCEDURE — 99999 PR PBB SHADOW E&M-EST. PATIENT-LVL III: ICD-10-PCS | Mod: PBBFAC,,, | Performed by: FAMILY MEDICINE

## 2022-06-24 PROCEDURE — 3078F DIAST BP <80 MM HG: CPT | Mod: CPTII,S$GLB,, | Performed by: FAMILY MEDICINE

## 2022-06-24 PROCEDURE — 99396 PREV VISIT EST AGE 40-64: CPT | Mod: S$GLB,,, | Performed by: FAMILY MEDICINE

## 2022-06-24 PROCEDURE — 1160F RVW MEDS BY RX/DR IN RCRD: CPT | Mod: CPTII,S$GLB,, | Performed by: FAMILY MEDICINE

## 2022-06-24 PROCEDURE — 1159F MED LIST DOCD IN RCRD: CPT | Mod: CPTII,S$GLB,, | Performed by: FAMILY MEDICINE

## 2022-06-24 NOTE — PROGRESS NOTES
Assessment & Plan  Problem List Items Addressed This Visit    None     Visit Diagnoses     Annual physical exam    -  Primary    Relevant Orders    Comprehensive Metabolic Panel    CBC Auto Differential    Hemoglobin A1C    Lipid Panel    TSH    Screening mammogram for breast cancer        Relevant Orders    Mammo Digital Screening Bilat      I addressed all major concerns as it related to health maintenance.  All were ordered and scheduled based on the patients wishes.  Any additional health maintenance will be readdressed at the next physical if declined or deferred by the patient.        Health Maintenance reviewed    Follow-up: No follow-ups on file.    ______________________________________________________________________    Chief Complaint  Chief Complaint   Patient presents with    Palpitations       HPI  Cheryl Espinosa is a 51 y.o. female with multiple medical diagnoses as listed in the medical history and problem list that presents for annual exam.  Pt is known to me with last appointment 6/23/2021.    Patient denies any new symptoms including chest pain, SOB, blurry vision, N/V, diarrhea.        PAST MEDICAL HISTORY:  Past Medical History:   Diagnosis Date    Carpal tunnel syndrome     GERD (gastroesophageal reflux disease)     History of degenerative disc disease     Metatarsalgia        PAST SURGICAL HISTORY:  History reviewed. No pertinent surgical history.    SOCIAL HISTORY:  Social History     Socioeconomic History    Marital status: Single   Tobacco Use    Smoking status: Never Smoker    Smokeless tobacco: Never Used   Substance and Sexual Activity    Alcohol use: Yes    Drug use: No       FAMILY HISTORY:  Family History   Problem Relation Age of Onset    Breast cancer Maternal Aunt     Breast cancer Maternal Grandmother        ALLERGIES AND MEDICATIONS: updated and reviewed.  Review of patient's allergies indicates:   Allergen Reactions    Doxycycline Anaphylaxis, Nausea And  "Vomiting and Other (See Comments)    Medrol [methylprednisolone] Shortness Of Breath and Rash    Nsaids (non-steroidal anti-inflammatory drug) Nausea And Vomiting     Nausea      Current Outpatient Medications   Medication Sig Dispense Refill    diclofenac (VOLTAREN) 75 MG EC tablet Take 1 tablet (75 mg total) by mouth 2 (two) times daily as needed (back pain). 60 tablet 2    omeprazole (PRILOSEC) 40 MG capsule Take 1 capsule (40 mg total) by mouth once daily. 30 capsule 11    VALIUM 5 mg tablet Take 1 tablet (5 mg total) by mouth every 12 (twelve) hours as needed (back pain). 60 tablet 1     No current facility-administered medications for this visit.         ROS  Review of Systems   Constitutional: Negative for activity change, appetite change, fatigue, fever and unexpected weight change.   HENT: Negative.  Negative for ear discharge, ear pain, rhinorrhea and sore throat.    Eyes: Negative.    Respiratory: Negative for apnea, cough, chest tightness, shortness of breath and wheezing.    Cardiovascular: Negative for chest pain, palpitations and leg swelling.   Gastrointestinal: Negative for abdominal distention, abdominal pain, constipation, diarrhea and vomiting.   Endocrine: Negative for cold intolerance, heat intolerance, polydipsia and polyuria.   Genitourinary: Negative for decreased urine volume and urgency.   Musculoskeletal: Negative.    Skin: Negative for rash.   Neurological: Negative for dizziness and headaches.   Hematological: Does not bruise/bleed easily.   Psychiatric/Behavioral: Negative for agitation, sleep disturbance and suicidal ideas.           Physical Exam  Vitals:    06/24/22 1423   BP: 102/62   Pulse: 97   Temp: 97.9 °F (36.6 °C)   TempSrc: Oral   SpO2: 96%   Weight: 87.5 kg (192 lb 14.4 oz)   Height: 5' 1" (1.549 m)    Body mass index is 36.45 kg/m².  Weight: 87.5 kg (192 lb 14.4 oz)   Height: 5' 1" (154.9 cm)   Physical Exam  Vitals reviewed.   Constitutional:       Appearance: She is " well-developed.   HENT:      Head: Normocephalic and atraumatic.      Right Ear: External ear normal.      Left Ear: External ear normal.      Nose: Nose normal.   Eyes:      Conjunctiva/sclera: Conjunctivae normal.      Pupils: Pupils are equal, round, and reactive to light.   Cardiovascular:      Rate and Rhythm: Normal rate and regular rhythm.      Heart sounds: Normal heart sounds.   Pulmonary:      Effort: Pulmonary effort is normal.      Breath sounds: Normal breath sounds.   Skin:     General: Skin is warm and dry.   Neurological:      Mental Status: She is alert and oriented to person, place, and time.           Health Maintenance       Date Due Completion Date    Hepatitis C Screening Never done ---    Colorectal Cancer Screening Never done ---    Mammogram 06/25/2022 6/25/2021    Influenza Vaccine (Season Ended) 09/01/2022 ---    Cervical Cancer Screening 06/23/2025 6/23/2020    Lipid Panel 06/23/2026 6/23/2021    TETANUS VACCINE 11/18/2029 11/18/2019              Patient note was created using [employer].  Any errors in syntax or even information may not have been identified and edited on initial review prior to signing this note.

## 2022-06-29 ENCOUNTER — HOSPITAL ENCOUNTER (OUTPATIENT)
Dept: RADIOLOGY | Facility: HOSPITAL | Age: 52
Discharge: HOME | End: 2022-06-29
Attending: FAMILY MEDICINE
Payer: COMMERCIAL

## 2022-06-29 DIAGNOSIS — Z12.31 SCREENING MAMMOGRAM FOR BREAST CANCER: ICD-10-CM

## 2022-06-29 PROCEDURE — 77063 MAMMO DIGITAL SCREENING BILAT WITH TOMO: ICD-10-PCS | Mod: 26,,, | Performed by: RADIOLOGY

## 2022-06-29 PROCEDURE — 77067 MAMMO DIGITAL SCREENING BILAT WITH TOMO: ICD-10-PCS | Mod: 26,,, | Performed by: RADIOLOGY

## 2022-06-29 PROCEDURE — 77063 BREAST TOMOSYNTHESIS BI: CPT | Mod: TC,PO

## 2022-06-29 PROCEDURE — 77063 BREAST TOMOSYNTHESIS BI: CPT | Mod: 26,,, | Performed by: RADIOLOGY

## 2022-06-29 PROCEDURE — 77067 SCR MAMMO BI INCL CAD: CPT | Mod: 26,,, | Performed by: RADIOLOGY

## 2022-06-29 PROCEDURE — 77067 SCR MAMMO BI INCL CAD: CPT | Mod: TC,PO

## 2022-07-01 ENCOUNTER — CLINICAL SUPPORT (OUTPATIENT)
Dept: REHABILITATION | Facility: HOSPITAL | Age: 52
End: 2022-07-01
Attending: ORTHOPAEDIC SURGERY
Payer: COMMERCIAL

## 2022-07-01 DIAGNOSIS — R53.1 DECREASED STRENGTH: ICD-10-CM

## 2022-07-01 DIAGNOSIS — M25.522 PAIN OF BOTH ELBOWS: ICD-10-CM

## 2022-07-01 DIAGNOSIS — M25.511 CHRONIC RIGHT SHOULDER PAIN: ICD-10-CM

## 2022-07-01 DIAGNOSIS — G89.29 CHRONIC RIGHT SHOULDER PAIN: ICD-10-CM

## 2022-07-01 DIAGNOSIS — M79.10 TRIGGER POINT: ICD-10-CM

## 2022-07-01 DIAGNOSIS — M77.12 LATERAL EPICONDYLITIS OF LEFT ELBOW: ICD-10-CM

## 2022-07-01 DIAGNOSIS — M25.521 PAIN OF BOTH ELBOWS: ICD-10-CM

## 2022-07-01 DIAGNOSIS — M77.11 LATERAL EPICONDYLITIS, RIGHT ELBOW: ICD-10-CM

## 2022-07-01 PROCEDURE — 97161 PT EVAL LOW COMPLEX 20 MIN: CPT | Mod: PN

## 2022-07-01 PROCEDURE — 97110 THERAPEUTIC EXERCISES: CPT | Mod: PN

## 2022-07-01 NOTE — PLAN OF CARE
[facility]  Physical Therapy Initial Evaluation    Name: Cheryl Espinosa  Clinic Number: [id number]    Therapy Diagnosis:   Encounter Diagnoses   Name Primary?    Lateral epicondylitis of left elbow     Lateral epicondylitis, right elbow     Chronic right shoulder pain     Pain of both elbows     Decreased strength     Trigger point      Physician: Jayashree Valencia MD    Physician Orders: PT Eval and Treat   Medical Diagnosis from Referral:   M77.12 (ICD-10-CM) - Lateral epicondylitis of left elbow   M77.11 (ICD-10-CM) - Lateral epicondylitis, right elbow   M25.511,G89.29 (ICD-10-CM) - Chronic right shoulder pain     Evaluation Date: 7/1/2022  Authorization Period Expiration: 12/31/2022  Plan of Care Expiration: 7/1/2022 to 8/26/2022  Visit # / Visits authorized: 1/ 1  FOTO#:1/5    Time In: 8:00am   Time Out: 9:00am  Total Billable Time: 60 minutes (1LCE, 1TE)    Precautions: Standard    Subjective     Date of onset: off and on for a while, worst a few months ago    History of current condition - Cheryl reports: Pt reports that she has bilateral elbow pain. right is worse than left. Pt reports that she has lateral elbow pain and was provided with a cock up brace for her elbow. Pt states that the pain is prevalent along the lateral muscles of the forearm. She will massage it and will get a little bit of relief.She will do some of the stretches provided included wrist flexion but it bothers her wrists and she stats she has bilateral carpal tunnel and wears braces for that at night. She will sometimes have some pain going up the arm when she pushes. Pt takes diclofenac PRN and valium PRN for her back pain. Pt does have numbness and tingling in her right hand mostly and usually feels like her right entire hand will go numb when she lays on there right shoulder. She is unsure what fingers she will have numbness and tingling and is unsure if it is the back or the front of the  hand. She further reports some swelling in the hands. She will occasionally rub her thumb. Pt will occasionally feel a pull or sharp pain when she reaches her right hand overhead or out to the side.      Medical History:   Past Medical History:   Diagnosis Date    Carpal tunnel syndrome     GERD (gastroesophageal reflux disease)     History of degenerative disc disease     Metatarsalgia        Surgical History:   Cheryl Espinosa  has no past surgical history on file.    Medications:   Cheryl has a current medication list which includes the following prescription(s): diclofenac, omeprazole, and valium.    Allergies:   Review of patient's allergies indicates:   Allergen Reactions    Doxycycline Anaphylaxis, Nausea And Vomiting and Other (See Comments)    Medrol [methylprednisolone] Shortness Of Breath and Rash    Nsaids (non-steroidal anti-inflammatory drug) Nausea And Vomiting     Nausea          Imaging, xray: FINDINGS: No images for the left.   Bone mineralization is normal.  Right elbow: No acute fracture or dislocation. Alignment is normal. Joint spaces are preserved. There is no elbow joint effusion.  Right hand: There is no acute fracture or dislocation. Alignment is normal. Joint spaces are preserved. There are no erosive changes.     Impression:  No acute osseous abnormality of the right elbow or hand.    Prior Therapy: Pt denies PT for her arms before. She has had PT for back and knees. She has braces currently for wrist. She has had cortisone in the wrists which seemed to help.   Social History: lives alone  Occupation: Just quit her most recent job.   Prior Level of Function: independent  Current Level of Function: independent     Pain:    Right: Current 5/10, worst 10/10, best 2/10   Left: Current 2/10, worst 4/10, best 2/10   Location: bilateral elbows   Description: Superficial and Sharp  Aggravating Factors: moving the arm.   Easing Factors: massage, pain medication and heating pad,  alternative with cold sometimes.     Pts goals: Pt would like to figure out what she can do on her own to give her relief.     Objective     Posture: Pt sits c rounded shoulders   Palpation: TTP along lateral elbow right>L, along common extensor tendon right, Supinator.   Sensation: intact  DTRs: 2+ all bilateral UE  Myotomes: no deficits  Dermatomes: no dermatomal pattern found.     Range of Motion/Strength:     CERVICAL AROM Pain/Dysfunction with Movement   Flexion WFL    Extension WFL Slight pain isolated to neck at endrange    Right side bending WFL Slight pull in the neck   Left side bending wfl Slight pull in the neck   Right rotation WFL Slight pull in the neck   Left rotation WFL Slight pull in the neck     Shoulder Right Left Pain/Dysfunction with Movement   AROM/PROM      flexion WNL WNL Slight elbow pain at end range flexion   extension WNL WNL    abduction WNL WNL Slight elbow pain at end range abd   adduction WNL WNL    Internal rotation WNL WNL    ER at 90° abd WNL WNL    ER at 0° abd WNL WNL      Elbow Right Left Pain/Dysfunction with Movement   AROM/PROM      flexion WNL WNL Full and painfree   extension WNL WNL Full and painfree     U/E MMT Right Left Pain/Dysfunction with Movement   Shoulder Flexion 4-/5 4/5    Shoulder Extension 4+/5 4+/5    Shoulder Abduction 4/5 4+/5    Shoulder Adduction 5/5 5/5    Shoulder IR 4/5 5/5    Shoulder ER  @ 0* Abduction 4/5 5/5    Shoulder ER  @ 90* Abduction NT NT    Elbow Flexion  4/5 4+/5    Elbow Extension 4/5 4+/5    Rhomboids NT NT    Mid Traps NT NT    Low Traps NT NT       Strength:  Right: 12kgF  Left : 14kgF    Joint Mobility:   - Elbow: good mobility of the radius on ulna proximally. Not flexion/extension mobility deficits  - Shoulder - to be assessed at upcoming visit if needed  - Wrist - pain with ovepressure into flexion, no pain with passive extenions.     Special Tests:   + Coto  + Cozens  + Middle finger resisted extension  + pain with active  "supination and passive pronation    CMS Impairment/Limitation/Restriction for FOTO Survey    Therapist reviewed FOTO scores for Cheryl Espinosa on 7/1/2022.   FOTO documents entered into EPIC - see Media section.    Limitation Score: 58%  Predicted Score: 41%  DASH 58       TREATMENT     Treatment Time In: 8:50  Treatment Time Out: 9:00  Total Treatment time separate from Evaluation: 10 minutes    Cheryl received therapeutic exercises to develop strength, endurance, ROM and posture for 10 minutes including:  Seated scapular retraction - 20x5"  Supported elbow wrist extension - x20 5"  Manual wrist extensor stretch into wrist flexion - 15x5"   c blue foam x15 5"      Home Exercises and Patient Education Provided    Education provided:   - Pt educated on POC  - Pt educated on HEP  - Pt educated on anatomy and physiology of current condition as it relates to signs and symptoms     Written Home Exercises Provided: yes.  Exercises were reviewed and Cheryl was able to demonstrate them prior to the end of the session.  Cheryl demonstrated good  understanding of the education provided.     See EMR under Patient Instructions for exercises provided 7/1/2022.    Assessment     Cheryl is a 51 y.o. female referred to outpatient Physical Therapy with a medical diagnosis of bilateral lateral epicondylitis. Pt presents c some s&s consistent c referring diagnosis as well as radial tunnel syndrome. Pt has forearm pain that radiates to the lateral elbow as well as s&s of nerve irritation along the dorsum of the hand. Pt presents positive right lateral epicondylitis testing in conjunction with tenderness to palpation along the wrist extensor muscles with reproduction of pain down the forearm with resisted supination and passive pronation. Pt currently having difficulty with functional tasks due to aforementioned dysfunction.     Pt will benefit from skilled outpatient Physical Therapy to address the deficits " stated above and in the chart below, provide pt/family education, and to maximize pt's level of independence.   Pt prognosis is Good.       Plan of care discussed with patient: Yes  Pt's spiritual, cultural and educational needs considered and pt agreeable to plan of care and goals as stated below:     Anticipated Barriers for therapy: chronicity      Medical Necessity is demonstrated by the following  History  Co-morbidities and personal factors that may impact the plan of care Co-morbidities:   none    Personal Factors:   coping style     low   Examination  Body Structures and Functions, activity limitations and participation restrictions that may impact the plan of care Body Regions:   upper extremities    Body Systems:    strength  motor control    Participation Restrictions:   none    Activity limitations:   Learning and applying knowledge  no deficits    General Tasks and Commands  no deficits    Communication  no deficits    Mobility  lifting and carrying objects  fine hand use (grasping/picking up)    Self care  no deficits    Domestic Life  doing house work (cleaning house, washing dishes, laundry)    Interactions/Relationships  no deficits    Life Areas  employment    Community and Social Life  community life  recreation and leisure         high   Clinical Presentation stable and uncomplicated low   Decision Making/ Complexity Score: low         Goals:  Short Term Goals (4 Weeks):   1) 1. Pt will be compliant with initial HEP to supplement PT in decreasing pain with functional mobility.  2) Pt will reduce worst pain to 5/10 in order to perform work related tasks.   3) Pt will improve right elbow Flex/ext strength to 4+/5 in order to perform home related tasks with less pain  4) Pt will be able to work for 1 hour without any increase in pain greater than 2 pts on NPRS    Long Term Goals (8 Weeks):  1) Pt will be compliant with final HEP to reduce risk of further injury after dc  2)Pt will improve right  elbow Flex/ext strength to 5/5 in order to perform home related tasks with less pain  4) Pt will be able to work for 2 hour without any increase in pain greater than 2 pts on NPRS  4) Pt will improve FOTO limitation to 41% indicative of overall improvement in function.     Plan     Plan of care Certification: 7/1/2022 to 8/26/2022.    Outpatient Physical Therapy 2 times weekly for 8 weeks to include the following interventions: Electrical Stimulation PRN, Gait Training, Manual Therapy, Moist Heat/ Ice, Neuromuscular Re-ed, Patient Education, Therapeutic Activities and Therapeutic Exercise. All other modalities PRN. Pt will be treated in conjunction with PTA prn. Dry Needling PRN.     Naina Jefferson, PT, DPT, OCS, Cert. DN

## 2022-07-02 ENCOUNTER — LAB VISIT (OUTPATIENT)
Dept: LAB | Facility: HOSPITAL | Age: 52
End: 2022-07-02
Attending: FAMILY MEDICINE
Payer: COMMERCIAL

## 2022-07-02 DIAGNOSIS — Z00.00 ANNUAL PHYSICAL EXAM: ICD-10-CM

## 2022-07-02 LAB
ALBUMIN SERPL BCP-MCNC: 3.6 G/DL (ref 3.5–5.2)
ALP SERPL-CCNC: 65 U/L (ref 55–135)
ALT SERPL W/O P-5'-P-CCNC: 15 U/L (ref 10–44)
ANION GAP SERPL CALC-SCNC: 6 MMOL/L (ref 8–16)
AST SERPL-CCNC: 17 U/L (ref 10–40)
BASOPHILS # BLD AUTO: 0.02 K/UL (ref 0–0.2)
BASOPHILS NFR BLD: 0.4 % (ref 0–1.9)
BILIRUB SERPL-MCNC: 0.6 MG/DL (ref 0.1–1)
BUN SERPL-MCNC: 9 MG/DL (ref 6–20)
CALCIUM SERPL-MCNC: 9.1 MG/DL (ref 8.7–10.5)
CHLORIDE SERPL-SCNC: 108 MMOL/L (ref 95–110)
CHOLEST SERPL-MCNC: 188 MG/DL (ref 120–199)
CHOLEST/HDLC SERPL: 4.5 {RATIO} (ref 2–5)
CO2 SERPL-SCNC: 26 MMOL/L (ref 23–29)
CREAT SERPL-MCNC: 0.7 MG/DL (ref 0.5–1.4)
DIFFERENTIAL METHOD: ABNORMAL
EOSINOPHIL # BLD AUTO: 0.1 K/UL (ref 0–0.5)
EOSINOPHIL NFR BLD: 2.8 % (ref 0–8)
ERYTHROCYTE [DISTWIDTH] IN BLOOD BY AUTOMATED COUNT: 13.6 % (ref 11.5–14.5)
EST. GFR  (AFRICAN AMERICAN): >60 ML/MIN/1.73 M^2
EST. GFR  (NON AFRICAN AMERICAN): >60 ML/MIN/1.73 M^2
ESTIMATED AVG GLUCOSE: 100 MG/DL (ref 68–131)
GLUCOSE SERPL-MCNC: 87 MG/DL (ref 70–110)
HBA1C MFR BLD: 5.1 % (ref 4–5.6)
HCT VFR BLD AUTO: 38.9 % (ref 37–48.5)
HDLC SERPL-MCNC: 42 MG/DL (ref 40–75)
HDLC SERPL: 22.3 % (ref 20–50)
HGB BLD-MCNC: 11.7 G/DL (ref 12–16)
IMM GRANULOCYTES # BLD AUTO: 0.01 K/UL (ref 0–0.04)
IMM GRANULOCYTES NFR BLD AUTO: 0.2 % (ref 0–0.5)
LDLC SERPL CALC-MCNC: 119.2 MG/DL (ref 63–159)
LYMPHOCYTES # BLD AUTO: 1.5 K/UL (ref 1–4.8)
LYMPHOCYTES NFR BLD: 28.8 % (ref 18–48)
MCH RBC QN AUTO: 26.7 PG (ref 27–31)
MCHC RBC AUTO-ENTMCNC: 30.1 G/DL (ref 32–36)
MCV RBC AUTO: 89 FL (ref 82–98)
MONOCYTES # BLD AUTO: 0.4 K/UL (ref 0.3–1)
MONOCYTES NFR BLD: 7 % (ref 4–15)
NEUTROPHILS # BLD AUTO: 3.1 K/UL (ref 1.8–7.7)
NEUTROPHILS NFR BLD: 60.8 % (ref 38–73)
NONHDLC SERPL-MCNC: 146 MG/DL
NRBC BLD-RTO: 0 /100 WBC
PLATELET # BLD AUTO: 261 K/UL (ref 150–450)
PMV BLD AUTO: 10.6 FL (ref 9.2–12.9)
POTASSIUM SERPL-SCNC: 4 MMOL/L (ref 3.5–5.1)
PROT SERPL-MCNC: 6.3 G/DL (ref 6–8.4)
RBC # BLD AUTO: 4.38 M/UL (ref 4–5.4)
SODIUM SERPL-SCNC: 140 MMOL/L (ref 136–145)
TRIGL SERPL-MCNC: 134 MG/DL (ref 30–150)
TSH SERPL DL<=0.005 MIU/L-ACNC: 2.34 UIU/ML (ref 0.4–4)
WBC # BLD AUTO: 5.03 K/UL (ref 3.9–12.7)

## 2022-07-02 PROCEDURE — 36415 COLL VENOUS BLD VENIPUNCTURE: CPT | Mod: PO | Performed by: FAMILY MEDICINE

## 2022-07-02 PROCEDURE — 83036 HEMOGLOBIN GLYCOSYLATED A1C: CPT | Performed by: FAMILY MEDICINE

## 2022-07-02 PROCEDURE — 85025 COMPLETE CBC W/AUTO DIFF WBC: CPT | Performed by: FAMILY MEDICINE

## 2022-07-02 PROCEDURE — 80061 LIPID PANEL: CPT | Performed by: FAMILY MEDICINE

## 2022-07-02 PROCEDURE — 80053 COMPREHEN METABOLIC PANEL: CPT | Performed by: FAMILY MEDICINE

## 2022-07-02 PROCEDURE — 84443 ASSAY THYROID STIM HORMONE: CPT | Performed by: FAMILY MEDICINE

## 2022-07-03 ENCOUNTER — PATIENT MESSAGE (OUTPATIENT)
Dept: ORTHOPEDICS | Facility: CLINIC | Age: 52
End: 2022-07-03
Payer: COMMERCIAL

## 2022-07-06 ENCOUNTER — OFFICE VISIT (OUTPATIENT)
Dept: ORTHOPEDICS | Facility: CLINIC | Age: 52
End: 2022-07-06
Payer: COMMERCIAL

## 2022-07-06 VITALS
RESPIRATION RATE: 18 BRPM | HEART RATE: 89 BPM | DIASTOLIC BLOOD PRESSURE: 70 MMHG | WEIGHT: 200.63 LBS | HEIGHT: 61 IN | OXYGEN SATURATION: 97 % | SYSTOLIC BLOOD PRESSURE: 110 MMHG | BODY MASS INDEX: 37.88 KG/M2

## 2022-07-06 DIAGNOSIS — M17.11 PRIMARY OSTEOARTHRITIS OF RIGHT KNEE: Primary | ICD-10-CM

## 2022-07-06 PROCEDURE — 3078F PR MOST RECENT DIASTOLIC BLOOD PRESSURE < 80 MM HG: ICD-10-PCS | Mod: CPTII,S$GLB,, | Performed by: ORTHOPAEDIC SURGERY

## 2022-07-06 PROCEDURE — 1159F PR MEDICATION LIST DOCUMENTED IN MEDICAL RECORD: ICD-10-PCS | Mod: CPTII,S$GLB,, | Performed by: ORTHOPAEDIC SURGERY

## 2022-07-06 PROCEDURE — 1159F MED LIST DOCD IN RCRD: CPT | Mod: CPTII,S$GLB,, | Performed by: ORTHOPAEDIC SURGERY

## 2022-07-06 PROCEDURE — 3044F PR MOST RECENT HEMOGLOBIN A1C LEVEL <7.0%: ICD-10-PCS | Mod: CPTII,S$GLB,, | Performed by: ORTHOPAEDIC SURGERY

## 2022-07-06 PROCEDURE — 3074F SYST BP LT 130 MM HG: CPT | Mod: CPTII,S$GLB,, | Performed by: ORTHOPAEDIC SURGERY

## 2022-07-06 PROCEDURE — 3044F HG A1C LEVEL LT 7.0%: CPT | Mod: CPTII,S$GLB,, | Performed by: ORTHOPAEDIC SURGERY

## 2022-07-06 PROCEDURE — 3008F PR BODY MASS INDEX (BMI) DOCUMENTED: ICD-10-PCS | Mod: CPTII,S$GLB,, | Performed by: ORTHOPAEDIC SURGERY

## 2022-07-06 PROCEDURE — 99999 PR PBB SHADOW E&M-EST. PATIENT-LVL III: CPT | Mod: PBBFAC,,, | Performed by: ORTHOPAEDIC SURGERY

## 2022-07-06 PROCEDURE — 1160F PR REVIEW ALL MEDS BY PRESCRIBER/CLIN PHARMACIST DOCUMENTED: ICD-10-PCS | Mod: CPTII,S$GLB,, | Performed by: ORTHOPAEDIC SURGERY

## 2022-07-06 PROCEDURE — 99213 OFFICE O/P EST LOW 20 MIN: CPT | Mod: S$GLB,,, | Performed by: ORTHOPAEDIC SURGERY

## 2022-07-06 PROCEDURE — 1160F RVW MEDS BY RX/DR IN RCRD: CPT | Mod: CPTII,S$GLB,, | Performed by: ORTHOPAEDIC SURGERY

## 2022-07-06 PROCEDURE — 3074F PR MOST RECENT SYSTOLIC BLOOD PRESSURE < 130 MM HG: ICD-10-PCS | Mod: CPTII,S$GLB,, | Performed by: ORTHOPAEDIC SURGERY

## 2022-07-06 PROCEDURE — 3078F DIAST BP <80 MM HG: CPT | Mod: CPTII,S$GLB,, | Performed by: ORTHOPAEDIC SURGERY

## 2022-07-06 PROCEDURE — 3008F BODY MASS INDEX DOCD: CPT | Mod: CPTII,S$GLB,, | Performed by: ORTHOPAEDIC SURGERY

## 2022-07-06 PROCEDURE — 99213 PR OFFICE/OUTPT VISIT, EST, LEVL III, 20-29 MIN: ICD-10-PCS | Mod: S$GLB,,, | Performed by: ORTHOPAEDIC SURGERY

## 2022-07-06 PROCEDURE — 99999 PR PBB SHADOW E&M-EST. PATIENT-LVL III: ICD-10-PCS | Mod: PBBFAC,,, | Performed by: ORTHOPAEDIC SURGERY

## 2022-07-06 NOTE — PROGRESS NOTES
"Follow up visit    History of Present Illness:   Cheryl comes to the office for follow up evaluation of right  knee pain.  Started after her last visit. Pain with walking and full extension. Had difficulty walking the other day because of it. Has improved enough that she can fully weight bear. Has been taking diclofenac once per day but does not take every day. Has been icing it  Pain localized to the knee with radiation to the ankle. No injury    Started July 1 with severe pain   + swelling in the knee    ROS: unremarkable and no change since last visit    Physical Examination:    Vitals:    07/06/22 1337   BP: 110/70   Pulse: 89   Resp: 18   SpO2: 97%   Weight: 91 kg (200 lb 9.9 oz)   Height: 5' 1" (1.549 m)   PainSc:   7   PainLoc: Knee      NAD  Right knee   Moderate effusion   TTP over the medial joint line   No erythema, warmth   Ltsi s/s/sp/dp/t  + ehl/fhl/ta/gs  2+ DP    Radiographic imaging: no new. Previous xrays show arthritis     Assessment/Plan:  51 y.o. female  with Right knee OA with flare of pain    We discussed the etiology of persistent pain and further treatment options.  1. Start taking diclofenac twice a day x 2 week  2. Rest, heat   3. Discussed injection, she is not interested at this time, will consider if medications do not help    All questions were answered in detail. The patient  verbalized the understanding of the treatment plan and is in full agreement with the treatment plan.  "

## 2022-07-12 ENCOUNTER — CLINICAL SUPPORT (OUTPATIENT)
Dept: REHABILITATION | Facility: HOSPITAL | Age: 52
End: 2022-07-12
Attending: ORTHOPAEDIC SURGERY
Payer: COMMERCIAL

## 2022-07-12 DIAGNOSIS — M25.522 PAIN OF BOTH ELBOWS: Primary | ICD-10-CM

## 2022-07-12 DIAGNOSIS — M25.521 PAIN OF BOTH ELBOWS: Primary | ICD-10-CM

## 2022-07-12 DIAGNOSIS — R53.1 DECREASED STRENGTH: ICD-10-CM

## 2022-07-12 DIAGNOSIS — M79.10 TRIGGER POINT: ICD-10-CM

## 2022-07-12 PROCEDURE — 97140 MANUAL THERAPY 1/> REGIONS: CPT | Mod: PN

## 2022-07-12 PROCEDURE — 97110 THERAPEUTIC EXERCISES: CPT | Mod: PN

## 2022-07-12 NOTE — PROGRESS NOTES
"[facility]   Physical Therapy Treatment Note     Name: Cheryl Jones [facility]  Clinic Number: [id number]    Therapy Diagnosis:   Encounter Diagnoses   Name Primary?    Pain of both elbows Yes    Decreased strength     Trigger point      Physician: Jayashree Valencia MD    Visit Date: 7/12/2022  Physician Orders: PT Eval and Treat   Medical Diagnosis from Referral:   M77.12 (ICD-10-CM) - Lateral epicondylitis of left elbow   M77.11 (ICD-10-CM) - Lateral epicondylitis, right elbow   M25.511,G89.29 (ICD-10-CM) - Chronic right shoulder pain      Evaluation Date: 7/1/2022  Authorization Period Expiration: 12/31/2022  Plan of Care Expiration: 7/1/2022 to 8/26/2022  Visit # / Visits authorized: 1/ 20 + eval  FOTO#:2/5     Time In: 9:45am   Time Out: 10:45am  Total Billable Time: 60 minutes (2MT, 2TE)     Precautions: Standard  SUBJECTIVE     Pt reports: Pt reports that she has slightly less pain than she did at eval. She can tell that some of the stuff coming from her elbow goes to the back of her hand, and when she feels stuff in the front of her hand, she will do her CT exercises.   She was compliant with home exercise program.  Response to previous treatment: last was evaluation  Functional change: last was evaluation     Pain: 3/10  Location: right elbow      OBJECTIVE     Objective Measures updated at progress report unless specified.     Treatment     Cheryl received the treatments listed below:      Cheryl received therapeutic exercises to develop strength, endurance, ROM and posture for 30 minutes including:  Seated scapular retraction - 20x5"  Supported elbow wrist extension - x20 5"  Manual wrist extensor stretch into wrist flexion - 15x5"   c blue digitflex 2x10 bilateral   Spiderweb finger ext - bilateral 3x10  Pronation/supination 3# 2x10 bilateral     Cheryl received the following manual therapy techniques: Myofacial release, Soft tissue Mobilization and Friction " Massage were applied to the: right ue for 30 minutes, including:  Manual passive pronation and supination   STM wrist extensors  Pt was agreeable to dry needling by a certified dry needling PT and signed a consent prior to treatment after being made aware of risks.  · 50mm needles were inserted into lateral elbow   · Electrical stimulation was applied to these needles at 2 Hz and a pulse of 250 µseconds for 12 minutes with a prison check.   · Afterwards, needles were removed and placed into a sharps container. Pt reported a good response to treatment.      Patient Education and Home Exercises     Home Exercises Provided and Patient Education Provided     Education provided:   - Pt educated on POC  - Pt educated on anatomy and physiology of current conditions as it relates to signs and symptoms  - Pt educated on HEP     Written Home Exercises Provided: Patient instructed to cont prior HEP. Exercises were reviewed and Cheryl was able to demonstrate them prior to the end of the session.  Cheryl demonstrated good  understanding of the education provided. See EMR under Patient Instructions for exercises provided during therapy sessions    ASSESSMENT     Pt presents today with continued lateral elbow pain with radiation into the posterior hand. Manual assessment of supinator increases symptoms to the posterior hand indicative of radial nerve irritation. Pt was cleared for contraindications to dry needling which was performed to address muscle dysfunction in the lateral forearm. Pt tolerated tx without any increase in pain and will continue to be monitored.     Cheryl Is progressing well towards her goals.   Pt prognosis is Good.     Pt will continue to benefit from skilled outpatient physical therapy to address the deficits listed in the problem list box on initial evaluation, provide pt/family education and to maximize pt's level of independence in the home and community environment.     Pt's spiritual,  cultural and educational needs considered and pt agreeable to plan of care and goals.     Anticipated barriers to physical therapy: chronicity    Goals:   Short Term Goals (4 Weeks):   1) 1. Pt will be compliant with initial HEP to supplement PT in decreasing pain with functional mobility.  2) Pt will reduce worst pain to 5/10 in order to perform work related tasks.   3) Pt will improve right elbow Flex/ext strength to 4+/5 in order to perform home related tasks with less pain  4) Pt will be able to work for 1 hour without any increase in pain greater than 2 pts on NPRS     Long Term Goals (8 Weeks):  1) Pt will be compliant with final HEP to reduce risk of further injury after dc  2)Pt will improve right elbow Flex/ext strength to 5/5 in order to perform home related tasks with less pain  4) Pt will be able to work for 2 hour without any increase in pain greater than 2 pts on NPRS  4) Pt will improve FOTO limitation to 41% indicative of overall improvement in function.     PLAN     Continue with plan of care as indicated.     Naina Jefferson, PT, DPT, OCS, Cert. DN

## 2022-07-15 ENCOUNTER — CLINICAL SUPPORT (OUTPATIENT)
Dept: REHABILITATION | Facility: HOSPITAL | Age: 52
End: 2022-07-15
Attending: ORTHOPAEDIC SURGERY
Payer: COMMERCIAL

## 2022-07-15 DIAGNOSIS — M79.10 TRIGGER POINT: ICD-10-CM

## 2022-07-15 DIAGNOSIS — M25.521 PAIN OF BOTH ELBOWS: Primary | ICD-10-CM

## 2022-07-15 DIAGNOSIS — M25.522 PAIN OF BOTH ELBOWS: Primary | ICD-10-CM

## 2022-07-15 DIAGNOSIS — R53.1 DECREASED STRENGTH: ICD-10-CM

## 2022-07-15 PROCEDURE — 97110 THERAPEUTIC EXERCISES: CPT | Mod: PN

## 2022-07-15 PROCEDURE — 97140 MANUAL THERAPY 1/> REGIONS: CPT | Mod: PN

## 2022-07-15 NOTE — PROGRESS NOTES
"[facility]   Physical Therapy Treatment Note     Name: Cheryl Espinosa  Clinic Number: [id number]    Therapy Diagnosis:   Encounter Diagnoses   Name Primary?    Pain of both elbows Yes    Decreased strength     Trigger point      Physician: Jayashree Valencia MD    Visit Date: 7/15/2022  Physician Orders: PT Eval and Treat   Medical Diagnosis from Referral:   M77.12 (ICD-10-CM) - Lateral epicondylitis of left elbow   M77.11 (ICD-10-CM) - Lateral epicondylitis, right elbow   M25.511,G89.29 (ICD-10-CM) - Chronic right shoulder pain      Evaluation Date: 7/1/2022  Authorization Period Expiration: 12/31/2022  Plan of Care Expiration: 7/1/2022 to 8/26/2022  Visit # / Visits authorized: 2/ 20 + eval  FOTO#:3/5     Time In: 11:02am  Time Out: 12:00pm  Total Billable Time: 58 minutes (1MT, 3TE)     Precautions: Standard  SUBJECTIVE     Pt reports: Pt reports that her elbow pain has diminished quite a bit. She felt relief within an hour of dry needling last time. She is noticing more of her shoulder pain today. Pain is along the lateral shoulder.   She was compliant with home exercise program.  Response to previous treatment: reduction in elbow pain   Functional change: not wearing wrist braces anymore    Pain: 2/10  Location: right elbow      OBJECTIVE     Objective Measures updated at progress report unless specified.     MMT:  Shoulder internal rotation: 4/5 right, 4+/5 left  Shoulder external rotation 4/5 right, 4+/5 left  Lower trap 3+/5 bilateral   Middle trap 4-/5 bilateral     Treatment     Cheryl received the treatments listed below:      Bold = Performed     Cheryl received therapeutic exercises to develop strength, endurance, ROM and posture for 49 minutes including:  Prone internal rotation/ER 3x10  Prone middle trap 3x10  Prone Lower trap chicken wing - 3x10  Seated scapular retraction - 20x5"  Supported elbow wrist extension - 3x10 5" 1#  Manual wrist extensor stretch " "into wrist flexion - 15x5"   c blue digitflex 3x10 bilateral   Spiderweb finger ext - bilateral 3x10  Pronation/supination 3# 2x10 bilateral     Cheryl received the following manual therapy techniques: Myofacial release, Soft tissue Mobilization and Friction Massage were applied to the: right ue for 11 minutes, including:  Manual passive pronation and supination   AYSTM wrist extensors  Pt was agreeable to dry needling by a certified dry needling PT and signed a consent prior to treatment after being made aware of risks.  · 50mm needles were inserted into lateral elbow   · Electrical stimulation was applied to these needles at 2 Hz and a pulse of 250 µseconds for 12 minutes with a FCI check.   · Afterwards, needles were removed and placed into a sharps container. Pt reported a good response to treatment.      Patient Education and Home Exercises     Home Exercises Provided and Patient Education Provided     Education provided:   - Pt educated on POC  - Pt educated on anatomy and physiology of current conditions as it relates to signs and symptoms  - Pt educated on HEP     Written Home Exercises Provided: Patient instructed to cont prior HEP. Exercises were reviewed and Cheryl was able to demonstrate them prior to the end of the session.  Cheryl demonstrated good  understanding of the education provided. See EMR under Patient Instructions for exercises provided during therapy sessions    ASSESSMENT     Pt presents to PT today with diminishing lateral elbow pain. Pt is paying more attention do her other dysfunctions including the shoulder. Based on testing performed today, suspect shoulder weakness in conjunction with poor body mechanics causing impingement in the right shoulder. Additions made to program today to incorporate shoulder strength and stability.     Cheryl Is progressing well towards her goals.   Pt prognosis is Good.     Pt will continue to benefit from skilled outpatient physical " therapy to address the deficits listed in the problem list box on initial evaluation, provide pt/family education and to maximize pt's level of independence in the home and community environment.     Pt's spiritual, cultural and educational needs considered and pt agreeable to plan of care and goals.     Anticipated barriers to physical therapy: chronicity    Goals:   Short Term Goals (4 Weeks):   1) 1. Pt will be compliant with initial HEP to supplement PT in decreasing pain with functional mobility.  2) Pt will reduce worst pain to 5/10 in order to perform work related tasks.   3) Pt will improve right elbow Flex/ext strength to 4+/5 in order to perform home related tasks with less pain  4) Pt will be able to work for 1 hour without any increase in pain greater than 2 pts on NPRS     Long Term Goals (8 Weeks):  1) Pt will be compliant with final HEP to reduce risk of further injury after dc  2)Pt will improve right elbow Flex/ext strength to 5/5 in order to perform home related tasks with less pain  4) Pt will be able to work for 2 hour without any increase in pain greater than 2 pts on NPRS  4) Pt will improve FOTO limitation to 41% indicative of overall improvement in function.     PLAN     Continue with plan of care as indicated.     Naina Jefferson, PT, DPT, OCS, Cert. DN

## 2022-07-20 ENCOUNTER — CLINICAL SUPPORT (OUTPATIENT)
Dept: REHABILITATION | Facility: HOSPITAL | Age: 52
End: 2022-07-20
Attending: ORTHOPAEDIC SURGERY
Payer: COMMERCIAL

## 2022-07-20 DIAGNOSIS — M25.521 PAIN OF BOTH ELBOWS: Primary | ICD-10-CM

## 2022-07-20 DIAGNOSIS — R53.1 DECREASED STRENGTH: ICD-10-CM

## 2022-07-20 DIAGNOSIS — M79.10 TRIGGER POINT: ICD-10-CM

## 2022-07-20 DIAGNOSIS — M25.522 PAIN OF BOTH ELBOWS: Primary | ICD-10-CM

## 2022-07-20 PROCEDURE — 97140 MANUAL THERAPY 1/> REGIONS: CPT | Mod: PN

## 2022-07-20 NOTE — PROGRESS NOTES
"[facility]   Physical Therapy Treatment Note     Name: Cheryl Huber  Clinic Number: [id number]    Therapy Diagnosis:   Encounter Diagnoses   Name Primary?    Pain of both elbows Yes    Decreased strength     Trigger point      Physician: Jayashree Valencia MD    Visit Date: 7/20/2022  Physician Orders: PT Eval and Treat   Medical Diagnosis from Referral:   M77.12 (ICD-10-CM) - Lateral epicondylitis of left elbow   M77.11 (ICD-10-CM) - Lateral epicondylitis, right elbow   M25.511,G89.29 (ICD-10-CM) - Chronic right shoulder pain      Evaluation Date: 7/1/2022  Authorization Period Expiration: 12/31/2022  Plan of Care Expiration: 7/1/2022 to 8/26/2022  Visit # / Visits authorized: 3/ 20 + eval  FOTO#:4/5     Time In: 1:00pm  Time Out: 2:00pm  Total Billable Time: 58 minutes (4MT)     Precautions: Standard  SUBJECTIVE     Pt reports: Pt reports that she is feeling a little tingilng in her fingers. She said it started more when she does all of her exercises. The elbow pain is much better but got a bit aggravated more recently but she has been doing a lot.   She was compliant with home exercise program.  Response to previous treatment: reduction in elbow pain   Functional change: doing more at home     Pain: 2/10  Location: right elbow      OBJECTIVE     Objective Measures updated at progress report unless specified.     Special Tests:  ULTT 1 - Median + bilateral   ULTT Radial + on R  ULTT Ulnar + Bilat     Treatment     Cheryl received the treatments listed below:      Bold = Performed     Cheryl received therapeutic exercises to develop strength, endurance, ROM and posture for 00 minutes including:    Prone internal rotation/ER 3x10  Prone middle trap 3x10  Prone Lower trap chicken wing - 3x10  Seated scapular retraction - 20x5"  Supported elbow wrist extension - 3x10 5" 1#  Manual wrist extensor stretch into wrist flexion - 15x5"   c blue digitflex 3x10 bilateral "   Spiderweb finger ext - bilateral 3x10  Pronation/supination 3# 2x10 bilateral     Cheryl received the following manual therapy techniques: Myofacial release, Soft tissue Mobilization and Friction Massage were applied to the: right ue for 55 minutes, including:  Median nerve glide manual x1min  Ulnar nerve glide manual x1min  Radial nerve glide manual x1min   - Post Gliding, pt no longer had tingling in her fingers.   Manual passive pronation and supination   AYSTM wrist extensors  Pt was agreeable to dry needling by a certified dry needling PT and signed a consent prior to treatment after being made aware of risks.  · 50mm needles were inserted into lateral elbow bilateral   · Electrical stimulation was applied to these needles at 2 Hz and a pulse of 250 µseconds for 15 minutes with a shelter check.   · Afterwards, needles were removed and placed into a sharps container. Pt reported a good response to treatment.      Patient Education and Home Exercises     Home Exercises Provided and Patient Education Provided     Education provided:   - Pt educated on POC  - Pt educated on anatomy and physiology of current conditions as it relates to signs and symptoms  - Pt educated on HEP - provided new worksheet with nerve glides.     Written Home Exercises Provided: Patient instructed to cont prior HEP. Exercises were reviewed and Cheryl was able to demonstrate them prior to the end of the session.  Cheryl demonstrated good  understanding of the education provided. See EMR under Patient Instructions for exercises provided during therapy sessions    ASSESSMENT     Pt continues to present c bilateral trigger points in lateral elbow. Pt demonstrated some increased adverse neural tension that was reproduced with median, ulnar, and radial nerve tension testing. Suspect overall irritaion of nervous system from most recent introduction to upper extremity exercises. Gliding of all three nerves reduced pt complaints of  tingling in the hand. Pt provided with worksheet with three gliding techniques to attempt prior to exercises to see if there is further reduction of pain.     Cheryl Is progressing well towards her goals.   Pt prognosis is Good.     Pt will continue to benefit from skilled outpatient physical therapy to address the deficits listed in the problem list box on initial evaluation, provide pt/family education and to maximize pt's level of independence in the home and community environment.     Pt's spiritual, cultural and educational needs considered and pt agreeable to plan of care and goals.     Anticipated barriers to physical therapy: chronicity    Goals:   Short Term Goals (4 Weeks):   1) 1. Pt will be compliant with initial HEP to supplement PT in decreasing pain with functional mobility.  2) Pt will reduce worst pain to 5/10 in order to perform work related tasks.   3) Pt will improve right elbow Flex/ext strength to 4+/5 in order to perform home related tasks with less pain  4) Pt will be able to work for 1 hour without any increase in pain greater than 2 pts on NPRS     Long Term Goals (8 Weeks):  1) Pt will be compliant with final HEP to reduce risk of further injury after dc  2)Pt will improve right elbow Flex/ext strength to 5/5 in order to perform home related tasks with less pain  4) Pt will be able to work for 2 hour without any increase in pain greater than 2 pts on NPRS  4) Pt will improve FOTO limitation to 41% indicative of overall improvement in function.     PLAN     Continue with plan of care as indicated.     Naina Jefferson, PT, DPT, OCS, Cert. DN

## 2022-07-28 NOTE — PROGRESS NOTES
Your results look fine and do not require any change in treatment.     Please contact me if you have any additional concerns.    Sincerely,    Julienne Luevano

## 2022-08-03 ENCOUNTER — CLINICAL SUPPORT (OUTPATIENT)
Dept: REHABILITATION | Facility: HOSPITAL | Age: 52
End: 2022-08-03
Attending: ORTHOPAEDIC SURGERY
Payer: COMMERCIAL

## 2022-08-03 DIAGNOSIS — M79.10 TRIGGER POINT: ICD-10-CM

## 2022-08-03 DIAGNOSIS — M25.522 PAIN OF BOTH ELBOWS: Primary | ICD-10-CM

## 2022-08-03 DIAGNOSIS — M25.521 PAIN OF BOTH ELBOWS: Primary | ICD-10-CM

## 2022-08-03 DIAGNOSIS — R53.1 DECREASED STRENGTH: ICD-10-CM

## 2022-08-03 DIAGNOSIS — K21.9 GASTROESOPHAGEAL REFLUX DISEASE, UNSPECIFIED WHETHER ESOPHAGITIS PRESENT: ICD-10-CM

## 2022-08-03 PROCEDURE — 97110 THERAPEUTIC EXERCISES: CPT | Mod: PN

## 2022-08-03 PROCEDURE — 97140 MANUAL THERAPY 1/> REGIONS: CPT | Mod: PN

## 2022-08-03 RX ORDER — OMEPRAZOLE 40 MG/1
40 CAPSULE, DELAYED RELEASE ORAL DAILY
Qty: 30 CAPSULE | Refills: 11 | Status: SHIPPED | OUTPATIENT
Start: 2022-08-03 | End: 2023-08-04 | Stop reason: SDUPTHER

## 2022-08-03 NOTE — PROGRESS NOTES
[facility]   Physical Therapy Treatment Note     Name: Cheryl Huber  Clinic Number: [id number]    Therapy Diagnosis:   Encounter Diagnoses   Name Primary?    Pain of both elbows Yes    Decreased strength     Trigger point      Physician: Jayashree Valencia MD    Visit Date: 8/3/2022  Physician Orders: PT Eval and Treat   Medical Diagnosis from Referral:   M77.12 (ICD-10-CM) - Lateral epicondylitis of left elbow   M77.11 (ICD-10-CM) - Lateral epicondylitis, right elbow   M25.511,G89.29 (ICD-10-CM) - Chronic right shoulder pain      Evaluation Date: 7/1/2022  Authorization Period Expiration: 12/31/2022  Plan of Care Expiration: 7/1/2022 to 8/26/2022  Visit # / Visits authorized: 5/ 20 + eval  FOTO#:NEXT     Time In: 4:30pm  Time Out: 5:32pm  Total Billable Time: 62 minutes (3MT, 1TE)     Precautions: Standard  SUBJECTIVE     Pt reports: Pt reports that her elbows are again feeling much better. She did notice that the N/T in the hands is much less. She has been doing the new exercises provided last visit. She also reports that she had a little bit of discomfort after having to carry groceries and felt it primarily in the lateral elbow.  She was compliant with home exercise program.  Response to previous treatment: reduction in elbow pain   Functional change: doing more at home     Pain: 1/10  Location: right elbow> left elbow    OBJECTIVE     Objective Measures updated at progress report unless specified.     Palpation:  Slight reproduction of hand discomfort with palpation of the ulnar nerve at ulnar groove.   Continue Trigger points located in wrist extensor muscles.    Treatment     Cheryl received the treatments listed below:      Bold = Performed     Cheryl received therapeutic exercises to develop strength, endurance, ROM and posture for 17 minutes including:    Scap retract over 1/2 foam in chair - 2x1min  Thoracic ext over 1/2 foam in chair - 2x1min   Seated  "pronation/supination 3# - 3x10  Seated wrist ext c 3# focus on eccentric control     Prone internal rotation/ER 3x10  Prone middle trap 3x10  Prone Lower trap chicken wing - 3x10  Supported elbow wrist extension - 3x10 5" 1#  Manual wrist extensor stretch into wrist flexion - 15x5"   c blue digitflex 3x10 bilateral   Spiderweb finger ext - bilateral 3x10    Cheryl received the following manual therapy techniques: Myofacial release, Soft tissue Mobilization and Friction Massage were applied to the: right ue for 43 minutes, including:  Median nerve glide manual x1min  Ulnar nerve glide manual x1min  Radial nerve glide manual x1min   - Post Gliding, pt no longer had tingling in her fingers.   Manual passive pronation and supination   Radial rotation and AP mobilization bilateral   HVLAT Hooklying Thoracic Region - cavitation noted    Pt was agreeable to dry needling by a certified dry needling PT and signed a consent prior to treatment after being made aware of risks.  · 40mm needles were inserted into lateral elbow right  · Electrical stimulation was applied to these needles at 2 Hz and a pulse of 250 µseconds for 15 minutes with a shelter check.   · Afterwards, needles were removed and placed into a sharps container. Pt reported a good response to treatment.      Patient Education and Home Exercises     Home Exercises Provided and Patient Education Provided     Education provided:   - Pt educated on POC  - Pt educated on anatomy and physiology of current conditions as it relates to signs and symptoms  - Pt educated on HEP - provided new worksheet with nerve glides.     Written Home Exercises Provided: Patient instructed to cont prior HEP. Exercises were reviewed and Cheryl was able to demonstrate them prior to the end of the session.  Cheryl demonstrated good  understanding of the education provided. See EMR under Patient Instructions for exercises provided during therapy sessions    ASSESSMENT     Pt " presents to PT today with gradually improving lateral elbow pain. Neural tension continues to be a contributing factor. HVLAT was performed today with cavitation and PT will monitor response. Pt continues to demonstrate active trigger points throughout the wrist extensors, tenderness at insertion of the common extensor tendon. Pt continues to show a good response to functional dry needling and only the right elbow was treated today. Pt continues to have functional deficits due to dysfunction.     Cheryl Is progressing well towards her goals.   Pt prognosis is Good.     Pt will continue to benefit from skilled outpatient physical therapy to address the deficits listed in the problem list box on initial evaluation, provide pt/family education and to maximize pt's level of independence in the home and community environment.     Pt's spiritual, cultural and educational needs considered and pt agreeable to plan of care and goals.     Anticipated barriers to physical therapy: chronicity    Goals:   Short Term Goals (4 Weeks):   1) 1. Pt will be compliant with initial HEP to supplement PT in decreasing pain with functional mobility.  2) Pt will reduce worst pain to 5/10 in order to perform work related tasks.   3) Pt will improve right elbow Flex/ext strength to 4+/5 in order to perform home related tasks with less pain  4) Pt will be able to work for 1 hour without any increase in pain greater than 2 pts on NPRS     Long Term Goals (8 Weeks):  1) Pt will be compliant with final HEP to reduce risk of further injury after dc  2)Pt will improve right elbow Flex/ext strength to 5/5 in order to perform home related tasks with less pain  4) Pt will be able to work for 2 hour without any increase in pain greater than 2 pts on NPRS  4) Pt will improve FOTO limitation to 41% indicative of overall improvement in function.     PLAN     Continue with plan of care as indicated.     Naina Jefferson, PT, DPT, OCS, Cert. DN

## 2022-08-05 ENCOUNTER — CLINICAL SUPPORT (OUTPATIENT)
Dept: REHABILITATION | Facility: HOSPITAL | Age: 52
End: 2022-08-05
Attending: ORTHOPAEDIC SURGERY
Payer: COMMERCIAL

## 2022-08-05 DIAGNOSIS — M25.522 PAIN OF BOTH ELBOWS: Primary | ICD-10-CM

## 2022-08-05 DIAGNOSIS — M79.10 TRIGGER POINT: ICD-10-CM

## 2022-08-05 DIAGNOSIS — M25.521 PAIN OF BOTH ELBOWS: Primary | ICD-10-CM

## 2022-08-05 DIAGNOSIS — R53.1 DECREASED STRENGTH: ICD-10-CM

## 2022-08-05 PROCEDURE — 97140 MANUAL THERAPY 1/> REGIONS: CPT | Mod: PN

## 2022-08-05 PROCEDURE — 97110 THERAPEUTIC EXERCISES: CPT | Mod: PN

## 2022-08-05 NOTE — PROGRESS NOTES
"[facility]   Physical Therapy Treatment Note     Name: Cheryl Huber  Clinic Number: [id number]    Therapy Diagnosis:   Encounter Diagnoses   Name Primary?    Pain of both elbows Yes    Decreased strength     Trigger point      Physician: Jayashree Valencia MD    Visit Date: 8/5/2022  Physician Orders: PT Eval and Treat   Medical Diagnosis from Referral:   M77.12 (ICD-10-CM) - Lateral epicondylitis of left elbow   M77.11 (ICD-10-CM) - Lateral epicondylitis, right elbow   M25.511,G89.29 (ICD-10-CM) - Chronic right shoulder pain      Evaluation Date: 7/1/2022  Authorization Period Expiration: 12/31/2022  Plan of Care Expiration: 7/1/2022 to 8/26/2022  Visit # / Visits authorized: 6/ 20 + eval  FOTO#:NEXT     Time In: 7:50am  Time Out: 8:45am  Total Billable Time: 55 minutes (1MT, 3TE)     Precautions: Standard  SUBJECTIVE     Pt reports: Pt reports that she only really feels it today in the elbow, the muscles feel a lot better and the hand pain hasnt really come back yet. Pt reports that the thing PT did to her upper back definitely helped her arms feel better.   She was compliant with home exercise program.  Response to previous treatment: reduction in elbow pain   Functional change: doing more at home     Pain: 1/10  Location: right elbow> left elbow    OBJECTIVE     Objective Measures updated at progress report unless specified.     Treatment     Cheryl received the treatments listed below:      Bold = Performed     Cheryl received therapeutic exercises to develop strength, endurance, ROM and posture for 45   minutes including:    Scap retract over 1/2 foam in chair - 3x1min  Thoracic ext over 1/2 foam in chair - 3x1min   Seated pronation/supination 3# - 3x15  Seated wrist ext c 3# focus on eccentric control 3x10   bilateral external rotation c elevation yellow theraband - 3x10  Row c green theraband - 3x10 5" holds    Prone internal rotation/ER 3x10  Prone middle trap " "3x10  Prone Lower trap chicken wing - 3x10  Supported elbow wrist extension - 3x10 5" 1#  Manual wrist extensor stretch into wrist flexion - 15x5"   c blue digitflex 3x10 bilateral   Spiderweb finger ext - bilateral 3x10    Cheryl received the following manual therapy techniques: Myofacial release, Soft tissue Mobilization and Friction Massage were applied to the: right ue for 10 minutes, including:  Median nerve glide manual x1min  Ulnar nerve glide manual x1min  Radial nerve glide manual x1min   - Post Gliding, pt no longer had tingling in her fingers.   Manual passive pronation and supination   Radial rotation and AP mobilization bilateral   Ulnar distraction mobilization bilateral   HVLAT Hooklying Thoracic Region - cavitation noted    Pt was agreeable to dry needling by a certified dry needling PT and signed a consent prior to treatment after being made aware of risks.  · 40mm needles were inserted into lateral elbow right  · Electrical stimulation was applied to these needles at 2 Hz and a pulse of 250 µseconds for 15 minutes with a custodial check.   · Afterwards, needles were removed and placed into a sharps container. Pt reported a good response to treatment.      Patient Education and Home Exercises     Home Exercises Provided and Patient Education Provided     Education provided:   - Pt educated on POC  - Pt educated on anatomy and physiology of current conditions as it relates to signs and symptoms  - Pt educated on HEP - provided new worksheet with nerve glides.     Written Home Exercises Provided: Patient instructed to cont prior HEP. Exercises were reviewed and Cheryl was able to demonstrate them prior to the end of the session.  Cheryl demonstrated good  understanding of the education provided. See EMR under Patient Instructions for exercises provided during therapy sessions    ASSESSMENT     Pt presents to PT with improvements since last visit. Upper thoracic HVLAT seemed to have a " positive effect on pt symptoms. Pt has reduced wrist extensor pain and improve mobility. Pt continues to find relief c mobilization of the elbow. Further postural stability exercises were added to program today in order to facilitate improvement in thoracic mobility.  Pt to continue to be progressed as symptoms subside.     Cheryl Is progressing well towards her goals.   Pt prognosis is Good.     Pt will continue to benefit from skilled outpatient physical therapy to address the deficits listed in the problem list box on initial evaluation, provide pt/family education and to maximize pt's level of independence in the home and community environment.     Pt's spiritual, cultural and educational needs considered and pt agreeable to plan of care and goals.     Anticipated barriers to physical therapy: chronicity    Goals:   Short Term Goals (4 Weeks):   1) 1. Pt will be compliant with initial HEP to supplement PT in decreasing pain with functional mobility.  2) Pt will reduce worst pain to 5/10 in order to perform work related tasks.   3) Pt will improve right elbow Flex/ext strength to 4+/5 in order to perform home related tasks with less pain  4) Pt will be able to work for 1 hour without any increase in pain greater than 2 pts on NPRS     Long Term Goals (8 Weeks):  1) Pt will be compliant with final HEP to reduce risk of further injury after dc  2)Pt will improve right elbow Flex/ext strength to 5/5 in order to perform home related tasks with less pain  4) Pt will be able to work for 2 hour without any increase in pain greater than 2 pts on NPRS  4) Pt will improve FOTO limitation to 41% indicative of overall improvement in function.     PLAN     Continue with plan of care as indicated.     Naina Jefferson, PT, DPT, OCS, Cert. DN

## 2022-08-09 ENCOUNTER — CLINICAL SUPPORT (OUTPATIENT)
Dept: REHABILITATION | Facility: HOSPITAL | Age: 52
End: 2022-08-09
Attending: ORTHOPAEDIC SURGERY
Payer: COMMERCIAL

## 2022-08-09 DIAGNOSIS — M25.522 PAIN OF BOTH ELBOWS: Primary | ICD-10-CM

## 2022-08-09 DIAGNOSIS — M25.521 PAIN OF BOTH ELBOWS: Primary | ICD-10-CM

## 2022-08-09 DIAGNOSIS — R53.1 DECREASED STRENGTH: ICD-10-CM

## 2022-08-09 DIAGNOSIS — M79.10 TRIGGER POINT: ICD-10-CM

## 2022-08-09 PROCEDURE — 97140 MANUAL THERAPY 1/> REGIONS: CPT | Mod: PN

## 2022-08-09 PROCEDURE — 97110 THERAPEUTIC EXERCISES: CPT | Mod: PN

## 2022-08-09 NOTE — PROGRESS NOTES
[facility]   Physical Therapy Treatment Note     [id number]    Therapy Diagnosis:   Encounter Diagnoses   Name Primary?    Pain of both elbows Yes    Decreased strength     Trigger point      Physician: Jayashree Valencia MD    Visit Date: 8/9/2022  Physician Orders: PT Eval and Treat   Medical Diagnosis from Referral:   M77.12 (ICD-10-CM) - Lateral epicondylitis of left elbow   M77.11 (ICD-10-CM) - Lateral epicondylitis, right elbow   M25.511,G89.29 (ICD-10-CM) - Chronic right shoulder pain      Evaluation Date: 7/1/2022  Authorization Period Expiration: 12/31/2022  Plan of Care Expiration: 7/1/2022 to 8/26/2022  Visit # / Visits authorized: 6/20 + eval  FOTO#: NEXT     Time In: 5:20 PM  Time Out: 6:25 PM  Total Billable Time: 65 minutes (1MT, 3TE)     Precautions: Standard  SUBJECTIVE     Pt reports: Overall she feels as though the therapy is helping, including the dry needling. She is seeing improvements in pain and is continuing to work on her exercises at home to supplement therapy.   She was compliant with home exercise program.  Response to previous treatment: reduction in elbow pain   Functional change: doing more at home     Pain: 1/10  Location: right elbow> left elbow    OBJECTIVE     Objective Measures updated at progress report unless specified.     Treatment     Cheryl received the treatments listed below:      Bold = Performed     Cheryl received the following manual therapy techniques: Myofacial release, Soft tissue Mobilization and Friction Massage were applied to the: right ue for 10 minutes, including:  Manual passive pronation and supination   Radial AP mobilization bilateral   Ulnar distraction mobilization bilateral   Median nerve glide manual x1min  Ulnar nerve glide manual x1min  Radial nerve glide manual x1min   - Post Gliding, pt no longer had tingling in her fingers.   HVLAT Hooklying Thoracic Region -  "cavitation noted      Pt was agreeable to dry needling by a certified dry needling PT and signed a consent prior to treatment after being made aware of risks.  · 3 - 30mm needles were inserted into lateral elbow right  · Electrical stimulation was applied to these needles at 2 Hz and a pulse of 250 µseconds for 15 minutes with a residential check. - NT  Afterwards, needles were removed and placed into a sharps container. Pt reported a good response to treatment.        Cheryl received therapeutic exercises to develop strength, endurance, ROM and posture for 55 minutes including:    Scap retract over 1/2 foam in chair - 3x1min  Thoracic ext over small foam roller in chair - 10x10" holds  Seated pronation/supination 3# - 3x15  Seated wrist ext c 3# focus on eccentric control 3x15, 3#  +Seated wrist radial deviation: 2x15, 2#  Bilateral external rotation c elevation yellow theraband - 3x10  Row c green theraband - 3x10 5" holds  +Standing Resisted Y's: 2x10, YTB, 3" holds  +Standing Resisted T's: 2x10, YTB, 3" holds    Prone internal rotation/ER 3x10  Prone middle trap 3x10  Prone Lower trap chicken wing - 3x10  Supported elbow wrist extension - 3x10 5" 1#  Manual wrist extensor stretch into wrist flexion - 15x5"   c blue digitflex 3x10 bilateral   Spiderweb finger ext - bilateral 3x10      Patient Education and Home Exercises     Home Exercises Provided and Patient Education Provided     Education provided:   - Pt educated on POC  - Pt educated on anatomy and physiology of current conditions as it relates to signs and symptoms  - Pt educated on HEP - provided new worksheet with nerve glides.     Written Home Exercises Provided: Patient instructed to cont prior HEP. Exercises were reviewed and Cheryl was able to demonstrate them prior to the end of the session.  Cheryl demonstrated good  understanding of the education provided. See EMR under Patient Instructions for exercises provided during therapy " sessions    ASSESSMENT     Cheryl presented to therapy with reports of overall improvements in pain since beginning therapy. Tolerated session well with trigger point dry needling performed to extensors with good tolerance and response. Continued to focus on improving extensor strength as well as postural strength and stability. Tolerated addition of resisted standing Y's and T's with appropriate fatigue noted. Tolerated increase in bolster thickness with seated thoracic extension to further thoracic mobility. Pt to continue to be progressed as symptoms subside.     Cheryl Is progressing well towards her goals.   Pt prognosis is Good.     Pt will continue to benefit from skilled outpatient physical therapy to address the deficits listed in the problem list box on initial evaluation, provide pt/family education and to maximize pt's level of independence in the home and community environment.     Pt's spiritual, cultural and educational needs considered and pt agreeable to plan of care and goals.     Anticipated barriers to physical therapy: chronicity    Goals:   Short Term Goals (4 Weeks):   1) 1. Pt will be compliant with initial HEP to supplement PT in decreasing pain with functional mobility.  2) Pt will reduce worst pain to 5/10 in order to perform work related tasks.   3) Pt will improve right elbow Flex/ext strength to 4+/5 in order to perform home related tasks with less pain  4) Pt will be able to work for 1 hour without any increase in pain greater than 2 pts on NPRS     Long Term Goals (8 Weeks):  1) Pt will be compliant with final HEP to reduce risk of further injury after dc.   2)Pt will improve right elbow Flex/ext strength to 5/5 in order to perform home related tasks with less pain  4) Pt will be able to work for 2 hour without any increase in pain greater than 2 pts on NPRS  4) Pt will improve FOTO limitation to 41% indicative of overall improvement in function.     PLAN     Continue with plan  of care as indicated.     Lisa Fernandez, PT, DPT, Cert. DN

## 2022-08-11 ENCOUNTER — CLINICAL SUPPORT (OUTPATIENT)
Dept: REHABILITATION | Facility: HOSPITAL | Age: 52
End: 2022-08-11
Attending: ORTHOPAEDIC SURGERY
Payer: COMMERCIAL

## 2022-08-11 DIAGNOSIS — M25.521 PAIN OF BOTH ELBOWS: Primary | ICD-10-CM

## 2022-08-11 DIAGNOSIS — M79.10 TRIGGER POINT: ICD-10-CM

## 2022-08-11 DIAGNOSIS — M25.522 PAIN OF BOTH ELBOWS: Primary | ICD-10-CM

## 2022-08-11 DIAGNOSIS — R53.1 DECREASED STRENGTH: ICD-10-CM

## 2022-08-11 PROCEDURE — 97140 MANUAL THERAPY 1/> REGIONS: CPT | Mod: PN

## 2022-08-11 PROCEDURE — 97110 THERAPEUTIC EXERCISES: CPT | Mod: PN

## 2022-08-11 NOTE — PROGRESS NOTES
[facility]   Physical Therapy Treatment Note     [facility]  [facility] Number[facility] [id number]    Therapy Diagnosis:   Encounter Diagnoses   Name Primary?    Pain of both elbows Yes    Decreased strength     Trigger point      Physician: Jayashree Valencia MD    Visit Date: 8/11/2022  Physician Orders: PT Eval and Treat   Medical Diagnosis from Referral:   M77.12 (ICD-10-CM) - Lateral epicondylitis of left elbow   M77.11 (ICD-10-CM) - Lateral epicondylitis, right elbow   M25.511,G89.29 (ICD-10-CM) - Chronic right shoulder pain      Evaluation Date: 7/1/2022  Authorization Period Expiration: 12/31/2022  Plan of Care Expiration: 7/1/2022 to 8/26/2022  Visit # / Visits authorized: 6/20 + eval  FOTO#:obtained 8/11/2022     Time In: 8:45 aM  Time Out: 9:45 aM  Total Billable Time: 60 minutes (1MT, 3TE)     Precautions: Standard  SUBJECTIVE     Pt reports: Pt reports that she felt pretty good after last visit. Has noticed an overall improvement.   She was compliant with home exercise program.  Response to previous treatment: reduction in elbow pain   Functional change: doing more at home     Pain: 1/10  Location: right elbow> left elbow    OBJECTIVE     Objective Measures updated at progress report unless specified.     Treatment     Cheryl received the treatments listed below:      Bold = Performed     Cheryl received the following manual therapy techniques: Myofacial release, Soft tissue Mobilization and Friction Massage were applied to the: right ue for 10 minutes, including:  Median nerve glide manual x1min  Ulnar nerve glide manual x1min  Radial nerve glide manual x1min   - Post Gliding, pt no longer had tingling in her fingers.   Manual passive pronation and supination   Radial rotation and AP mobilization bilateral   Ulnar distraction mobilization bilateral   HVLAT Hooklying Thoracic Region - cavitation noted      Pt was agreeable to dry needling by a certified dry  "needling PT and signed a consent prior to treatment after being made aware of risks.  · 30mm needles were inserted into lateral elbow right  · Electrical stimulation was applied to these needles at 2 Hz and a pulse of 250 µseconds for 15 minutes with a skilled nursing check. - NT  Afterwards, needles were removed and placed into a sharps container. Pt reported a good response to treatment.        Cheryl received therapeutic exercises to develop strength, endurance, ROM and posture for 50 minutes including:  UBE 3/3 - Lv 1  Scap retract over 1/2 foam in chair - 3x1min  Supine towel under thoracic region chest press c shoulder flexion - 3x10   Thoracic ext over small foam roller in chair - 10x10" holds  Seated pronation/supination 3# - 3x15  Seated wrist ext c 3# focus on eccentric control 3x15, 3#  Seated wrist radial deviation: 2x15, 3#  bilateral external rotation c elevation yellow theraband - 3x10  bilateral external rotation yellow theraband -3x1  Row c green theraband - 3x10 5" holds  Standing Resisted Y's: 2x10, YTB, 3" holds  Standing Resisted T's: 2x10, YTB, 3" holds    Prone internal rotation/ER 3x10  Prone middle trap 3x10  Prone Lower trap chicken wing - 3x10  Supported elbow wrist extension - 3x10 5" 1#  Manual wrist extensor stretch into wrist flexion - 15x5"   c blue digitflex 3x10 bilateral   Spiderweb finger ext - bilateral 3x10      Patient Education and Home Exercises     Home Exercises Provided and Patient Education Provided     Education provided:   - Pt educated on POC  - Pt educated on anatomy and physiology of current conditions as it relates to signs and symptoms  - Pt educated on HEP - provided new worksheet with nerve glides.     Written Home Exercises Provided: Patient instructed to cont prior HEP. Exercises were reviewed and Cheryl was able to demonstrate them prior to the end of the session.  Cheryl demonstrated good  understanding of the education provided. See EMR under Patient " Instructions for exercises provided during therapy sessions    ASSESSMENT     Pt presents to PT today with improving lateral elbow pain. More gross upper extemity exercises were added in today as nervous system continues to calm down. Pt does demonstrate postural strength deficits which were well changed with increasing activity today. No Dry needling on this day but can be added back in PRN.    Cheryl Is progressing well towards her goals.   Pt prognosis is Good.     Pt will continue to benefit from skilled outpatient physical therapy to address the deficits listed in the problem list box on initial evaluation, provide pt/family education and to maximize pt's level of independence in the home and community environment.     Pt's spiritual, cultural and educational needs considered and pt agreeable to plan of care and goals.     Anticipated barriers to physical therapy: chronicity    Goals:   Short Term Goals (4 Weeks):   1) 1. Pt will be compliant with initial HEP to supplement PT in decreasing pain with functional mobility.  2) Pt will reduce worst pain to 5/10 in order to perform work related tasks.   3) Pt will improve right elbow Flex/ext strength to 4+/5 in order to perform home related tasks with less pain  4) Pt will be able to work for 1 hour without any increase in pain greater than 2 pts on NPRS     Long Term Goals (8 Weeks):  1) Pt will be compliant with final HEP to reduce risk of further injury after dc.   2)Pt will improve right elbow Flex/ext strength to 5/5 in order to perform home related tasks with less pain  4) Pt will be able to work for 2 hour without any increase in pain greater than 2 pts on NPRS  4) Pt will improve FOTO limitation to 41% indicative of overall improvement in function.     PLAN     Continue with plan of care as indicated.     Naina Jefferson, PT, DPT, Cert. DN

## 2022-08-15 ENCOUNTER — CLINICAL SUPPORT (OUTPATIENT)
Dept: REHABILITATION | Facility: HOSPITAL | Age: 52
End: 2022-08-15
Attending: ORTHOPAEDIC SURGERY
Payer: COMMERCIAL

## 2022-08-15 DIAGNOSIS — M25.522 PAIN OF BOTH ELBOWS: Primary | ICD-10-CM

## 2022-08-15 DIAGNOSIS — R53.1 DECREASED STRENGTH: ICD-10-CM

## 2022-08-15 DIAGNOSIS — M25.521 PAIN OF BOTH ELBOWS: Primary | ICD-10-CM

## 2022-08-15 DIAGNOSIS — M79.10 TRIGGER POINT: ICD-10-CM

## 2022-08-15 PROCEDURE — 97110 THERAPEUTIC EXERCISES: CPT | Mod: PN

## 2022-08-15 PROCEDURE — 97140 MANUAL THERAPY 1/> REGIONS: CPT | Mod: PN

## 2022-08-15 NOTE — PROGRESS NOTES
[facility]   Physical Therapy Treatment Note     [id number]    Therapy Diagnosis:   Encounter Diagnoses   Name Primary?    Pain of both elbows Yes    Decreased strength     Trigger point      Physician: Jayashree Valencia MD    Visit Date: 8/15/2022  Physician Orders: PT Eval and Treat   Medical Diagnosis from Referral:   M77.12 (ICD-10-CM) - Lateral epicondylitis of left elbow   M77.11 (ICD-10-CM) - Lateral epicondylitis, right elbow   M25.511,G89.29 (ICD-10-CM) - Chronic right shoulder pain      Evaluation Date: 7/1/2022  Authorization Period Expiration: 12/31/2022  Plan of Care Expiration: 7/1/2022 to 8/26/2022  Visit # / Visits authorized: 7/20 + eval  FOTO#:obtained 8/11/2022     Time In: 5:30PM  Time Out: 6:30PM  Total Billable Time: 60 minutes (2MT, 2TE)     Precautions: Standard  SUBJECTIVE     Pt reports: Pt states that she has a lot of stuff going on right now. She did power wash her house yesterday but she feels like her elbows are not as bad as they were when she first started. She has some muscle soreness from doing her exercises at home.   She was compliant with home exercise program.  Response to previous treatment: reduction in elbow pain   Functional change: able to do more heavy duty housework    Pain: 1/10  Location: right elbow> left elbow    OBJECTIVE     Objective Measures updated at progress report unless specified.     Treatment     Cheryl received the treatments listed below:      Bold = Performed     Cheryl received the following manual therapy techniques: Myofacial release, Soft tissue Mobilization and Friction Massage were applied to the: right ue for 32 minutes, including:  Median nerve glide manual x1min  Ulnar nerve glide manual x1min  Radial nerve glide manual x1min   - Post Gliding, pt no longer had tingling in her fingers.   Manual passive pronation and supination   Radial rotation and AP mobilization  "bilateral   Ulnar distraction mobilization bilateral   HVLAT Hooklying Thoracic Region - cavitation noted      Pt was agreeable to dry needling by a certified dry needling PT and signed a consent prior to treatment after being made aware of risks.  · 30mm needles were inserted into lateral elbow right  · Electrical stimulation was applied to these needles at 2 Hz and a pulse of 250 µseconds for 15 minutes with a group home check. - NT  Afterwards, needles were removed and placed into a sharps container. Pt reported a good response to treatment.        Cheryl received therapeutic exercises to develop strength, endurance, ROM and posture for 28 minutes including:  UBE 3/3 - Lv 1  Scap retract over 1/2 foam in chair - 3x1min  Supine towel under thoracic region chest press c shoulder flexion - 3x10 3"  Thoracic ext over small foam roller in chair - 10x10" holds  Seated pronation/supination 3# - 3x15  Seated wrist ext c 3# focus on eccentric control 3x15, 3#,  Seated wrist radial deviation: 2x15, 3#  bilateral external rotation c elevation yellow theraband - 3x10  bilateral external rotation yellow theraband -3x10  Row c green theraband - 3x10 5" holds  Standing Resisted Y's: 2x10, YTB, 3" holds  Standing Resisted T's: 2x10, YTB, 3" holds    Prone internal rotation/ER 3x10  Prone middle trap 3x10  Prone Lower trap chicken wing - 3x10  Supported elbow wrist extension - 3x10 5" 1#  Manual wrist extensor stretch into wrist flexion - 15x5"   c blue digitflex 3x10 bilateral   Spiderweb finger ext - bilateral 3x10      Patient Education and Home Exercises     Home Exercises Provided and Patient Education Provided     Education provided:   - Pt educated on POC  - Pt educated on anatomy and physiology of current conditions as it relates to signs and symptoms  - Pt educated on HEP - provided new worksheet with nerve glides.     Written Home Exercises Provided: Patient instructed to cont prior HEP. Exercises were reviewed and " Cherly was able to demonstrate them prior to the end of the session.  Cheryl demonstrated good  understanding of the education provided. See EMR under Patient Instructions for exercises provided during therapy sessions    ASSESSMENT     Pt presents c gradually improving elbow pain. She continues to demonstrate postural deficits which may be contributing to overall sensitivity throughout the nervous system. Pt also has extensive personal concerns that may be contributing to adverse neural tension. Needling was reintroduced today and will slowly be weaned.     Cheryl Is progressing well towards her goals.   Pt prognosis is Good.     Pt will continue to benefit from skilled outpatient physical therapy to address the deficits listed in the problem list box on initial evaluation, provide pt/family education and to maximize pt's level of independence in the home and community environment.     Pt's spiritual, cultural and educational needs considered and pt agreeable to plan of care and goals.     Anticipated barriers to physical therapy: chronicity    Goals:   Short Term Goals (4 Weeks):   1) 1. Pt will be compliant with initial HEP to supplement PT in decreasing pain with functional mobility.  2) Pt will reduce worst pain to 5/10 in order to perform work related tasks.   3) Pt will improve right elbow Flex/ext strength to 4+/5 in order to perform home related tasks with less pain  4) Pt will be able to work for 1 hour without any increase in pain greater than 2 pts on NPRS     Long Term Goals (8 Weeks):  1) Pt will be compliant with final HEP to reduce risk of further injury after dc.   2)Pt will improve right elbow Flex/ext strength to 5/5 in order to perform home related tasks with less pain  4) Pt will be able to work for 2 hour without any increase in pain greater than 2 pts on NPRS  4) Pt will improve FOTO limitation to 41% indicative of overall improvement in function.     PLAN     Continue with plan of  care as indicated.     Naina Jefferson, PT, DPT, Cert. DN

## 2022-08-17 ENCOUNTER — CLINICAL SUPPORT (OUTPATIENT)
Dept: REHABILITATION | Facility: HOSPITAL | Age: 52
End: 2022-08-17
Attending: ORTHOPAEDIC SURGERY
Payer: COMMERCIAL

## 2022-08-17 DIAGNOSIS — M25.522 PAIN OF BOTH ELBOWS: Primary | ICD-10-CM

## 2022-08-17 DIAGNOSIS — M25.521 PAIN OF BOTH ELBOWS: Primary | ICD-10-CM

## 2022-08-17 DIAGNOSIS — R53.1 DECREASED STRENGTH: ICD-10-CM

## 2022-08-17 DIAGNOSIS — M79.10 TRIGGER POINT: ICD-10-CM

## 2022-08-17 PROCEDURE — 97140 MANUAL THERAPY 1/> REGIONS: CPT | Mod: PN

## 2022-08-17 PROCEDURE — 97110 THERAPEUTIC EXERCISES: CPT | Mod: PN

## 2022-08-17 NOTE — PROGRESS NOTES
[facility]   Physical Therapy Treatment Note     Name: Cheryl Huber  Clinic Number: [id number]    Therapy Diagnosis:   Encounter Diagnoses   Name Primary?    Pain of both elbows Yes    Decreased strength     Trigger point      Physician: Jayashree Valencia MD    Visit Date: 8/17/2022  Physician Orders: PT Eval and Treat   Medical Diagnosis from Referral:   M77.12 (ICD-10-CM) - Lateral epicondylitis of left elbow   M77.11 (ICD-10-CM) - Lateral epicondylitis, right elbow   M25.511,G89.29 (ICD-10-CM) - Chronic right shoulder pain      Evaluation Date: 7/1/2022  Authorization Period Expiration: 12/31/2022  Plan of Care Expiration: 7/1/2022 to 8/26/2022  Visit # / Visits authorized: 8/20 + eval  FOTO#:obtained 8/11/2022     Time In: 3:30PM  Time Out: 4:30PM  Total Billable Time: 60 minutes (1MT, 3TE)     Precautions: Standard  SUBJECTIVE     Pt reports: 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 She is still a little sore from the needling done the other day.   She was compliant with home exercise program.  Response to previous treatment: a little sore  Functional change:    Pain: 1/10  Location: right elbow> left elbow    OBJECTIVE     Objective Measures updated at progress report unless specified.     Treatment     Cheryl received the treatments listed below:      Bold = Performed     Cheryl received the following manual therapy techniques: Myofacial release, Soft tissue Mobilization and Friction Massage were applied to the: right ue for 13 minutes, including:  Median nerve glide manual x1min  Ulnar nerve glide manual x1min  Radial nerve glide manual x1min   - Post Gliding, pt no longer had tingling in her fingers.   Manual passive pronation and supination   Radial rotation and AP mobilization bilateral   Ulnar distraction mobilization bilateral   HVLAT Hooklying Thoracic Region - cavitation noted      Pt was agreeable to dry needling by a certified  "dry needling PT and signed a consent prior to treatment after being made aware of risks.  · 30mm needles were inserted into lateral elbow right  · Electrical stimulation was applied to these needles at 2 Hz and a pulse of 250 µseconds for 15 minutes with a assisted check. - NT  Afterwards, needles were removed and placed into a sharps container. Pt reported a good response to treatment.        Cheryl received therapeutic exercises to develop strength, endurance, ROM and posture for 47 minutes including:  UBE 3/3 - Lv 2  Seated wrist extensor isometrics c 4# - 6x1min c 1min break   Supine towel under thoracic region chest press c shoulder flexion - 3x10 3"  Thoracic ext over small foam roller in chair - 10x10" holds  Seated pronation/supination 3# - 3x15  Seated wrist ext c 3# focus on eccentric control 3x15, 3#,  Seated wrist radial deviation: 2x15, 3#  bilateral external rotation c elevation yellow theraband - 3x10  bilateral external rotation yellow theraband -3x10  Row c green theraband - 3x10 5" holds  Standing Resisted Y's: 2x10, YTB, 3" holds  Standing Resisted T's: 2x10, YTB, 3" holds  Scap retract over 1/2 foam in chair - 3x1min    Prone internal rotation/ER 3x10  Prone middle trap 3x10  Prone Lower trap chicken wing - 3x10  Supported elbow wrist extension - 3x10 5" 1#  Manual wrist extensor stretch into wrist flexion - 15x5"   c blue digitflex 3x10 bilateral   Spiderweb finger ext - bilateral 3x10      Patient Education and Home Exercises     Home Exercises Provided and Patient Education Provided     Education provided:   - Pt educated on POC  - Pt educated on anatomy and physiology of current conditions as it relates to signs and symptoms  - Pt educated on HEP - provided new worksheet with nerve glides.     Written Home Exercises Provided: Patient instructed to cont prior HEP. Exercises were reviewed and Cheryl was able to demonstrate them prior to the end of the session.  Cheryl demonstrated " good  understanding of the education provided. See EMR under Patient Instructions for exercises provided during therapy sessions    ASSESSMENT     Pt continues to present with lateral elbow pain along extensor compartment. Pt has been making functional gains and focus today was on overall upper extremity motion and strength to improve overall tension throughout the upper quarter. Isometric exercises added today to reduce pain. Pt self reported a reduction in pain after tx today.     Cheryl Is progressing well towards her goals.   Pt prognosis is Good.     Pt will continue to benefit from skilled outpatient physical therapy to address the deficits listed in the problem list box on initial evaluation, provide pt/family education and to maximize pt's level of independence in the home and community environment.     Pt's spiritual, cultural and educational needs considered and pt agreeable to plan of care and goals.     Anticipated barriers to physical therapy: chronicity    Goals:   Short Term Goals (4 Weeks):   1) 1. Pt will be compliant with initial HEP to supplement PT in decreasing pain with functional mobility.  2) Pt will reduce worst pain to 5/10 in order to perform work related tasks.   3) Pt will improve right elbow Flex/ext strength to 4+/5 in order to perform home related tasks with less pain  4) Pt will be able to work for 1 hour without any increase in pain greater than 2 pts on NPRS     Long Term Goals (8 Weeks):  1) Pt will be compliant with final HEP to reduce risk of further injury after dc.   2)Pt will improve right elbow Flex/ext strength to 5/5 in order to perform home related tasks with less pain  4) Pt will be able to work for 2 hour without any increase in pain greater than 2 pts on NPRS  4) Pt will improve FOTO limitation to 41% indicative of overall improvement in function.     PLAN     Continue with plan of care as indicated.     Naina Jefferson, PT, DPT, Cert. DN

## 2022-08-18 ENCOUNTER — OFFICE VISIT (OUTPATIENT)
Dept: ORTHOPEDICS | Facility: CLINIC | Age: 52
End: 2022-08-18
Payer: COMMERCIAL

## 2022-08-18 VITALS
BODY MASS INDEX: 37.47 KG/M2 | OXYGEN SATURATION: 98 % | DIASTOLIC BLOOD PRESSURE: 60 MMHG | RESPIRATION RATE: 18 BRPM | SYSTOLIC BLOOD PRESSURE: 118 MMHG | WEIGHT: 198.44 LBS | HEIGHT: 61 IN | HEART RATE: 81 BPM

## 2022-08-18 DIAGNOSIS — M77.11 LATERAL EPICONDYLITIS, RIGHT ELBOW: ICD-10-CM

## 2022-08-18 DIAGNOSIS — M25.561 ACUTE PAIN OF RIGHT KNEE: Primary | ICD-10-CM

## 2022-08-18 DIAGNOSIS — M77.12 LATERAL EPICONDYLITIS OF LEFT ELBOW: ICD-10-CM

## 2022-08-18 PROCEDURE — 99213 PR OFFICE/OUTPT VISIT, EST, LEVL III, 20-29 MIN: ICD-10-PCS | Mod: S$GLB,,, | Performed by: ORTHOPAEDIC SURGERY

## 2022-08-18 PROCEDURE — 3044F HG A1C LEVEL LT 7.0%: CPT | Mod: CPTII,S$GLB,, | Performed by: ORTHOPAEDIC SURGERY

## 2022-08-18 PROCEDURE — 1160F RVW MEDS BY RX/DR IN RCRD: CPT | Mod: CPTII,S$GLB,, | Performed by: ORTHOPAEDIC SURGERY

## 2022-08-18 PROCEDURE — 3074F SYST BP LT 130 MM HG: CPT | Mod: CPTII,S$GLB,, | Performed by: ORTHOPAEDIC SURGERY

## 2022-08-18 PROCEDURE — 1159F MED LIST DOCD IN RCRD: CPT | Mod: CPTII,S$GLB,, | Performed by: ORTHOPAEDIC SURGERY

## 2022-08-18 PROCEDURE — 1160F PR REVIEW ALL MEDS BY PRESCRIBER/CLIN PHARMACIST DOCUMENTED: ICD-10-PCS | Mod: CPTII,S$GLB,, | Performed by: ORTHOPAEDIC SURGERY

## 2022-08-18 PROCEDURE — 3008F PR BODY MASS INDEX (BMI) DOCUMENTED: ICD-10-PCS | Mod: CPTII,S$GLB,, | Performed by: ORTHOPAEDIC SURGERY

## 2022-08-18 PROCEDURE — 3078F DIAST BP <80 MM HG: CPT | Mod: CPTII,S$GLB,, | Performed by: ORTHOPAEDIC SURGERY

## 2022-08-18 PROCEDURE — 1159F PR MEDICATION LIST DOCUMENTED IN MEDICAL RECORD: ICD-10-PCS | Mod: CPTII,S$GLB,, | Performed by: ORTHOPAEDIC SURGERY

## 2022-08-18 PROCEDURE — 3008F BODY MASS INDEX DOCD: CPT | Mod: CPTII,S$GLB,, | Performed by: ORTHOPAEDIC SURGERY

## 2022-08-18 PROCEDURE — 3078F PR MOST RECENT DIASTOLIC BLOOD PRESSURE < 80 MM HG: ICD-10-PCS | Mod: CPTII,S$GLB,, | Performed by: ORTHOPAEDIC SURGERY

## 2022-08-18 PROCEDURE — 99213 OFFICE O/P EST LOW 20 MIN: CPT | Mod: S$GLB,,, | Performed by: ORTHOPAEDIC SURGERY

## 2022-08-18 PROCEDURE — 99999 PR PBB SHADOW E&M-EST. PATIENT-LVL IV: CPT | Mod: PBBFAC,,, | Performed by: ORTHOPAEDIC SURGERY

## 2022-08-18 PROCEDURE — 99999 PR PBB SHADOW E&M-EST. PATIENT-LVL IV: ICD-10-PCS | Mod: PBBFAC,,, | Performed by: ORTHOPAEDIC SURGERY

## 2022-08-18 PROCEDURE — 3044F PR MOST RECENT HEMOGLOBIN A1C LEVEL <7.0%: ICD-10-PCS | Mod: CPTII,S$GLB,, | Performed by: ORTHOPAEDIC SURGERY

## 2022-08-18 PROCEDURE — 3074F PR MOST RECENT SYSTOLIC BLOOD PRESSURE < 130 MM HG: ICD-10-PCS | Mod: CPTII,S$GLB,, | Performed by: ORTHOPAEDIC SURGERY

## 2022-08-18 NOTE — PROGRESS NOTES
Orthopedic Surgery Progress Note     Assessment: 51 y.o. female with bilateral lateral epicondylitis     I explained my diagnostic impression and the reasoning behind it in detail, using layman's terms.      Plan:   - Continue PT  - Continue bracing, stretching, activity modification  - MRI knee - suspect possible bucket hand tear of the meniscus   - Return to clinic in 12 weeks. Return sooner if symptoms worsen or fail to improve.    All questions were answered in detail. The patient is in full agreement with the treatment plan and will proceed accordingly.    Chief Complaint   Patient presents with    Right Elbow - Pain      Initial visit (5/26/22): Cheryl Espinosa is a 51 y.o. female who presents today complaining of Pain of the Right Elbow     Duration of symptoms: about 5 months. She thinks she has had it on and off over the years but thought it was associated with carpal tunnel   Trauma or new activity: no, maybe she was cleaning out her house around the time that it started  Pain is constant   Is pointing to her shoulder as site of pain with radiation down the arm - this is worse at night   Pain is the worse over the lateral elbow   Aggravating factors: worse at night (wakes her up), does have daytime pain with activity. Worse if she is not moving for long periods of time   Has pain in the shoulder and elbow with reaching overhead, reaching behind her back   Relieving factors: rest   Radicular symptoms:occ neck pain  Has n/t down the entire arm into the hands. Has been treated for carpal tunnel in the past with Dr. Barraza. Has had multiple injections.   Prior treatment:  prescription NSAIDs with improvement in pain.   Has not done wrist braces. Has tried doing some stretches of the shoulders at home.  [place] gave her a HEP which has not been doing for the last three weeks. She recently started a new job and has been stressed about it.   Pain does interfere with sleep and activities of daily living  ".  Does have some pain in both arms but worse on the right     8/18/22  Pain over lateral elbow mostly resolved - sometimes has pain if she carries something or does a lot of activity   Now has some pain in the extensor muscle belly - PT thinks this is related to muscle soreness as she is able to increase activity. I agree this is most likely   Now pain I always below 5   Not at her stressful job anymore     Knee is episodic, usually starts with flexion of the knee. Is unable to bear weight on the knee or straighten it out   Better with stretching, resting for a moment and then it goes away    This is the extent of the patient's complaints at this time.     Hand dominance: Desk worker   Has ergonomic set up at work        Review of patient's allergies indicates:   Allergen Reactions    Doxycycline Anaphylaxis, Nausea And Vomiting and Other (See Comments)    Medrol [methylprednisolone] Shortness Of Breath and Rash    Nsaids (non-steroidal anti-inflammatory drug) Nausea And Vomiting     Nausea          Current Outpatient Medications:     diclofenac (VOLTAREN) 75 MG EC tablet, Take 1 tablet (75 mg total) by mouth 2 (two) times daily as needed (back pain)., Disp: 60 tablet, Rfl: 2    omeprazole (PRILOSEC) 40 MG capsule, Take 1 capsule (40 mg total) by mouth once daily., Disp: 30 capsule, Rfl: 11    VALIUM 5 mg tablet, Take 1 tablet (5 mg total) by mouth every 12 (twelve) hours as needed (back pain)., Disp: 60 tablet, Rfl: 1    Physical Exam:   Vitals:    08/18/22 0855   BP: 118/60   Pulse: 81   Resp: 18   SpO2: 98%   Weight: 90 kg (198 lb 6.6 oz)   Height: 5' 1" (1.549 m)   PainSc:   4   PainLoc: Elbow     General: Weight: 90 kg (198 lb 6.6 oz) Body mass index is 37.49 kg/m².  Patient is alert, awake and oriented to time, place and person. Mood and affect are appropriate.  Patient does not appear to be in any distress, denies any constitutional symptoms and appears stated age.   HEENT:  Pupils are equal and round, " sclera are not injected. External examination of ears and nose reveals no abnormalities. Cranial nerves II-X are grossly intact  Skin: no rashes, abrasions or open wounds on the affected extremity   Resp:  No respiratory distress or audible wheezing   CV: 2+  pulses, all extremities warm and well perfused   Left and Right Upper extremity    Minimal pain with shoulder testing - full painless ROM   Good strength with cuff testing  Pain over ECRB bilaterally, much more tender on R than L   Pain with resisted wrist and digit extension   Does report numbness on R with tinels over PIN but not in radial n dist - reports numbness in median n dist   No numbness/tingling with tinels over PIN on L   LTSI m/u/r  2+ RP  + EPL, IO, FDS, FDP     Right knee   TTP over the medial joint line   + mcmurrays  No erythema, warmth   Ltsi s/s/sp/dp/t  + ehl/fhl/ta/gs  2+ DP     Imaging: 3 views right/left elbows and right hand negative for degenerative changes, fracture     I personally reviewed and interpreted the patient's imaging obtained today in clinic      This note was created by combination of typed  and M-Modal dictation. Transcription and phonetic errors may be present.  If there are any questions, please contact me.    Past Medical History:   Diagnosis Date    Carpal tunnel syndrome     GERD (gastroesophageal reflux disease)     History of degenerative disc disease     Metatarsalgia        Active Problem List with Overview Notes    Diagnosis Date Noted    Pain of both elbows 07/01/2022    Decreased strength 07/01/2022    Trigger point 07/01/2022    Chronic pain of right knee 10/06/2021    Chronic bilateral low back pain with bilateral sciatica 08/12/2021    Decreased range of motion of intervertebral discs of lumbar spine 08/12/2021    Weakness of both lower extremities 08/12/2021    Posture imbalance 08/12/2021    Osteopenia 08/05/2016     From depo-provera.  Needs DEXA scan every 3 years.       History  of endometriosis 08/05/2016     See Dr. Herring to complete colonoscopy as she had extensive endometriosis that involved her colon     Located in uterus, ovaries, colon.       GERD without esophagitis 08/05/2016       No past surgical history on file.    Family History   Problem Relation Age of Onset    Breast cancer Maternal Aunt     Breast cancer Maternal Grandmother

## 2022-08-22 ENCOUNTER — CLINICAL SUPPORT (OUTPATIENT)
Dept: REHABILITATION | Facility: HOSPITAL | Age: 52
End: 2022-08-22
Attending: ORTHOPAEDIC SURGERY
Payer: COMMERCIAL

## 2022-08-22 DIAGNOSIS — M25.521 PAIN OF BOTH ELBOWS: Primary | ICD-10-CM

## 2022-08-22 DIAGNOSIS — R53.1 DECREASED STRENGTH: ICD-10-CM

## 2022-08-22 DIAGNOSIS — M79.10 TRIGGER POINT: ICD-10-CM

## 2022-08-22 DIAGNOSIS — M25.522 PAIN OF BOTH ELBOWS: Primary | ICD-10-CM

## 2022-08-22 PROCEDURE — 97110 THERAPEUTIC EXERCISES: CPT | Mod: PN

## 2022-08-22 PROCEDURE — 97140 MANUAL THERAPY 1/> REGIONS: CPT | Mod: PN

## 2022-08-22 NOTE — PLAN OF CARE
Outpatient Therapy Updated Plan of Care     Visit Date: 8/22/2022  Name: Cheryl Espinosa  Clinic Number: [id number]    Therapy Diagnosis:   Encounter Diagnoses   Name Primary?    Pain of both elbows Yes    Decreased strength     Trigger point      Physician: Jayashree Valencia MD    Physician Orders: PT Eval and Treat   Medical Diagnosis from Referral:   M77.12 (ICD-10-CM) - Lateral epicondylitis of left elbow   M77.11 (ICD-10-CM) - Lateral epicondylitis, right elbow   M25.511,G89.29 (ICD-10-CM) - Chronic right shoulder pain      Evaluation Date: 7/1/2022  Authorization Period Expiration: 12/31/2022  Plan of Care Expiration: 7/1/2022 to 8/26/2022  Visit # / Visits authorized: 9/20 + eval  FOTO#:obtained 8/11/2022        Subjective     Update: Pt reports: Pt reports that she saw her doctor and told her that she is doing so much better. She still has some pain but thinks that what she is doing is working overall. She is having trouble with progressing home exercise program.   She was compliant with home exercise program.  Response to previous treatment: a little sore  Functional change:     Pain: 1/10  Location: right elbow> left elbow    Objective     Update:        U/E MMT Right Left Pain/Dysfunction with Movement   Shoulder Flexion 4/5 4+/5     Shoulder Extension 4+/5 4+/5     Shoulder Abduction 4+/5 4+/5     Shoulder Adduction 5/5 5/5     Shoulder IR 4+/5 4+/5     Shoulder ER  @ 0* Abduction 4+/5 5/5     Shoulder ER  @ 90* Abduction NT NT     Elbow Flexion  4+/5 4+/5     Elbow Extension 4+/5 4+/5     Rhomboids 4-/5 4/5     Mid Traps 4/5 4/5     Low Traps 4-/5 4-/5         Strength:  Right: 17kgF  Left : 20kgF     Joint Mobility:   - Elbow: good mobility of the radius on ulna proximally. Not flexion/extension mobility deficits  - Shoulder - to be assessed at upcoming visit if needed  - Wrist - pain with ovepressure into flexion, no pain with passive extenions.      Special Tests:   - Coto  +  Cozens  -Middle finger resisted extension  - pain with active supination and passive pronation  + Phalans, Reverse Phalans    Assessment     Update: Pt presents to PT today with improvements in discomfort. She was progressed in strengthening today as pt was struggling to figure out how to progress at home. She did well with progression but did fatigue. HVLAT was performed to thoracic region with some relief as well. Pt continues to have slight functional deficits in  and upper extremity strength that are impacting her daily life. Pt would benefit from continued skilled PT to address these deficits.     Cheryl Is progressing well towards her goals.   Pt prognosis is Good.     Pt will continue to benefit from skilled outpatient physical therapy to address the deficits listed in the problem list box on initial evaluation, provide pt/family education and to maximize pt's level of independence in the home and community environment.     Pt's spiritual, cultural and educational needs considered and pt agreeable to plan of care and goals.     Anticipated barriers to physical therapy: chronicity    Goals:   Short Term Goals (4 Weeks):   1) 1. Pt will be compliant with initial HEP to supplement PT in decreasing pain with functional mobility. - met  2) Pt will reduce worst pain to 5/10 in order to perform work related tasks. - met  3) Pt will improve right elbow Flex/ext strength to 4+/5 in order to perform home related tasks with less pain - met  4) Pt will be able to work for 1 hour without any increase in pain greater than 2 pts on NPRS - met    Previous Short Term Goals Status:   See above  New Short Term Goals Status:   No new STG  Long Term Goal Status:   continue per initial plan of care.  Reasons for Recertification of Therapy:   Functional upper extremity deficits     Plan     Updated Certification Period: 8/22/2022 to 9/16/2022  Recommended Treatment Plan: 2 times per week for 3 weeks: Cervical/Lumbar  Traction, Electrical Stimulation PRN, Manual Therapy, Moist Heat/ Ice, Neuromuscular Re-ed, Patient Education, Therapeutic Activities and Therapeutic Exercise,Dry Needling PRN. Treated as appropriate by PTA.    Naina Jefferson, PT  8/22/2022      I CERTIFY THE NEED FOR THESE SERVICES FURNISHED UNDER THIS PLAN OF TREATMENT AND WHILE UNDER MY CARE    Physician's comments:        Physician's Signature: ___________________________________________________

## 2022-08-22 NOTE — PROGRESS NOTES
[facility]   Physical Therapy Treatment Note     Name: Cheryl Espinosa  [facility] Number: [id number]    Therapy Diagnosis:   Encounter Diagnoses   Name Primary?    Pain of both elbows Yes    Decreased strength     Trigger point      Physician: Jayashree Valencia MD    Visit Date: 8/22/2022  Physician Orders: PT Eval and Treat   Medical Diagnosis from Referral:   M77.12 (ICD-10-CM) - Lateral epicondylitis of left elbow   M77.11 (ICD-10-CM) - Lateral epicondylitis, right elbow   M25.511,G89.29 (ICD-10-CM) - Chronic right shoulder pain      Evaluation Date: 7/1/2022  Authorization Period Expiration: 12/31/2022  Plan of Care Expiration: 7/1/2022 to 8/26/2022  Visit # / Visits authorized: 9/20 + eval  FOTO#:obtained 8/11/2022     Time In: 12:00PM  Time Out: 1:00PM  Total Billable Time: 56 minutes (1MT, 3TE)     Precautions: Standard  SUBJECTIVE     Pt reports: Pt reports that she saw her doctor and told her that she is doing so much better. She still has some pain but thinks that what she is doing is working overall. She is having trouble with progressing home exercise program.   She was compliant with home exercise program.  Response to previous treatment: a little sore  Functional change:    Pain: 1/10  Location: right elbow> left elbow    OBJECTIVE     Objective Measures updated at progress report unless specified.        U/E MMT Right Left Pain/Dysfunction with Movement   Shoulder Flexion 4/5 4+/5     Shoulder Extension 4+/5 4+/5     Shoulder Abduction 4+/5 4+/5     Shoulder Adduction 5/5 5/5     Shoulder IR 4+/5 4+/5     Shoulder ER  @ 0* Abduction 4+/5 5/5     Shoulder ER  @ 90* Abduction NT NT     Elbow Flexion  4+/5 4+/5     Elbow Extension 4+/5 4+/5     Rhomboids 4-/5 4/5     Mid Traps 4/5 4/5     Low Traps 4-/5 4-/5         Strength:  Right: 17kgF  Left : 20kgF     Joint Mobility:   - Elbow: good mobility of the radius on ulna proximally. Not flexion/extension mobility  "deficits  - Shoulder - to be assessed at upcoming visit if needed  - Wrist - pain with ovepressure into flexion, no pain with passive extenions.      Special Tests:   - Coto  + Cozens  -Middle finger resisted extension  - pain with active supination and passive pronation  + Phalans, Reverse Phalans       Treatment     Cheryl received the treatments listed below:      Bold = Performed     Cheryl received the following manual therapy techniques: Myofacial release, Soft tissue Mobilization and Friction Massage were applied to the: right ue for 18 minutes, including tests and measures above:  Median nerve glide manual x1min  Ulnar nerve glide manual x1min  Radial nerve glide manual x1min   - Post Gliding, pt no longer had tingling in her fingers.   Manual passive pronation and supination   Radial rotation and AP mobilization bilateral   Ulnar distraction mobilization bilateral   HVLAT Hooklying Thoracic Region - cavitation noted      Pt was agreeable to dry needling by a certified dry needling PT and signed a consent prior to treatment after being made aware of risks.  · 30mm needles were inserted into lateral elbow right  · Electrical stimulation was applied to these needles at 2 Hz and a pulse of 250 µseconds for 15 minutes with a skilled nursing check. - NT  Afterwards, needles were removed and placed into a sharps container. Pt reported a good response to treatment.        Cheryl received therapeutic exercises to develop strength, endurance, ROM and posture for 36 minutes including tests and measures above:    Seated wrist extensor isometrics c 4# - 6x1min c 1min break   Supine towel under thoracic region chest press c shoulder flexion - 3x10 3" 5#  Supine SA punch c 5# - 3x10  bilateral external rotation c elevation red theraband - 2x8  bilateral external rotation red theraband - 2x8    UBE 3/3 - Lv 2  Row c green theraband - 3x10 5" holds  Standing Resisted Y's: 2x10, YTB, 3" holds  Standing Resisted T's: 2x10, " "YTB, 3" holds  Scap retract over 1/2 foam in chair - 3x1min  Thoracic ext over small foam roller in chair - 10x10" holds  Seated pronation/supination 3# - 3x15  Seated wrist ext c 3# focus on eccentric control 3x15, 3#,  Seated wrist radial deviation: 2x15, 3#    Prone internal rotation/ER 3x10  Prone middle trap 3x10  Prone Lower trap chicken wing - 3x10  Supported elbow wrist extension - 3x10 5" 1#  Manual wrist extensor stretch into wrist flexion - 15x5"   c blue digitflex 3x10 bilateral   Spiderweb finger ext - bilateral 3x10      Patient Education and Home Exercises     Home Exercises Provided and Patient Education Provided     Education provided:   - Pt educated on POC  - Pt educated on anatomy and physiology of current conditions as it relates to signs and symptoms  - Pt educated on HEP - provided new worksheet with nerve glides.     Written Home Exercises Provided: Patient instructed to cont prior HEP. Exercises were reviewed and Cheryl was able to demonstrate them prior to the end of the session.  Cheryl demonstrated good  understanding of the education provided. See EMR under Patient Instructions for exercises provided during therapy sessions    ASSESSMENT       See updated plan of care for details     PLAN     See updated plan of care for details.     Naina Jefferson, PT, DPT, Cert. DN         "

## 2022-08-24 ENCOUNTER — CLINICAL SUPPORT (OUTPATIENT)
Dept: REHABILITATION | Facility: HOSPITAL | Age: 52
End: 2022-08-24
Attending: ORTHOPAEDIC SURGERY
Payer: COMMERCIAL

## 2022-08-24 DIAGNOSIS — M25.521 PAIN OF BOTH ELBOWS: Primary | ICD-10-CM

## 2022-08-24 DIAGNOSIS — M79.10 TRIGGER POINT: ICD-10-CM

## 2022-08-24 DIAGNOSIS — M25.522 PAIN OF BOTH ELBOWS: Primary | ICD-10-CM

## 2022-08-24 DIAGNOSIS — R53.1 DECREASED STRENGTH: ICD-10-CM

## 2022-08-24 PROCEDURE — 97110 THERAPEUTIC EXERCISES: CPT | Mod: PN

## 2022-08-24 NOTE — PROGRESS NOTES
[facility]ERAP[facility] AND [facility]   Physical Therapy Treatment Note     Name: Cheryl Jones [facility] Number: [id number]    Therapy Diagnosis:   Encounter Diagnoses   Name Primary?    Pain of both elbows Yes    Decreased strength     Trigger point      Physician: Jayashree Valencia MD    Visit Date: 8/24/2022  Physician Orders: PT Eval and Treat   Medical Diagnosis from Referral:   M77.12 (ICD-10-CM) - Lateral epicondylitis of left elbow   M77.11 (ICD-10-CM) - Lateral epicondylitis, right elbow   M25.511,G89.29 (ICD-10-CM) - Chronic right shoulder pain      Evaluation Date: 7/1/2022  Authorization Period Expiration: 12/31/2022  Plan of Care Expiration: 7/1/2022 to 8/26/2022  Visit # / Visits authorized: 10/20 + eval  FOTO#:obtained 8/11/2022     Time In: 4:15PM  Time Out: 5:10PM  Total Billable Time: 55 minutes (4TE)     Precautions: Standard  SUBJECTIVE     Pt reports: Pt reports that she is feeling good today. Her lower body is a bit sore from doing a lot of her exercises at home and going for walks. But she did not do her arm exercise today knowing she was coming to PT.  She was compliant with home exercise program.  Response to previous treatment: a little sore  Functional change:    Pain: 1/10  Location: right elbow> left elbow    OBJECTIVE     Objective Measures updated at progress report unless specified.      Treatment     Cheryl received the treatments listed below:      Bold = Performed     Cheryl received the following manual therapy techniques: Myofacial release, Soft tissue Mobilization and Friction Massage were applied to the: right ue for 18 minutes, including tests and measures above:  Median nerve glide manual x1min  Ulnar nerve glide manual x1min  Radial nerve glide manual x1min   - Post Gliding, pt no longer had tingling in her fingers.   Manual passive pronation and supination   Radial rotation and AP mobilization bilateral   Ulnar distraction mobilization bilateral   HVLAT  "Hooklying Thoracic Region - cavitation noted      Pt was agreeable to dry needling by a certified dry needling PT and signed a consent prior to treatment after being made aware of risks.  · 30mm needles were inserted into lateral elbow right  · Electrical stimulation was applied to these needles at 2 Hz and a pulse of 250 µseconds for 15 minutes with a jail check. - NT  Afterwards, needles were removed and placed into a sharps container. Pt reported a good response to treatment.        Cheryl received therapeutic exercises to develop strength, endurance, ROM and posture for 55 minutes including tests and measures above:    UBE 3/3 - Lv 1  Seated wrist extensor isometrics c 4# - 6x1min c 1min break   Supine towel under thoracic region chest press c shoulder flexion - 3x10 3" 5#  Supine SA punch c 5# - 3x10  bilateral external rotation c elevation red theraband - 3x8  bilateral external rotation red theraband - 3x8  Standing Resisted Y's: 3x8, RTB, 3" holds      Row c green theraband - 3x10 5" holds    Standing Resisted T's: 2x10, YTB, 3" holds  Scap retract over 1/2 foam in chair - 3x1min  Thoracic ext over small foam roller in chair - 10x10" holds  Seated pronation/supination 3# - 3x15  Seated wrist ext c 3# focus on eccentric control 3x15, 3#,  Seated wrist radial deviation: 2x15, 3#  Prone internal rotation/ER 3x10  Prone middle trap 3x10  Prone Lower trap chicken wing - 3x10  Supported elbow wrist extension - 3x10 5" 1#  Manual wrist extensor stretch into wrist flexion - 15x5"   c blue digitflex 3x10 bilateral   Spiderweb finger ext - bilateral 3x10      Patient Education and Home Exercises     Home Exercises Provided and Patient Education Provided     Education provided:   - Pt educated on POC  - Pt educated on anatomy and physiology of current conditions as it relates to signs and symptoms  - Pt educated on HEP - provided new worksheet with nerve glides.     Written Home Exercises Provided: Patient " instructed to cont prior HEP. Exercises were reviewed and Cheryl was able to demonstrate them prior to the end of the session.  Cheryl demonstrated good  understanding of the education provided. See EMR under Patient Instructions for exercises provided during therapy sessions    ASSESSMENT   Pt presents to PT today with reduce elbow symptoms. PT has primarily been focusing on generalized upper body strengthening and neural mobility with more functional tasks. Pt will occasionally have discomfort but overall pain is reduced. Pt continues to demonstrate reduced postural strength which has become more of a focus of therapy in order to place neural mobility in proper positions.     Cheryl Is progressing well towards her goals.   Pt prognosis is Good.      Pt will continue to benefit from skilled outpatient physical therapy to address the deficits listed in the problem list box on initial evaluation, provide pt/family education and to maximize pt's level of independence in the home and community environment.      Pt's spiritual, cultural and educational needs considered and pt agreeable to plan of care and goals.     Anticipated barriers to physical therapy: chronicity     Goals:   Short Term Goals (4 Weeks):   1) 1. Pt will be compliant with initial HEP to supplement PT in decreasing pain with functional mobility. - met  2) Pt will reduce worst pain to 5/10 in order to perform work related tasks. - met  3) Pt will improve right elbow Flex/ext strength to 4+/5 in order to perform home related tasks with less pain - met  4) Pt will be able to work for 1 hour without any increase in pain greater than 2 pts on NPRS - met  PLAN     Continue with current pOC.     Naina Jefferson, PT, DPT, Cert. DN

## 2022-08-27 ENCOUNTER — HOSPITAL ENCOUNTER (OUTPATIENT)
Dept: RADIOLOGY | Facility: HOSPITAL | Age: 52
Discharge: HOME | End: 2022-08-27
Attending: ORTHOPAEDIC SURGERY
Payer: COMMERCIAL

## 2022-08-27 DIAGNOSIS — M25.561 ACUTE PAIN OF RIGHT KNEE: ICD-10-CM

## 2022-08-27 PROCEDURE — 73721 MRI KNEE WITHOUT CONTRAST RIGHT: ICD-10-PCS | Mod: 26,RT,, | Performed by: RADIOLOGY

## 2022-08-27 PROCEDURE — 73721 MRI JNT OF LWR EXTRE W/O DYE: CPT | Mod: TC,RT

## 2022-08-27 PROCEDURE — 73721 MRI JNT OF LWR EXTRE W/O DYE: CPT | Mod: 26,RT,, | Performed by: RADIOLOGY

## 2022-09-06 ENCOUNTER — PATIENT MESSAGE (OUTPATIENT)
Dept: ORTHOPEDICS | Facility: CLINIC | Age: 52
End: 2022-09-06
Payer: COMMERCIAL

## 2022-09-06 ENCOUNTER — PATIENT MESSAGE (OUTPATIENT)
Dept: FAMILY MEDICINE | Facility: CLINIC | Age: 52
End: 2022-09-06
Payer: COMMERCIAL

## 2022-09-07 ENCOUNTER — HOSPITAL ENCOUNTER (OUTPATIENT)
Dept: RADIOLOGY | Facility: HOSPITAL | Age: 52
Discharge: HOME | End: 2022-09-07
Attending: FAMILY MEDICINE
Payer: COMMERCIAL

## 2022-09-07 ENCOUNTER — OFFICE VISIT (OUTPATIENT)
Dept: FAMILY MEDICINE | Facility: CLINIC | Age: 52
End: 2022-09-07
Payer: COMMERCIAL

## 2022-09-07 VITALS
HEIGHT: 61 IN | TEMPERATURE: 98 F | HEART RATE: 77 BPM | SYSTOLIC BLOOD PRESSURE: 120 MMHG | DIASTOLIC BLOOD PRESSURE: 72 MMHG | BODY MASS INDEX: 37.63 KG/M2 | WEIGHT: 199.31 LBS | OXYGEN SATURATION: 98 %

## 2022-09-07 DIAGNOSIS — M62.838 MUSCLE SPASMS OF NECK: ICD-10-CM

## 2022-09-07 DIAGNOSIS — M62.838 MUSCLE SPASMS OF NECK: Primary | ICD-10-CM

## 2022-09-07 PROCEDURE — 72040 X-RAY EXAM NECK SPINE 2-3 VW: CPT | Mod: 26,,, | Performed by: RADIOLOGY

## 2022-09-07 PROCEDURE — 72040 XR CERVICAL SPINE AP LATERAL: ICD-10-PCS | Mod: 26,,, | Performed by: RADIOLOGY

## 2022-09-07 PROCEDURE — 99214 PR OFFICE/OUTPT VISIT, EST, LEVL IV, 30-39 MIN: ICD-10-PCS | Mod: S$GLB,,, | Performed by: FAMILY MEDICINE

## 2022-09-07 PROCEDURE — 3074F PR MOST RECENT SYSTOLIC BLOOD PRESSURE < 130 MM HG: ICD-10-PCS | Mod: CPTII,S$GLB,, | Performed by: FAMILY MEDICINE

## 2022-09-07 PROCEDURE — 1160F PR REVIEW ALL MEDS BY PRESCRIBER/CLIN PHARMACIST DOCUMENTED: ICD-10-PCS | Mod: CPTII,S$GLB,, | Performed by: FAMILY MEDICINE

## 2022-09-07 PROCEDURE — 1160F RVW MEDS BY RX/DR IN RCRD: CPT | Mod: CPTII,S$GLB,, | Performed by: FAMILY MEDICINE

## 2022-09-07 PROCEDURE — 99999 PR PBB SHADOW E&M-EST. PATIENT-LVL III: ICD-10-PCS | Mod: PBBFAC,,, | Performed by: FAMILY MEDICINE

## 2022-09-07 PROCEDURE — 3008F PR BODY MASS INDEX (BMI) DOCUMENTED: ICD-10-PCS | Mod: CPTII,S$GLB,, | Performed by: FAMILY MEDICINE

## 2022-09-07 PROCEDURE — 3078F DIAST BP <80 MM HG: CPT | Mod: CPTII,S$GLB,, | Performed by: FAMILY MEDICINE

## 2022-09-07 PROCEDURE — 1159F MED LIST DOCD IN RCRD: CPT | Mod: CPTII,S$GLB,, | Performed by: FAMILY MEDICINE

## 2022-09-07 PROCEDURE — 3078F PR MOST RECENT DIASTOLIC BLOOD PRESSURE < 80 MM HG: ICD-10-PCS | Mod: CPTII,S$GLB,, | Performed by: FAMILY MEDICINE

## 2022-09-07 PROCEDURE — 3008F BODY MASS INDEX DOCD: CPT | Mod: CPTII,S$GLB,, | Performed by: FAMILY MEDICINE

## 2022-09-07 PROCEDURE — 3044F HG A1C LEVEL LT 7.0%: CPT | Mod: CPTII,S$GLB,, | Performed by: FAMILY MEDICINE

## 2022-09-07 PROCEDURE — 72040 X-RAY EXAM NECK SPINE 2-3 VW: CPT | Mod: TC,FY,PO

## 2022-09-07 PROCEDURE — 99999 PR PBB SHADOW E&M-EST. PATIENT-LVL III: CPT | Mod: PBBFAC,,, | Performed by: FAMILY MEDICINE

## 2022-09-07 PROCEDURE — 1159F PR MEDICATION LIST DOCUMENTED IN MEDICAL RECORD: ICD-10-PCS | Mod: CPTII,S$GLB,, | Performed by: FAMILY MEDICINE

## 2022-09-07 PROCEDURE — 3044F PR MOST RECENT HEMOGLOBIN A1C LEVEL <7.0%: ICD-10-PCS | Mod: CPTII,S$GLB,, | Performed by: FAMILY MEDICINE

## 2022-09-07 PROCEDURE — 99214 OFFICE O/P EST MOD 30 MIN: CPT | Mod: S$GLB,,, | Performed by: FAMILY MEDICINE

## 2022-09-07 PROCEDURE — 3074F SYST BP LT 130 MM HG: CPT | Mod: CPTII,S$GLB,, | Performed by: FAMILY MEDICINE

## 2022-09-07 RX ORDER — TIZANIDINE 2 MG/1
2 TABLET ORAL EVERY 8 HOURS PRN
Qty: 30 TABLET | Refills: 0 | Status: SHIPPED | OUTPATIENT
Start: 2022-09-07 | End: 2022-09-17

## 2022-09-07 NOTE — PROGRESS NOTES
Assessment & Plan  Problem List Items Addressed This Visit    None  Visit Diagnoses       Muscle spasms of neck    -  Primary    Relevant Medications    tiZANidine (ZANAFLEX) 2 MG tablet    Other Relevant Orders    X-Ray Cervical Spine AP And Lateral          I have considered Bell's palsy as well as TIA and CVA.  Unlikely based upon current symptomatology.   I have discussed the common side effects of this medication with the patient and answered all of the questions they had at the time of this visit regarding this medication.  RTC if symptoms worsen or persist.      Health Maintenance reviewed.    Follow-up: No follow-ups on file.    ______________________________________________________________________    Chief Complaint  Chief Complaint   Patient presents with    Facial Pain         HPI  Cheryl Espinosa is a 51 y.o. female with multiple medical diagnoses as listed in the medical history and problem list that presents for pain.  Pt is known to me with last appointment 6/24/2022.    Patient denies any new symptoms including chest pain, SOB, blurry vision, N/V, diarrhea.  She reports continues facial pressure that she has had for the last few days. She did have a facial twitch and tremor that developed when she got COVID.  She has pressure on the left side of her face that will stretch down her neck to the top of her head.  It is only located on the left side.  It can occur at night.  She is currently in PT that has involved increased shoulder work.  She describes a tingling sensation.  She has been stretching the neck to improve her symptoms.  She did notice the symptoms during the day.  Pressure can occur with laying down.  She is hydrating appropriately since her last visit.   She continues to take calcium and vitamin C.     PAST MEDICAL HISTORY:  Past Medical History:   Diagnosis Date    Carpal tunnel syndrome     GERD (gastroesophageal reflux disease)     History of degenerative disc disease      Metatarsalgia        PAST SURGICAL HISTORY:  History reviewed. No pertinent surgical history.    SOCIAL HISTORY:  Social History     Socioeconomic History    Marital status: Single   Tobacco Use    Smoking status: Never    Smokeless tobacco: Never   Substance and Sexual Activity    Alcohol use: Yes    Drug use: No     Social Determinants of Health     Financial Resource Strain: Low Risk     Difficulty of Paying Living Expenses: Not hard at all   Food Insecurity: No Food Insecurity    Worried About Running Out of Food in the Last Year: Never true    Ran Out of Food in the Last Year: Never true   Transportation Needs: No Transportation Needs    Lack of Transportation (Medical): No    Lack of Transportation (Non-Medical): No   Physical Activity: Sufficiently Active    Days of Exercise per Week: 3 days    Minutes of Exercise per Session: 60 min   Stress: Stress Concern Present    Feeling of Stress : To some extent   Social Connections: Unknown    Frequency of Communication with Friends and Family: More than three times a week    Frequency of Social Gatherings with Friends and Family: Once a week    Active Member of Clubs or Organizations: No    Attends Club or Organization Meetings: Never    Marital Status: Never    Housing Stability: Low Risk     Unable to Pay for Housing in the Last Year: No    Number of Places Lived in the Last Year: 1    Unstable Housing in the Last Year: No       FAMILY HISTORY:  Family History   Problem Relation Age of Onset    Breast cancer Maternal Aunt     Breast cancer Maternal Grandmother        ALLERGIES AND MEDICATIONS: updated and reviewed.  Review of patient's allergies indicates:   Allergen Reactions    Doxycycline Anaphylaxis, Nausea And Vomiting and Other (See Comments)    Medrol [methylprednisolone] Shortness Of Breath and Rash    Nsaids (non-steroidal anti-inflammatory drug) Nausea And Vomiting     Nausea      Current Outpatient Medications   Medication Sig Dispense Refill     "diclofenac (VOLTAREN) 75 MG EC tablet Take 1 tablet (75 mg total) by mouth 2 (two) times daily as needed (back pain). 60 tablet 2    omeprazole (PRILOSEC) 40 MG capsule Take 1 capsule (40 mg total) by mouth once daily. 30 capsule 11    VALIUM 5 mg tablet Take 1 tablet (5 mg total) by mouth every 12 (twelve) hours as needed (back pain). 60 tablet 1    tiZANidine (ZANAFLEX) 2 MG tablet Take 1 tablet (2 mg total) by mouth every 8 (eight) hours as needed. 30 tablet 0     No current facility-administered medications for this visit.         ROS  Review of Systems   Constitutional:  Negative for activity change, appetite change, fatigue, fever and unexpected weight change.   HENT: Negative.  Negative for ear discharge, ear pain, rhinorrhea and sore throat.    Eyes: Negative.    Respiratory:  Negative for apnea, cough, chest tightness, shortness of breath and wheezing.    Cardiovascular:  Negative for chest pain, palpitations and leg swelling.   Gastrointestinal:  Negative for abdominal distention, abdominal pain, constipation, diarrhea and vomiting.   Endocrine: Negative for cold intolerance, heat intolerance, polydipsia and polyuria.   Genitourinary:  Negative for decreased urine volume and urgency.   Musculoskeletal: Negative.    Skin:  Negative for rash.   Neurological:  Negative for dizziness and headaches.   Hematological:  Does not bruise/bleed easily.   Psychiatric/Behavioral:  Negative for agitation, sleep disturbance and suicidal ideas.          Physical Exam  Vitals:    09/07/22 0848   BP: 120/72   Pulse: 77   Temp: 97.8 °F (36.6 °C)   TempSrc: Oral   SpO2: 98%   Weight: 90.4 kg (199 lb 4.7 oz)   Height: 5' 1" (1.549 m)    Body mass index is 37.66 kg/m².  Weight: 90.4 kg (199 lb 4.7 oz)   Height: 5' 1" (154.9 cm)   Physical Exam  Vitals reviewed.   Constitutional:       Appearance: Normal appearance. She is well-developed.   HENT:      Head: Normocephalic and atraumatic.      Right Ear: External ear normal.      " Left Ear: External ear normal.      Nose: Nose normal.      Mouth/Throat:      Mouth: Mucous membranes are moist.      Pharynx: Oropharynx is clear.   Eyes:      Extraocular Movements: Extraocular movements intact.      Conjunctiva/sclera: Conjunctivae normal.      Pupils: Pupils are equal, round, and reactive to light.   Cardiovascular:      Rate and Rhythm: Normal rate and regular rhythm.      Heart sounds: Normal heart sounds.   Pulmonary:      Effort: Pulmonary effort is normal.      Breath sounds: Normal breath sounds.   Musculoskeletal:      Cervical back: No edema, erythema or rigidity. Decreased range of motion.   Skin:     General: Skin is warm and dry.   Neurological:      Mental Status: She is alert and oriented to person, place, and time.         Health Maintenance         Date Due Completion Date    Hepatitis C Screening Never done ---    Colorectal Cancer Screening Never done ---    Influenza Vaccine (1) Never done ---    Mammogram 06/29/2023 6/29/2022    Cervical Cancer Screening 06/23/2025 6/23/2020    Lipid Panel 07/02/2027 7/2/2022    TETANUS VACCINE 11/18/2029 11/18/2019                Patient note was created using [employer].  Any errors in syntax or even information may not have been identified and edited on initial review prior to signing this note.

## 2022-09-20 ENCOUNTER — CLINICAL SUPPORT (OUTPATIENT)
Dept: REHABILITATION | Facility: HOSPITAL | Age: 52
End: 2022-09-20
Payer: COMMERCIAL

## 2022-09-20 DIAGNOSIS — M77.12 LATERAL EPICONDYLITIS OF LEFT ELBOW: ICD-10-CM

## 2022-09-20 DIAGNOSIS — M77.11 LATERAL EPICONDYLITIS, RIGHT ELBOW: ICD-10-CM

## 2022-09-20 PROCEDURE — 97110 THERAPEUTIC EXERCISES: CPT | Mod: PN

## 2022-09-20 NOTE — PROGRESS NOTES
[facility]   Physical Therapy Treatment Note     [id number]    Therapy Diagnosis:   Encounter Diagnoses   Name Primary?    Lateral epicondylitis of left elbow     Lateral epicondylitis, right elbow        Physician: Jayashree Valencia MD    Visit Date: 9/20/2022  Physician Orders: PT Eval and Treat   Medical Diagnosis from Referral:   M77.12 (ICD-10-CM) - Lateral epicondylitis of left elbow   M77.11 (ICD-10-CM) - Lateral epicondylitis, right elbow   M25.511,G89.29 (ICD-10-CM) - Chronic right shoulder pain      Evaluation Date: 7/1/2022  Authorization Period Expiration: 12/31/2022  Plan of Care Expiration: 8/22/2022 to 9/16/2022  Visit # / Visits authorized: 11/20 + eval  FOTO#: obtained 8/11/2022     Time In: 7:07AM  Time Out: 8:00AM  Total Billable Time: 53 minutes (4TE)     Precautions: Standard  SUBJECTIVE     Pt reports: Pt reports that since the last time she was here she started having face discomfort. Discomfort extends from forehead to chin and sometimes down the neck. Pt states that when she would open her jaw the discomfort would get worse. It happens when she is in different positions but not all the time. She saw her doctor who took her off of her liquid IV and told her to reduce PT exercises. Pt states that she still feels like she has swelling in her ands and arms. Pain in her elbows that she had when she first came in is much better, but occasionally comes back if she does a lot of household chores.   She was compliant with home exercise program.  Response to previous treatment: last visit was over 2 weeks ago  Functional change: having gross upper extremity swelling and facial discomfort    Pain: 0/10  Location: right elbow> left elbow    OBJECTIVE     Objective Measures updated at progress report unless specified.     Observation: Pt has visible swelling in bilateral hands c gloves on.     Palpation: Slightly tender in bilateral  triceps, reduced tenderness and trigger points in wrist extensor compartment.      U/E MMT Right Left Pain/Dysfunction with Movement   Shoulder Flexion 4+/5 4+/5     Shoulder Extension 4+/5 4+/5     Shoulder Abduction 4+/5 4+/5     Shoulder Adduction 5/5 5/5     Shoulder IR 4+/5 4+/5     Shoulder ER  @ 0* Abduction 4+/5 5/5     Shoulder ER  @ 90* Abduction NT NT     Elbow Flexion  4+/5 4+/5     Elbow Extension 4+/5 4+/5     Rhomboids 4-/5 4/5     Mid Traps 4/5 4/5     Low Traps 4-/5 4-/5         Strength:  Right: 17kgF  Left : 10kgF     Joint Mobility:   - Elbow: good mobility, no reproduction of previous pain.      Special Tests:   - Coto  - Cozens  -Middle finger resisted extension  - pain with active supination and passive pronation  + Phalans, Reverse Phalans     Treatment     Cheryl received the treatments listed below:      Bold = Performed     Cheryl received the following manual therapy techniques: Myofacial release, Soft tissue Mobilization and Friction Massage were applied to the: right ue for 18 minutes, including tests and measures above:    Median nerve glide manual x1min  Ulnar nerve glide manual x1min  Radial nerve glide manual x1min   - Post Gliding, pt no longer had tingling in her fingers.   Manual passive pronation and supination   Radial rotation and AP mobilization bilateral   Ulnar distraction mobilization bilateral   HVLAT Hooklying Thoracic Region - cavitation noted      Pt was agreeable to dry needling by a certified dry needling PT and signed a consent prior to treatment after being made aware of risks.  30mm needles were inserted into lateral elbow right  Electrical stimulation was applied to these needles at 2 Hz and a pulse of 250 µseconds for 15 minutes with a MCC check. - NT  Afterwards, needles were removed and placed into a sharps container. Pt reported a good response to treatment.        Cheryl received therapeutic exercises to develop strength, endurance, ROM and  "posture for 55 minutes including tests and measures above:    UBE 3/3 - Lv 1  bilateral external rotation c elevation red theraband - 3x8  Bilateral horizontal abd Rtb - 3x10  Bilateral D2 Flexion red theraband - 3x10  Biceps curl to overhead press       Standing Resisted Y's: 3x8, RTB, 3" holds  Row c green theraband - 3x10 5" holds  Seated wrist extensor isometrics c 4# - 6x1min c 1min break   Supine towel under thoracic region chest press c shoulder flexion - 3x10 3" 5#  Supine SA punch c 5# - 3x10  Standing Resisted T's: 2x10, YTB, 3" holds  Scap retract over 1/2 foam in chair - 3x1min  Thoracic ext over small foam roller in chair - 10x10" holds  Seated pronation/supination 3# - 3x15  Seated wrist ext c 3# focus on eccentric control 3x15, 3#,  Seated wrist radial deviation: 2x15, 3#  Prone internal rotation/ER 3x10  Prone middle trap 3x10  Prone Lower trap chicken wing - 3x10  Supported elbow wrist extension - 3x10 5" 1#  Manual wrist extensor stretch into wrist flexion - 15x5"   c blue digitflex 3x10 bilateral   Spiderweb finger ext - bilateral 3x10      Patient Education and Home Exercises     Home Exercises Provided and Patient Education Provided     Education provided:   - Pt educated on POC  - Pt educated on anatomy and physiology of current conditions as it relates to signs and symptoms  - Pt educated on HEP - provided new worksheet with nerve glides.     Written Home Exercises Provided: Patient instructed to cont prior HEP. Exercises were reviewed and Cheryl was able to demonstrate them prior to the end of the session.  Cheryl demonstrated good  understanding of the education provided. See EMR under Patient Instructions for exercises provided during therapy sessions    ASSESSMENT   See Updated plan of care for details    PLAN     See updated plan of care for details    Naina Jefferson, PT, DPT, Cert. DN           "

## 2022-09-20 NOTE — PLAN OF CARE
Outpatient Therapy Updated Plan of Care     Visit Date: 9/20/2022  Name: Cheryl Espinosa  Clinic Number: [id number]    Therapy Diagnosis:   Encounter Diagnoses   Name Primary?    Lateral epicondylitis of left elbow     Lateral epicondylitis, right elbow      Physician: Jayashree Valencia MD    Visit Date: 9/20/2022  Physician Orders: PT Eval and Treat   Medical Diagnosis from Referral:   M77.12 (ICD-10-CM) - Lateral epicondylitis of left elbow   M77.11 (ICD-10-CM) - Lateral epicondylitis, right elbow   M25.511,G89.29 (ICD-10-CM) - Chronic right shoulder pain      Evaluation Date: 7/1/2022  Authorization Period Expiration: 12/31/2022  Plan of Care Expiration: 8/22/2022 to 9/16/2022  Visit # / Visits authorized: 11/20 + eval  FOTO#: obtained 8/11/2022    Subjective     Update:   Pt reports: Pt reports that since the last time she was here she started having face discomfort. Discomfort extends from forehead to chin and sometimes down the neck. Pt states that when she would open her jaw the discomfort would get worse. It happens when she is in different positions but not all the time. She saw her doctor who took her off of her liquid IV and told her to reduce PT exercises. Pt states that she still feels like she has swelling in her ands and arms. Pain in her elbows that she had when she first came in is much better, but occasionally comes back if she does a lot of household chores.   She was compliant with home exercise program.  Response to previous treatment: last visit was over 2 weeks ago  Functional change: having gross upper extremity swelling and facial discomfort     Pain: 0/10  Location: right elbow> left elbow    Objective     Update:   Observation: Pt has visible swelling in bilateral hands c gloves on.      Palpation: Slightly tender in bilateral triceps, reduced tenderness and trigger points in wrist extensor compartment.      U/E MMT Right Left Pain/Dysfunction with Movement   Shoulder Flexion 4+/5  4+/5     Shoulder Extension 4+/5 4+/5     Shoulder Abduction 4+/5 4+/5     Shoulder Adduction 5/5 5/5     Shoulder IR 4+/5 4+/5     Shoulder ER  @ 0* Abduction 4+/5 5/5     Shoulder ER  @ 90* Abduction NT NT     Elbow Flexion  4+/5 4+/5     Elbow Extension 4+/5 4+/5     Rhomboids 4-/5 4/5     Mid Traps 4/5 4/5     Low Traps 4-/5 4-/5         Strength:  Right: 17kgF  Left : 10kgF     Joint Mobility:   - Elbow: good mobility, no reproduction of previous pain.      Special Tests:   - Coto  - Cozens  -Middle finger resisted extension  - pain with active supination and passive pronation  + Phalans, Reverse Phalans    Assessment     Update: Pt presents to PT today abnormal upper extremity swelling and facial discomfort. Pain in face seems to be more related to trigeminal nerve or facial nerve as pt does have a hx of bells palsy. PT geared towards overall swelling reduction with gross upper extremity motions and strengthening. Pt had no reproduction of elbow pain or facial discomfort with exercises performed today. PT to continue to monitor response to PT and encouraged pt to get checked for any kind of inflammatory systemic issue.      Cheryl Is progressing well towards her goals.   Pt prognosis is Good.      Pt will continue to benefit from skilled outpatient physical therapy to address the deficits listed in the problem list box on initial evaluation, provide pt/family education and to maximize pt's level of independence in the home and community environment.      Pt's spiritual, cultural and educational needs considered and pt agreeable to plan of care and goals.     Anticipated barriers to physical therapy: chronicity     Goals:   Short Term Goals (4 Weeks):   1) 1. Pt will be compliant with initial HEP to supplement PT in decreasing pain with functional mobility. - met  2) Pt will reduce worst pain to 5/10 in order to perform work related tasks. - met  3) Pt will improve right elbow Flex/ext strength to 4+/5  in order to perform home related tasks with less pain - met  4) Pt will be able to work for 1 hour without any increase in pain greater than 2 pts on NPRS - met    Previous Short Term Goals Status:   See above  New Short Term Goals Status:   no new STG  Long Term Goal Status:   continue per initial plan of care.  Reasons for Recertification of Therapy:   Gross upper extremity weakness    Plan     Updated Certification Period: 9/20/2022 to 10/7/2022   Recommended Treatment Plan: 2 times per week for 2 weeks: Electrical Stimulation PRN, Manual Therapy, Moist Heat/ Ice, Neuromuscular Re-ed, Patient Education, Therapeutic Activities, and Therapeutic Exercise,Dry Needling PRN. Treated as appropriate by PTA.    Nania Jefferson, PT  9/20/2022      I CERTIFY THE NEED FOR THESE SERVICES FURNISHED UNDER THIS PLAN OF TREATMENT AND WHILE UNDER MY CARE    Physician's comments:        Physician's Signature: ___________________________________________________

## 2022-09-21 ENCOUNTER — OFFICE VISIT (OUTPATIENT)
Dept: OBSTETRICS AND GYNECOLOGY | Facility: CLINIC | Age: 52
End: 2022-09-21
Attending: OBSTETRICS & GYNECOLOGY
Payer: COMMERCIAL

## 2022-09-21 VITALS
DIASTOLIC BLOOD PRESSURE: 81 MMHG | HEIGHT: 61 IN | BODY MASS INDEX: 37.38 KG/M2 | SYSTOLIC BLOOD PRESSURE: 118 MMHG | HEART RATE: 83 BPM | WEIGHT: 198 LBS

## 2022-09-21 DIAGNOSIS — Z01.419 ENCOUNTER FOR GYNECOLOGICAL EXAMINATION WITHOUT ABNORMAL FINDING: ICD-10-CM

## 2022-09-21 DIAGNOSIS — Z12.4 SCREENING FOR MALIGNANT NEOPLASM OF THE CERVIX: ICD-10-CM

## 2022-09-21 DIAGNOSIS — Z91.89 AT HIGH RISK FOR BREAST CANCER: Primary | ICD-10-CM

## 2022-09-21 PROCEDURE — 87624 HPV HI-RISK TYP POOLED RSLT: CPT | Performed by: OBSTETRICS & GYNECOLOGY

## 2022-09-21 PROCEDURE — 99999 PR PBB SHADOW E&M-EST. PATIENT-LVL III: ICD-10-PCS | Mod: PBBFAC,,, | Performed by: OBSTETRICS & GYNECOLOGY

## 2022-09-21 PROCEDURE — 3079F DIAST BP 80-89 MM HG: CPT | Mod: CPTII,S$GLB,, | Performed by: OBSTETRICS & GYNECOLOGY

## 2022-09-21 PROCEDURE — 3079F PR MOST RECENT DIASTOLIC BLOOD PRESSURE 80-89 MM HG: ICD-10-PCS | Mod: CPTII,S$GLB,, | Performed by: OBSTETRICS & GYNECOLOGY

## 2022-09-21 PROCEDURE — 99999 PR PBB SHADOW E&M-EST. PATIENT-LVL III: CPT | Mod: PBBFAC,,, | Performed by: OBSTETRICS & GYNECOLOGY

## 2022-09-21 PROCEDURE — 3008F BODY MASS INDEX DOCD: CPT | Mod: CPTII,S$GLB,, | Performed by: OBSTETRICS & GYNECOLOGY

## 2022-09-21 PROCEDURE — 3074F PR MOST RECENT SYSTOLIC BLOOD PRESSURE < 130 MM HG: ICD-10-PCS | Mod: CPTII,S$GLB,, | Performed by: OBSTETRICS & GYNECOLOGY

## 2022-09-21 PROCEDURE — 99386 PR PREVENTIVE VISIT,NEW,40-64: ICD-10-PCS | Mod: S$GLB,,, | Performed by: OBSTETRICS & GYNECOLOGY

## 2022-09-21 PROCEDURE — 99386 PREV VISIT NEW AGE 40-64: CPT | Mod: S$GLB,,, | Performed by: OBSTETRICS & GYNECOLOGY

## 2022-09-21 PROCEDURE — 3044F HG A1C LEVEL LT 7.0%: CPT | Mod: CPTII,S$GLB,, | Performed by: OBSTETRICS & GYNECOLOGY

## 2022-09-21 PROCEDURE — 1159F PR MEDICATION LIST DOCUMENTED IN MEDICAL RECORD: ICD-10-PCS | Mod: CPTII,S$GLB,, | Performed by: OBSTETRICS & GYNECOLOGY

## 2022-09-21 PROCEDURE — 3074F SYST BP LT 130 MM HG: CPT | Mod: CPTII,S$GLB,, | Performed by: OBSTETRICS & GYNECOLOGY

## 2022-09-21 PROCEDURE — 3044F PR MOST RECENT HEMOGLOBIN A1C LEVEL <7.0%: ICD-10-PCS | Mod: CPTII,S$GLB,, | Performed by: OBSTETRICS & GYNECOLOGY

## 2022-09-21 PROCEDURE — 1159F MED LIST DOCD IN RCRD: CPT | Mod: CPTII,S$GLB,, | Performed by: OBSTETRICS & GYNECOLOGY

## 2022-09-21 PROCEDURE — 3008F PR BODY MASS INDEX (BMI) DOCUMENTED: ICD-10-PCS | Mod: CPTII,S$GLB,, | Performed by: OBSTETRICS & GYNECOLOGY

## 2022-09-21 PROCEDURE — 88175 CYTOPATH C/V AUTO FLUID REDO: CPT | Performed by: OBSTETRICS & GYNECOLOGY

## 2022-09-21 NOTE — PROGRESS NOTES
SUBJECTIVE:   51 y.o. female   for annual routine Pap and checkup. Patient's last menstrual period was 2019 (exact date)..  She has no unusual complaints.    She is in PT for several several physical issues- knee pain, elbow pain and shoulder pain    Past Medical History:   Diagnosis Date    Abnormal Pap smear of cervix     Carpal tunnel syndrome     GERD (gastroesophageal reflux disease)     History of degenerative disc disease     Metatarsalgia      Past Surgical History:   Procedure Laterality Date    COLPOSCOPY       Social History     Socioeconomic History    Marital status: Single   Tobacco Use    Smoking status: Never    Smokeless tobacco: Never   Substance and Sexual Activity    Alcohol use: Yes    Drug use: No    Sexual activity: Not Currently     Social Determinants of Health     Financial Resource Strain: Low Risk     Difficulty of Paying Living Expenses: Not hard at all   Food Insecurity: No Food Insecurity    Worried About Running Out of Food in the Last Year: Never true    Ran Out of Food in the Last Year: Never true   Transportation Needs: No Transportation Needs    Lack of Transportation (Medical): No    Lack of Transportation (Non-Medical): No   Physical Activity: Sufficiently Active    Days of Exercise per Week: 3 days    Minutes of Exercise per Session: 60 min   Stress: Stress Concern Present    Feeling of Stress : To some extent   Social Connections: Unknown    Frequency of Communication with Friends and Family: More than three times a week    Frequency of Social Gatherings with Friends and Family: Once a week    Active Member of Clubs or Organizations: No    Attends Club or Organization Meetings: Never    Marital Status: Never    Housing Stability: Low Risk     Unable to Pay for Housing in the Last Year: No    Number of Places Lived in the Last Year: 1    Unstable Housing in the Last Year: No     Family History   Problem Relation Age of Onset    Breast cancer Maternal  Grandmother     Breast cancer Maternal Aunt     Ovarian cancer Neg Hx     Colon cancer Neg Hx     Cancer Neg Hx      OB History    Para Term  AB Living   0 0 0 0 0 0   SAB IAB Ectopic Multiple Live Births   0 0 0 0 0           Current Outpatient Medications   Medication Sig Dispense Refill    diclofenac (VOLTAREN) 75 MG EC tablet Take 1 tablet (75 mg total) by mouth 2 (two) times daily as needed (back pain). 60 tablet 2    omeprazole (PRILOSEC) 40 MG capsule Take 1 capsule (40 mg total) by mouth once daily. 30 capsule 11    VALIUM 5 mg tablet Take 1 tablet (5 mg total) by mouth every 12 (twelve) hours as needed (back pain). 60 tablet 1     No current facility-administered medications for this visit.     Allergies: Doxycycline, Medrol [methylprednisolone], and Nsaids (non-steroidal anti-inflammatory drug)     The 10-year ASCVD risk score (Johny TOMLINSON, et al., 2019) is: 1.3%    Values used to calculate the score:      Age: 51 years      Sex: Female      Is Non- : No      Diabetic: No      Tobacco smoker: No      Systolic Blood Pressure: 118 mmHg      Is BP treated: No      HDL Cholesterol: 42 mg/dL      Total Cholesterol: 188 mg/dL      ROS:  Constitutional: no weight loss, weight gain, fever, fatigue  Eyes:  No vision changes, glasses/contacts  ENT/Mouth: No ulcers, sinus problems, ears ringing, headache  Cardiovascular: No inability to lie flat, chest pain, exercise intolerance, swelling, heart palpitations  Respiratory: No wheezing, coughing blood, shortness of breath, or cough  Gastrointestinal: No diarrhea, bloody stool, nausea/vomiting, constipation, gas, hemorrhoids  Genitourinary: No blood in urine, painful urination, urgency of urination, frequency of urination, incomplete emptying, incontinence, abnormal bleeding, painful periods, heavy periods, vaginal discharge, vaginal odor, painful intercourse, sexual problems, bleeding after intercourse.  Musculoskeletal: No muscle  weakness, +knee pain, +shoulder pain  Skin/Breast: No painful breasts, nipple discharge, masses, rash, ulcers  Neurological: No passing out, seizures, numbness, headache  Endocrine: No diabetes, hypothyroid, hyperthyroid, hot flashes, hair loss, abnormal hair growth, acne  Psychiatric: No depression, crying  Hematologic: No bruises, bleeding, swollen lymph nodes, anemia.      Physical Exam:   Constitutional: She is oriented to person, place, and time. She appears well-developed and well-nourished.      Neck: No tracheal deviation present. No thyromegaly present.    Cardiovascular:       Exam reveals no edema.        Pulmonary/Chest: Effort normal. She exhibits no mass, no tenderness, no deformity and no retraction. Right breast exhibits no inverted nipple, no mass, no nipple discharge, no skin change, no tenderness, presence, no bleeding and no swelling. Left breast exhibits no inverted nipple, no mass, no nipple discharge, no skin change, no tenderness, presence, no bleeding and no swelling. Breasts are symmetrical.        Abdominal: Soft. She exhibits no distension and no mass. There is no abdominal tenderness. There is no rebound and no guarding. No hernia. Hernia confirmed negative in the left inguinal area.     Genitourinary:    Vagina and uterus normal.   Rectum:      No external hemorrhoid.   There is no rash, tenderness or lesion on the right labia. There is no rash, tenderness or lesion on the left labia. Cervix is normal. No no adexnal prolapse. Right adnexum displays no mass, no tenderness and no fullness. Left adnexum displays no mass, no tenderness and no fullness. No  no vaginal discharge, tenderness, bleeding, rectocele, cystocele or unspecified prolapse of vaginal walls in the vagina. Cervix exhibits no motion tenderness, no discharge and no friability. Uterus is not deviated.           Musculoskeletal: Normal range of motion and moves all extremeties. No edema.       Neurological: She is alert and  oriented to person, place, and time.    Skin: No rash noted. No erythema. No pallor.    Psychiatric: She has a normal mood and affect. Her behavior is normal. Judgment and thought content normal.       ASSESSMENT:   well woman  At high risk for breast cancer- elevated TC score  PLAN:   pap smear  Referral to [facility]  return annually or prn

## 2022-09-22 ENCOUNTER — CLINICAL SUPPORT (OUTPATIENT)
Dept: REHABILITATION | Facility: HOSPITAL | Age: 52
End: 2022-09-22
Payer: COMMERCIAL

## 2022-09-22 DIAGNOSIS — R53.1 DECREASED STRENGTH: ICD-10-CM

## 2022-09-22 DIAGNOSIS — M25.521 PAIN OF BOTH ELBOWS: Primary | ICD-10-CM

## 2022-09-22 DIAGNOSIS — M25.522 PAIN OF BOTH ELBOWS: Primary | ICD-10-CM

## 2022-09-22 DIAGNOSIS — M79.10 TRIGGER POINT: ICD-10-CM

## 2022-09-22 PROCEDURE — 97110 THERAPEUTIC EXERCISES: CPT | Mod: PN

## 2022-09-22 NOTE — PROGRESS NOTES
"[facility]   Physical Therapy Treatment Note     [facility]  [facility] [id number]    Therapy Diagnosis:   Encounter Diagnoses   Name Primary?    Pain of both elbows Yes    Decreased strength     Trigger point        Physician: Jayashree Valencia MD    Visit Date: 9/22/2022  Physician Orders: PT Eval and Treat   Medical Diagnosis from Referral:   M77.12 (ICD-10-CM) - Lateral epicondylitis of left elbow   M77.11 (ICD-10-CM) - Lateral epicondylitis, right elbow   M25.511,G89.29 (ICD-10-CM) - Chronic right shoulder pain      Evaluation Date: 7/1/2022  Authorization Period Expiration: 12/31/2022  Plan of Care Expiration: 9/20/2022 to 10/7/2022   Visit # / Visits authorized: 12/20 + eval  FOTO#: obtained 8/11/2022 NEXT     Time In: 7:03AM  Time Out: 8:00AM  Total Billable Time: 57 minutes (4TE)     Precautions: Standard  SUBJECTIVE     Pt reports: Pt reports that she did not have any of the face symptoms after last visit and felt like she is working her shoulders pretty good. She feels it more in the left more than the right.   She was compliant with home exercise program.  Response to previous treatment: felt good, no pain  Functional change: facial symptoms diminished    Pain: 0/10  Location: right elbow> left elbow    OBJECTIVE     Objective Measures updated at progress report unless specified.     Treatment     Cheryl received the treatments listed below:      Bold = Performed     Cheryl received therapeutic exercises to develop strength, endurance, ROM and posture for 55 minutes including tests and measures above:    UBE 3/3 - Lv 1  Supine towel under thoracic region chest press c shoulder flexion - 3x10 3" 5#  Supine SA punch c 5# - 3x10  bilateral external rotation c elevation red theraband - 3x10  Bilateral horizontal abd Rtb - 3x10  Bilateral D2 Flexion red theraband - 3x10 bilateral   Biceps curl to overhead press 4# - 3x10   Front raise/lat raise 2# - 3x10 "   Freemotion SAPD 3# 3x10      Patient Education and Home Exercises     Home Exercises Provided and Patient Education Provided     Education provided:   - Pt educated on POC  - Pt educated on anatomy and physiology of current conditions as it relates to signs and symptoms  - Pt educated on HEP - provided new worksheet with nerve glides.     Written Home Exercises Provided: Patient instructed to cont prior HEP. Exercises were reviewed and Cheryl was able to demonstrate them prior to the end of the session.  Cheryl demonstrated good  understanding of the education provided. See EMR under Patient Instructions for exercises provided during therapy sessions    ASSESSMENT   Pt presents to PT today with diminished upper extremity symptoms and no facial discomfort. Pt did well with strengthening today with focus on gross upper extremity strengthening and scapular control and stability. Pt demonstrated increased fatigue on the left compared to right possibly due to previous focus on right upper extremity due to pain being more prominent on the right. PT to continue to monitor pt response to treatment.     Cheryl Is progressing well towards her goals.   Pt prognosis is Good.      Pt will continue to benefit from skilled outpatient physical therapy to address the deficits listed in the problem list box on initial evaluation, provide pt/family education and to maximize pt's level of independence in the home and community environment.      Pt's spiritual, cultural and educational needs considered and pt agreeable to plan of care and goals.     Anticipated barriers to physical therapy: chronicity     Goals:   Short Term Goals (4 Weeks):   1) 1. Pt will be compliant with initial HEP to supplement PT in decreasing pain with functional mobility. - met  2) Pt will reduce worst pain to 5/10 in order to perform work related tasks. - met  3) Pt will improve right elbow Flex/ext strength to 4+/5 in order to perform home related  tasks with less pain - met  4) Pt will be able to work for 1 hour without any increase in pain greater than 2 pts on NPRS - met    PLAN   Continue with gross upper extremity strengthening and progress PRN.     Naina Jefferson, PT, DPT, Cert. DN

## 2022-09-27 ENCOUNTER — CLINICAL SUPPORT (OUTPATIENT)
Dept: REHABILITATION | Facility: HOSPITAL | Age: 52
End: 2022-09-27
Payer: COMMERCIAL

## 2022-09-27 DIAGNOSIS — M25.522 PAIN OF BOTH ELBOWS: Primary | ICD-10-CM

## 2022-09-27 DIAGNOSIS — M79.10 TRIGGER POINT: ICD-10-CM

## 2022-09-27 DIAGNOSIS — M25.521 PAIN OF BOTH ELBOWS: Primary | ICD-10-CM

## 2022-09-27 DIAGNOSIS — R53.1 DECREASED STRENGTH: ICD-10-CM

## 2022-09-27 LAB
FINAL PATHOLOGIC DIAGNOSIS: NORMAL
Lab: NORMAL

## 2022-09-27 PROCEDURE — 97110 THERAPEUTIC EXERCISES: CPT | Mod: PN

## 2022-09-29 ENCOUNTER — CLINICAL SUPPORT (OUTPATIENT)
Dept: REHABILITATION | Facility: HOSPITAL | Age: 52
End: 2022-09-29
Payer: COMMERCIAL

## 2022-09-29 DIAGNOSIS — M25.522 PAIN OF BOTH ELBOWS: Primary | ICD-10-CM

## 2022-09-29 DIAGNOSIS — M79.10 TRIGGER POINT: ICD-10-CM

## 2022-09-29 DIAGNOSIS — R53.1 DECREASED STRENGTH: ICD-10-CM

## 2022-09-29 DIAGNOSIS — M25.521 PAIN OF BOTH ELBOWS: Primary | ICD-10-CM

## 2022-09-29 PROCEDURE — 97110 THERAPEUTIC EXERCISES: CPT | Mod: PN

## 2022-09-29 NOTE — PROGRESS NOTES
"[facility]   Physical Therapy Treatment Note     Name: Cheryl Espinosa  Clinic Number: [id number]    Therapy Diagnosis:   No diagnosis found.      Physician: Jayashree Valencia MD    Visit Date: 9/29/2022  Physician Orders: PT Eval and Treat   Medical Diagnosis from Referral:   M77.12 (ICD-10-CM) - Lateral epicondylitis of left elbow   M77.11 (ICD-10-CM) - Lateral epicondylitis, right elbow   M25.511,G89.29 (ICD-10-CM) - Chronic right shoulder pain      Evaluation Date: 7/1/2022  Authorization Period Expiration: 12/31/2022  Plan of Care Expiration: 9/20/2022 to 10/7/2022   Visit # / Visits authorized: 14/20 + eval  FOTO#: obtained 8/11/2022 NEXT     Time In: 7:03AM  Time Out: 8:00AM  Total Billable Time: 57 minutes (4TE)     Precautions: Standard  SUBJECTIVE     Pt reports: Pt went home and did a lot of yard work for her sister. She realized that if she keeps her arms busy her soreness is not nearly as bad. She no longer feels it in the elbows. She only feels like her left arm is weaker than her Right.   She was compliant with home exercise program.  Response to previous treatment: less soreness   Functional change: doing more yard work     Pain: 0/10  Location: right elbow> left elbow    OBJECTIVE     Objective Measures updated at progress report unless specified.     Treatment     Cheryl received the treatments listed below:      Bold = Performed     Cheryl received therapeutic exercises to develop strength, endurance, ROM and posture for 55 minutes including tests and measures above:    UBE 3/3 - Lv 2.5  Supine towel under thoracic region chest press c shoulder flexion - 3x10 3" 10#  Land mine 5# - bilateral - 3x10  bilateral external rotation c elevation red theraband - 3x10  Bilateral horizontal abd green theraband  - 3x10  Bilateral D2 Flexion 2# - 3x10 bilateral   Biceps curl to overhead press 5# - 3x10   Front raise/lat raise 2# - 3x10   Freemotion SAPD 3# " 3x10      Patient Education and Home Exercises     Home Exercises Provided and Patient Education Provided     Education provided:   - Pt educated on POC  - Pt educated on anatomy and physiology of current conditions as it relates to signs and symptoms  - Pt educated on HEP - provided new worksheet with nerve glides.     Written Home Exercises Provided: Patient instructed to cont prior HEP. Exercises were reviewed and Cheryl was able to demonstrate them prior to the end of the session.  Cheryl demonstrated good  understanding of the education provided. See EMR under Patient Instructions for exercises provided during therapy sessions    ASSESSMENT   Pt reports to PT today with improvements in overall upper extremity symptoms. Elbow pain has diminished greatly and pt is more able to perform functional tasks. Pt was slightly progressed in strength but will be primarily instructed on what to continue with in order to DC to home exercise program next week.     Cheryl Is progressing well towards her goals.   Pt prognosis is Good.      Pt will continue to benefit from skilled outpatient physical therapy to address the deficits listed in the problem list box on initial evaluation, provide pt/family education and to maximize pt's level of independence in the home and community environment.      Pt's spiritual, cultural and educational needs considered and pt agreeable to plan of care and goals.     Anticipated barriers to physical therapy: chronicity     Goals:   Short Term Goals (4 Weeks):   1) 1. Pt will be compliant with initial HEP to supplement PT in decreasing pain with functional mobility. - met  2) Pt will reduce worst pain to 5/10 in order to perform work related tasks. - met  3) Pt will improve right elbow Flex/ext strength to 4+/5 in order to perform home related tasks with less pain - met  4) Pt will be able to work for 1 hour without any increase in pain greater than 2 pts on NPRS - met    PLAN    Continue with gross upper extremity strengthening and progress PRN.     Naina Jefferson, PT, DPT, Cert. DN

## 2022-09-30 PROBLEM — Z91.89 AT HIGH RISK FOR BREAST CANCER: Status: ACTIVE | Noted: 2022-09-30

## 2022-09-30 NOTE — PROGRESS NOTES
"  Reason For Consultation:   Increased lifetime risk of breast cancer    Referring Provider:   Cheryl Nguyen MD  [address] [address]    Records Obtained: Records of the patients history including those obtained from the referring provider were reviewed and summarized in detail.    HPI:   Cheryl Espinosa presents for consultation of increased risk of breast cancer. She is postmenopausal. She presented for screening mammogram on 22 which was benign but revealed a Tyrer-Cuzick score of 20.77%.       Today, Feels good and no complaints.   No breast concerns.    High Risk Breast cancer specific history:  - Age: 51 y.o.   - Height:  5'1"  - Weight: 90.5 kg  - Breast density per BI-RADS: Heterogeneously Dense   - Age at menarche:  12  - Number of pregnancies:   - Age at menopause, if applicable:  50 - LMP 2021   -Uterus and ovaries intact: Yes  - HRT: No      - Genetic testing: No  - Personal history of cancer: No  - Previous chest radiation exposure between ages 10-30 years old: No  - Personal history of breast biopsy: No  - Ashkenazi Restorationist Inheritance: No  - Family history of cancer:     Maternal Grandmother Breast Cancer  at 77    Maternal Aunt Breast Cancer diagnosed; alive at 68   Mother had breast mass removed, but benign - mother  - [place]   Father  from [place]    Brother  from AIDs related lymphoma     Social History:  Tobacco use:  None  Alcohol use:  Rare; Occasional  Exercise regimen: When she can  Employment: Human resources     SEE CALCULATED RISK BELOW.     Past Medical   Past Medical History:   Diagnosis Date    Abnormal Pap smear of cervix     Carpal tunnel syndrome     GERD (gastroesophageal reflux disease)     History of degenerative disc disease     Metatarsalgia      Patient Active Problem List   Diagnosis    Osteopenia    History of endometriosis    GERD without esophagitis    Chronic bilateral low back " "pain with bilateral sciatica    Decreased range of motion of intervertebral discs of lumbar spine    Weakness of both lower extremities    Posture imbalance    Chronic pain of right knee    Pain of both elbows    Decreased strength    Trigger point    At high risk for breast cancer     Social History   Social History     Tobacco Use    Smoking status: Never    Smokeless tobacco: Never   Substance Use Topics    Alcohol use: Yes    Drug use: No     Family History  Family History   Problem Relation Age of Onset    Breast cancer Maternal Grandmother     Breast cancer Maternal Aunt     Ovarian cancer Neg Hx     Colon cancer Neg Hx     Cancer Neg Hx      Medications    Current Outpatient Medications:     diclofenac (VOLTAREN) 75 MG EC tablet, Take 1 tablet (75 mg total) by mouth 2 (two) times daily as needed (back pain)., Disp: 60 tablet, Rfl: 2    omeprazole (PRILOSEC) 40 MG capsule, Take 1 capsule (40 mg total) by mouth once daily., Disp: 30 capsule, Rfl: 11    VALIUM 5 mg tablet, Take 1 tablet (5 mg total) by mouth every 12 (twelve) hours as needed (back pain)., Disp: 60 tablet, Rfl: 1  Allergies  Review of patient's allergies indicates:   Allergen Reactions    Doxycycline Anaphylaxis, Nausea And Vomiting and Other (See Comments)    Medrol [methylprednisolone] Shortness Of Breath and Rash    Nsaids (non-steroidal anti-inflammatory drug) Nausea And Vomiting     Nausea        Review of Systems       See above   Review of Systems  Review of Systems  All other systems reviewed and are negative.    Objective:      Vitals:   Vitals:    10/14/22 1034   BP: 111/69   Pulse: 86   Resp: 19   Temp: 98 °F (36.7 °C)   TempSrc: Oral   SpO2: 99%   Weight: 90.5 kg (199 lb 8.3 oz)   Height: 5' 1" (1.549 m)     BMI: Body mass index is 37.7 kg/m².   Body surface area is 1.97 meters squared.    Physical Exam  Vitals and nursing note reviewed.   Constitutional:       General: She is not in acute distress.     Appearance: Normal appearance. " She is well-developed.   HENT:      Head: Normocephalic.   Eyes:      Pupils: Pupils are equal, round, and reactive to light.   Cardiovascular:      Rate and Rhythm: Normal rate and regular rhythm.      Heart sounds: Normal heart sounds.   Pulmonary:      Effort: Pulmonary effort is normal.      Breath sounds: Normal breath sounds.   Musculoskeletal:      Cervical back: Normal range of motion.   Skin:     General: Skin is warm and dry.      Findings: No rash.   Neurological:      Mental Status: She is alert and oriented to person, place, and time.   Psychiatric:         Behavior: Behavior normal.         Laboratory Data: reviewed most recent   Imaging: reviewed most recent        Assessment:     1. At high risk for breast cancer        1. Increased risk of breast cancer   * Tyrer-Cuzick (TC) lifetime risk of 24.1%. We discussed that TC score will categorize your lifetime risk of being diagnosed with breast cancer. Categories are as such: Average risk <15%, Intermediate risk 15-19%, and High risk > or = to 20%.    * The Luh Model for Breast Cancer risk: She has a 1.1% 5-year breast cancer risk (Compared with 1.2% for the average 49 y.o. woman) and 11% Lifetime breast cancer risk (Compared with 11.3% for the average 49 y.o. woman). A woman's risk is considered low if her five-year risk of developing breast cancer is less than 1.6%; it is considered high if she scores above 1.66%. (All women who are over 60 have a score of at least 1.66 and are considered high risk, based on the Luh Model.)      Risk factors are categorized into 2 groups: Modifiable and Non-modifiable. Modifiable risk factors include use of hormones, alcohol, smoking, diet and exercise. Non-modifiable risk factors include breast density, genetics, chest radiation, previous pregnancies, age of first period, and age of menopause.    * For women at high risk for breast cancer, endocrine therapy can reduce the risk of invasive and/or in situ breast  cancers. (tamoxifen for premenopausal or postmenopausal women and raloxifene or exemestane for postmenopausal women).   * Discussed that Tamoxifen 20 mg daily for 5 years has shown to reduce risk of breast cancer by 49% and women with ADH/ALH or LCIS have an even more significant reduction of risk of 86%. Aromatase inhibitors for 5 years have also shown risk reduction in terms of 50-60%. At current, there is not adequate data to recommend longer courses of therapy more than 5 years for risk reduction.    * Tamoxifen has limited data in BRCA 1/2 mutation carriers but limited retrospective data is suggestive of benefit. There is retrospective data that aromatase inhibitors can reduce the risk of contralateral ER positive breast cancers in BRCA 1/2 patients who were taking AIs as adjuvant therapy. There is no data for raloxifen in the population.    * Reviewed risks of Tamoxifen side effects include hot flashes, invasive endometrial cancer in women > 49 years of age (2.3/1000 compared to 0.9/1000), cataracts, increased risk of pulmonary embolism among others. A handout was given to her.   * Reviewed Lifestyle modifications which have shown benefit:  Limit alcohol consumption to less than 1 drink per day (1 ounce liquor, 6 oz wine, 8 oz beer)  Avoid smoking.  Exercise at least 150 minutes per week of moderate intensity aerobic activity or at least 75 minutes of vigorous activity. Exercise can lower the relative risk of breast cancer by ~18-20%.  Maintain healthy weight and avoid post-menopausal weight gain. Avoid processed foods and eat more lean proteins, fruits and vegetables.                * Discussed available resources including genetic counseling, nutrition, weight management.    * Reviewed future screening:   Semiannual (every 6 months) CBE (clinical breast exam).   Annual Breast MRI alternating with an annual MMG. if TC 20% or greater.                   Discussed with patient that there are several models  available for stratifying breast cancer risk, and Jacqui-Keo is presently the model utilized by [facility] and is a model recommended per current NCCN guidelines.    The Luh Model for Breast Cancer risk estimates the absolute 5 year risk and lifetime risk of developing breast cancer. Family history includes only first degree relatives with breast cancer, which is not enough information to estimate the risk of a patient having BRCA mutation. It also underestimates the cancer risk for patients with extensive family history. The Luh Model is a good predictor of risk for populations but not for individuals. It adjusts risk for race/ethnicity. It may underestimate breast cancer risk in patients with atypical hyperplasia and strong family history. The Luh Model was NOT designed to estimate risk for: Women with a prior diagnosis of breast cancer, lobular carcinoma in situ (LCIS), or ductal carcinoma in situ (DCIS);  Women who have received previous radiation therapy to the chest for treatment of Hodgkin lymphoma;  Women with gene mutations in BRCA1 or BRCA2, or those who are known to have certain genetic syndromes that increase risk for breast cancer; Women of age <35 or >85.    We discussed that there are limitations to every model for risk assessment, particularly that TC can overestimate risk in women with atypical hyperplasia and dense breasts and that Luh underestimates risk for those with a strong family history of breast or ovarian cancers as well as non-white women with atypical hyperplasia which can make them appear to not be candidates for risk reducing therapies.       Plan:     Patient has opted out chemoprevention but will call in the future if she wishes to pursue.  Patient elects to proceed with alternating annual mammogram and annual breast MRI along with semiannual CBEs.  Patient will follow up with PCP or GYN for one semiannual CBE along with annual mammogram in June 2023 and will RTC  here for one semiannual CBE along with annual breast MRI in Nov 2022.  Lifestyle modifications as detailed above.   5.   Encouraged breast awareness, including monthly breast self-exams.       RTC in 6 months to see me either at [facility] or on 3rd floor.     Questions were encouraged and answered to patient's satisfaction, and patient verbalized understanding of information and agreement with the plan. Advised patient to RTC with any interval changes or concerns.            Patient is in agreement with the proposed treatment plan. All questions were answered to the patient's satisfaction. Patient knows to call clinic for any new or worsening symptoms and if anything is needed before the next clinic visit.          Lexi Carter, BERNARDOP-C  Hematology & Medical Oncology   [facility] [place] [address]  ph. [phone]  Fax. [phone]    Collaborating physician, Dr. Martinez.    Total time spent with the patient: 60 minutes, 45 minutes of face to face consultation and 15 minutes of chart review and coordination of care, on the day of the visit. This includes face to face time and non-face to face time preparing to see the patient (eg, review of tests), obtaining and/or reviewing separately obtained history, documenting clinical information in the electronic or other health record, independently interpreting resultsand communicating results to the patient/family/caregiver, or care coordination.      Route Chart for Scheduling    Med Onc Chart Routing      Follow up with physician    Follow up with ZAY . 5 months with me   Infusion scheduling note    Injection scheduling note    Labs    Imaging MRI and mammogram   MRI before the end of Nov, mammogram June   Pharmacy appointment    Other referrals

## 2022-10-04 ENCOUNTER — CLINICAL SUPPORT (OUTPATIENT)
Dept: REHABILITATION | Facility: HOSPITAL | Age: 52
End: 2022-10-04
Payer: COMMERCIAL

## 2022-10-04 DIAGNOSIS — M79.10 TRIGGER POINT: ICD-10-CM

## 2022-10-04 DIAGNOSIS — R53.1 DECREASED STRENGTH: ICD-10-CM

## 2022-10-04 DIAGNOSIS — M25.521 PAIN OF BOTH ELBOWS: Primary | ICD-10-CM

## 2022-10-04 DIAGNOSIS — M25.522 PAIN OF BOTH ELBOWS: Primary | ICD-10-CM

## 2022-10-04 PROCEDURE — 97110 THERAPEUTIC EXERCISES: CPT | Mod: PN

## 2022-10-04 NOTE — PROGRESS NOTES
"[facility]   Physical Therapy Treatment Note     Name: Cheryl Huber  Clinic Number: [id number]    Therapy Diagnosis:   Encounter Diagnoses   Name Primary?    Pain of both elbows Yes    Decreased strength     Trigger point        Physician: Jayashree Valencia MD    Visit Date: 10/4/2022  Physician Orders: PT Eval and Treat   Medical Diagnosis from Referral:   M77.12 (ICD-10-CM) - Lateral epicondylitis of left elbow   M77.11 (ICD-10-CM) - Lateral epicondylitis, right elbow   M25.511,G89.29 (ICD-10-CM) - Chronic right shoulder pain      Evaluation Date: 7/1/2022  Authorization Period Expiration: 12/31/2022  Plan of Care Expiration: 9/20/2022 to 10/7/2022   Visit # / Visits authorized: 15/20 + eval  FOTO#: obtained 8/11/2022 NEXT     Time In: 7:05AM  Time Out: 8:00AM  Total Billable Time: 55 minutes (4TE)     Precautions: Standard  SUBJECTIVE     Pt reports: Pt reports that she was able to lift bails of hay to decorate for a party. Her left shoulder continues to feel slightly different and weak. She had the thing to her face last night but it went away.   She was compliant with home exercise program.  Response to previous treatment: less soreness   Functional change: able to lift bails of hay    Pain: 0/10  Location: left shoulder > Right     OBJECTIVE     Objective Measures updated at progress report unless specified.     Treatment     Cheryl received the treatments listed below:      Bold = Performed     Cheryl received the following manual therapy techniques: Joint mobilizations were applied to the: Thoracic spine for 5 minutes, including:  HVLAT Thoracic region c cavitation      Cheryl received therapeutic exercises to develop strength, endurance, ROM and posture for 53 minutes including tests and measures above:    UBE 3/3 - Lv 2.5  Supine towel under thoracic region chest press c shoulder flexion - 3x10 3" 10#  Land mine 5# - bilateral - 3x10  bilateral external " rotation c elevation green theraband - 3x10  Bilateral horizontal abd green theraband  - 3x10  Bilateral D2 Flexion 3# - 3x10 bilateral   Biceps curl to overhead press 6# - 3x10   Front raise/lat raise 3# - 3x10   Back fly 3# - 3x10   Freemotion SAPD 3# 3x10 - OOT      Patient Education and Home Exercises     Home Exercises Provided and Patient Education Provided     Education provided:   - Pt educated on POC  - Pt educated on anatomy and physiology of current conditions as it relates to signs and symptoms  - Pt educated on HEP - provided new worksheet with nerve glides.     Written Home Exercises Provided: Patient instructed to cont prior HEP. Exercises were reviewed and Cheryl was able to demonstrate them prior to the end of the session.  Cheryl demonstrated good  understanding of the education provided. See EMR under Patient Instructions for exercises provided during therapy sessions    ASSESSMENT   Pt presents to PT today with mostly left shoulder soreness 2/2 weakness and activity performed yesterday. Pt was advanced in exercises and was educated on how to progress at home. Pt will have one more visit in order to discharge from skilled PT to home exercise program.     Cheryl Is progressing well towards her goals.   Pt prognosis is Good.      Pt will continue to benefit from skilled outpatient physical therapy to address the deficits listed in the problem list box on initial evaluation, provide pt/family education and to maximize pt's level of independence in the home and community environment.      Pt's spiritual, cultural and educational needs considered and pt agreeable to plan of care and goals.     Anticipated barriers to physical therapy: chronicity     Goals:   Short Term Goals (4 Weeks):   1) 1. Pt will be compliant with initial HEP to supplement PT in decreasing pain with functional mobility. - met  2) Pt will reduce worst pain to 5/10 in order to perform work related tasks. - met  3) Pt will  improve right elbow Flex/ext strength to 4+/5 in order to perform home related tasks with less pain - met  4) Pt will be able to work for 1 hour without any increase in pain greater than 2 pts on NPRS - met    PLAN   DC next visit.     Naina Jefferson, PT, DPT

## 2022-10-05 NOTE — PROGRESS NOTES
[facility]   Physical Therapy Treatment Note/Discharge Summary    Name: Cheryl Espinosa  Clinic Number: [id number]    Therapy Diagnosis:   Encounter Diagnoses   Name Primary?    Pain of both elbows Yes    Decreased strength     Trigger point          Physician: Jayashree Valencia MD    Visit Date: 10/6/2022  Physician Orders: PT Eval and Treat   Medical Diagnosis from Referral:   M77.12 (ICD-10-CM) - Lateral epicondylitis of left elbow   M77.11 (ICD-10-CM) - Lateral epicondylitis, right elbow   M25.511,G89.29 (ICD-10-CM) - Chronic right shoulder pain      Evaluation Date: 7/1/2022  Authorization Period Expiration: 12/31/2022  Plan of Care Expiration: 9/20/2022 to 10/7/2022   Visit # / Visits authorized: 16/20 + eval  FOTO#: obtained today     Time In: 7:05AM  Time Out: 8:00AM  Total Billable Time: 55 minutes (4TE)     Precautions: Standard  SUBJECTIVE     Pt reports: Pt reports that she is much better than when she first started. Feels like she is able to manage everything now.   She was compliant with home exercise program.  Response to previous treatment: felt good  Functional change: no longer restricting motion    Pain: 0/10  Location: left shoulder > Right     OBJECTIVE     Objective Measures updated at progress report unless specified.      U/E MMT Right Left Pain/Dysfunction with Movement   Shoulder Flexion 5/5 5/5     Shoulder Extension 4+/5 4+/5     Shoulder Abduction 5/5 5/5     Shoulder Adduction 5/5 5/5     Shoulder IR 4+/5 4+/5     Shoulder ER  @ 0* Abduction 4+/5 5/5     Shoulder ER  @ 90* Abduction NT NT     Elbow Flexion  4+/5 4+/5     Elbow Extension 4+/5 4+/5     Rhomboids 4+/5 4+/5     Mid Traps 4+/5 4+/5     Low Traps 4+/5 4+/5         Strength:  Right: 20kgF  Left : 18kgF        Special Tests:   - Coto  - Cozens  -Middle finger resisted extension  - pain with active supination and passive pronation  + Phalans,   - Revers Phalans    Treatment     Cheryl  received the treatments listed below:      Bold = Performed     Cheryl received the following manual therapy techniques: Joint mobilizations were applied to the: Thoracic spine for 5 minutes, including:  HVLAT Thoracic region c cavitation      Cheryl received therapeutic exercises to develop strength, endurance, ROM and posture for 50 minutes including tests and measures above:    Reassessment above   UBE 3/3 - Lv 2.5  SA punch c blue theraband 3x10 bilateral   bilateral external rotation c elevation green theraband - 3x10  Bilateral horizontal abd green theraband  - 3x10  Bilateral D2 Flexion 3# - 3x10 bilateral     Patient Education and Home Exercises     Home Exercises Provided and Patient Education Provided     Education provided:   - Pt educated on DC    Written Home Exercises Provided: Patient instructed to cont prior HEP. Exercises were reviewed and Cheryl was able to demonstrate them prior to the end of the session.  Cheryl demonstrated good  understanding of the education provided. See EMR under Patient Instructions for exercises provided during therapy sessions    ASSESSMENT   Pt originally presented to PT with bilateral elbow pain. Pt is no longer having pain she originally came in for. Pt demonstrated improvement in overall upper extremity function and strength. She is now able to control pain with nerve glides, postural strengthening, and thoracic mobilization independently. Pt has met all goals set out at beginning of treatment. Due to pt independent status, she is no longer in need of skilled physical therapy. DC PT.     Cheryl Is progressing well towards her goals.   Pt prognosis is Good.      Pt's spiritual, cultural and educational needs considered and pt agreeable to plan of care and goals.     Anticipated barriers to physical therapy: chronicity     Goals:   Short Term Goals (4 Weeks):   1) 1. Pt will be compliant with initial HEP to supplement PT in decreasing pain with functional  mobility. - met  2) Pt will reduce worst pain to 5/10 in order to perform work related tasks. - met  3) Pt will improve right elbow Flex/ext strength to 4+/5 in order to perform home related tasks with less pain - met  4) Pt will be able to work for 1 hour without any increase in pain greater than 2 pts on NPRS - met    Long Term Goals:  1) Pt will be compliant with final HEP to reduce risk of further injury after dc. - met  2)Pt will improve right elbow Flex/ext strength to 5/5 in order to perform home related tasks with less pain - met  4) Pt will be able to work for 2 hour without any increase in pain greater than 2 pts on NPRS - met  4) Pt will improve FOTO limitation to 41% indicative of overall improvement in function. - met    PLAN   DC PT    Naina Jefferson, PT, DPT

## 2022-10-06 ENCOUNTER — CLINICAL SUPPORT (OUTPATIENT)
Dept: REHABILITATION | Facility: HOSPITAL | Age: 52
End: 2022-10-06
Payer: COMMERCIAL

## 2022-10-06 DIAGNOSIS — R53.1 DECREASED STRENGTH: ICD-10-CM

## 2022-10-06 DIAGNOSIS — M25.521 PAIN OF BOTH ELBOWS: Primary | ICD-10-CM

## 2022-10-06 DIAGNOSIS — M79.10 TRIGGER POINT: ICD-10-CM

## 2022-10-06 DIAGNOSIS — M25.522 PAIN OF BOTH ELBOWS: Primary | ICD-10-CM

## 2022-10-06 PROCEDURE — 97110 THERAPEUTIC EXERCISES: CPT | Mod: PN

## 2022-10-13 ENCOUNTER — PATIENT MESSAGE (OUTPATIENT)
Dept: ORTHOPEDICS | Facility: CLINIC | Age: 52
End: 2022-10-13
Payer: COMMERCIAL

## 2022-10-13 ENCOUNTER — PATIENT MESSAGE (OUTPATIENT)
Dept: REHABILITATION | Facility: HOSPITAL | Age: 52
End: 2022-10-13
Payer: COMMERCIAL

## 2022-10-14 ENCOUNTER — OFFICE VISIT (OUTPATIENT)
Dept: HEMATOLOGY/ONCOLOGY | Facility: CLINIC | Age: 52
End: 2022-10-14
Payer: COMMERCIAL

## 2022-10-14 VITALS
HEART RATE: 86 BPM | TEMPERATURE: 98 F | OXYGEN SATURATION: 99 % | RESPIRATION RATE: 19 BRPM | BODY MASS INDEX: 37.66 KG/M2 | HEIGHT: 61 IN | SYSTOLIC BLOOD PRESSURE: 111 MMHG | WEIGHT: 199.5 LBS | DIASTOLIC BLOOD PRESSURE: 69 MMHG

## 2022-10-14 DIAGNOSIS — M85.80 OSTEOPENIA, UNSPECIFIED LOCATION: Primary | ICD-10-CM

## 2022-10-14 DIAGNOSIS — Z91.89 AT HIGH RISK FOR BREAST CANCER: ICD-10-CM

## 2022-10-14 PROCEDURE — 99205 PR OFFICE/OUTPT VISIT, NEW, LEVL V, 60-74 MIN: ICD-10-PCS | Mod: S$GLB,,, | Performed by: NURSE PRACTITIONER

## 2022-10-14 PROCEDURE — 1160F RVW MEDS BY RX/DR IN RCRD: CPT | Mod: CPTII,S$GLB,, | Performed by: NURSE PRACTITIONER

## 2022-10-14 PROCEDURE — 99205 OFFICE O/P NEW HI 60 MIN: CPT | Mod: S$GLB,,, | Performed by: NURSE PRACTITIONER

## 2022-10-14 PROCEDURE — 3044F PR MOST RECENT HEMOGLOBIN A1C LEVEL <7.0%: ICD-10-PCS | Mod: CPTII,S$GLB,, | Performed by: NURSE PRACTITIONER

## 2022-10-14 PROCEDURE — 1160F PR REVIEW ALL MEDS BY PRESCRIBER/CLIN PHARMACIST DOCUMENTED: ICD-10-PCS | Mod: CPTII,S$GLB,, | Performed by: NURSE PRACTITIONER

## 2022-10-14 PROCEDURE — 3078F DIAST BP <80 MM HG: CPT | Mod: CPTII,S$GLB,, | Performed by: NURSE PRACTITIONER

## 2022-10-14 PROCEDURE — 3044F HG A1C LEVEL LT 7.0%: CPT | Mod: CPTII,S$GLB,, | Performed by: NURSE PRACTITIONER

## 2022-10-14 PROCEDURE — 99999 PR PBB SHADOW E&M-EST. PATIENT-LVL V: CPT | Mod: PBBFAC,,, | Performed by: NURSE PRACTITIONER

## 2022-10-14 PROCEDURE — 99999 PR PBB SHADOW E&M-EST. PATIENT-LVL V: ICD-10-PCS | Mod: PBBFAC,,, | Performed by: NURSE PRACTITIONER

## 2022-10-14 PROCEDURE — 1159F MED LIST DOCD IN RCRD: CPT | Mod: CPTII,S$GLB,, | Performed by: NURSE PRACTITIONER

## 2022-10-14 PROCEDURE — 1159F PR MEDICATION LIST DOCUMENTED IN MEDICAL RECORD: ICD-10-PCS | Mod: CPTII,S$GLB,, | Performed by: NURSE PRACTITIONER

## 2022-10-14 PROCEDURE — 3074F SYST BP LT 130 MM HG: CPT | Mod: CPTII,S$GLB,, | Performed by: NURSE PRACTITIONER

## 2022-10-14 PROCEDURE — 3074F PR MOST RECENT SYSTOLIC BLOOD PRESSURE < 130 MM HG: ICD-10-PCS | Mod: CPTII,S$GLB,, | Performed by: NURSE PRACTITIONER

## 2022-10-14 PROCEDURE — 3078F PR MOST RECENT DIASTOLIC BLOOD PRESSURE < 80 MM HG: ICD-10-PCS | Mod: CPTII,S$GLB,, | Performed by: NURSE PRACTITIONER

## 2022-10-17 ENCOUNTER — OFFICE VISIT (OUTPATIENT)
Dept: ORTHOPEDICS | Facility: CLINIC | Age: 52
End: 2022-10-17
Payer: COMMERCIAL

## 2022-10-17 DIAGNOSIS — M77.12 LATERAL EPICONDYLITIS OF LEFT ELBOW: Primary | ICD-10-CM

## 2022-10-17 DIAGNOSIS — M17.11 PRIMARY OSTEOARTHRITIS OF RIGHT KNEE: ICD-10-CM

## 2022-10-17 PROCEDURE — 99999 PR PBB SHADOW E&M-EST. PATIENT-LVL II: ICD-10-PCS | Mod: PBBFAC,,, | Performed by: ORTHOPAEDIC SURGERY

## 2022-10-17 PROCEDURE — 99999 PR PBB SHADOW E&M-EST. PATIENT-LVL II: CPT | Mod: PBBFAC,,, | Performed by: ORTHOPAEDIC SURGERY

## 2022-10-17 PROCEDURE — 99213 OFFICE O/P EST LOW 20 MIN: CPT | Mod: S$GLB,,, | Performed by: ORTHOPAEDIC SURGERY

## 2022-10-17 PROCEDURE — 99213 PR OFFICE/OUTPT VISIT, EST, LEVL III, 20-29 MIN: ICD-10-PCS | Mod: S$GLB,,, | Performed by: ORTHOPAEDIC SURGERY

## 2022-10-17 NOTE — PROGRESS NOTES
Orthopedic Surgery Progress Note     Assessment: 51 y.o. female with bilateral lateral epicondylitis and early knee arthritis     I explained my diagnostic impression and the reasoning behind it in detail, using layman's terms.      Plan:   - Continue HEP  - Continue diclofenac PRN     All questions were answered in detail. The patient is in full agreement with the treatment plan and will proceed accordingly.    Chief Complaint   Patient presents with    Right Elbow - Pain      Initial visit (5/26/22): Cheryl Espinosa is a 51 y.o. female who presents today complaining of Pain of the Right Elbow     Duration of symptoms: about 5 months. She thinks she has had it on and off over the years but thought it was associated with carpal tunnel   Trauma or new activity: no, maybe she was cleaning out her house around the time that it started  Pain is constant   Is pointing to her shoulder as site of pain with radiation down the arm - this is worse at night   Pain is the worse over the lateral elbow   Aggravating factors: worse at night (wakes her up), does have daytime pain with activity. Worse if she is not moving for long periods of time   Has pain in the shoulder and elbow with reaching overhead, reaching behind her back   Relieving factors: rest   Radicular symptoms:occ neck pain  Has n/t down the entire arm into the hands. Has been treated for carpal tunnel in the past with Dr. Barraza. Has had multiple injections.   Prior treatment:  prescription NSAIDs with improvement in pain.   Has not done wrist braces. Has tried doing some stretches of the shoulders at home.  [place] gave her a HEP which has not been doing for the last three weeks. She recently started a new job and has been stressed about it.   Pain does interfere with sleep and activities of daily living .  Does have some pain in both arms but worse on the right     8/18/22  Pain over lateral elbow mostly resolved - sometimes has pain if she carries something  or does a lot of activity   Now has some pain in the extensor muscle belly - PT thinks this is related to muscle soreness as she is able to increase activity. I agree this is most likely   Now pain I always below 5   Not at her stressful job anymore     Knee is episodic, usually starts with flexion of the knee. Is unable to bear weight on the knee or straighten it out   Better with stretching, resting for a moment and then it goes away    10/17/22  Knee and elbow feeling better   Still has some pain     This is the extent of the patient's complaints at this time.     Hand dominance: Desk worker   Has ergonomic set up at work        Review of patient's allergies indicates:   Allergen Reactions    Doxycycline Anaphylaxis, Nausea And Vomiting and Other (See Comments)    Medrol [methylprednisolone] Shortness Of Breath and Rash    Nsaids (non-steroidal anti-inflammatory drug) Nausea And Vomiting     Nausea          Current Outpatient Medications:     diclofenac (VOLTAREN) 75 MG EC tablet, Take 1 tablet (75 mg total) by mouth 2 (two) times daily as needed (back pain)., Disp: 60 tablet, Rfl: 2    omeprazole (PRILOSEC) 40 MG capsule, Take 1 capsule (40 mg total) by mouth once daily., Disp: 30 capsule, Rfl: 11    VALIUM 5 mg tablet, Take 1 tablet (5 mg total) by mouth every 12 (twelve) hours as needed (back pain)., Disp: 60 tablet, Rfl: 1    Physical Exam:   Pain level: elbow 1-2/10    General: Patient is alert, awake and oriented to time, place and person. Mood and affect are appropriate.  Patient does not appear to be in any distress, denies any constitutional symptoms and appears stated age.   HEENT:  Pupils are equal and round, sclera are not injected. External examination of ears and nose reveals no abnormalities. Cranial nerves II-X are grossly intact  Skin: no rashes, abrasions or open wounds on the affected extremity   Resp:  No respiratory distress or audible wheezing   CV: 2+  pulses, all extremities warm and well  perfused   Right Upper extremity    Pain over ECRB bilaterally, much more tender on R than L   Pain with resisted wrist and digit extension   Does report numbness on R with tinels over PIN but not in radial n dist - reports numbness in median n dist   No numbness/tingling with tinels over PIN on L   LTSI m/u/r  2+ RP  + EPL, IO, FDS, FDP     Right knee   TTP over the medial joint line   Ltsi s/s/sp/dp/t  + ehl/fhl/ta/gs  2+ DP     Imaging: no new     This note was created by combination of typed  and M-Modal dictation. Transcription and phonetic errors may be present.  If there are any questions, please contact me.    Past Medical History:   Diagnosis Date    Abnormal Pap smear of cervix     Carpal tunnel syndrome     GERD (gastroesophageal reflux disease)     History of degenerative disc disease     Metatarsalgia        Active Problem List with Overview Notes    Diagnosis Date Noted    At high risk for breast cancer 09/30/2022    Pain of both elbows 07/01/2022    Decreased strength 07/01/2022    Trigger point 07/01/2022    Chronic pain of right knee 10/06/2021    Chronic bilateral low back pain with bilateral sciatica 08/12/2021    Decreased range of motion of intervertebral discs of lumbar spine 08/12/2021    Weakness of both lower extremities 08/12/2021    Posture imbalance 08/12/2021    Osteopenia 08/05/2016     From depo-provera.  Needs DEXA scan every 3 years.       History of endometriosis 08/05/2016     See Dr. Herring to complete colonoscopy as she had extensive endometriosis that involved her colon     Located in uterus, ovaries, colon.       GERD without esophagitis 08/05/2016       Past Surgical History:   Procedure Laterality Date    COLPOSCOPY         Family History   Problem Relation Age of Onset    Breast cancer Maternal Grandmother     Breast cancer Maternal Aunt     Ovarian cancer Neg Hx     Colon cancer Neg Hx     Cancer Neg Hx

## 2022-10-27 ENCOUNTER — HOSPITAL ENCOUNTER (OUTPATIENT)
Dept: RADIOLOGY | Facility: HOSPITAL | Age: 52
Discharge: HOME | End: 2022-10-27
Attending: NURSE PRACTITIONER
Payer: COMMERCIAL

## 2022-10-27 DIAGNOSIS — Z91.89 AT HIGH RISK FOR BREAST CANCER: ICD-10-CM

## 2022-10-27 PROCEDURE — 77049 MRI BREAST W/WO CONTRAST, W/CAD, BILATERAL: ICD-10-PCS | Mod: 26,,, | Performed by: RADIOLOGY

## 2022-10-27 PROCEDURE — A9577 INJ MULTIHANCE: HCPCS | Performed by: NURSE PRACTITIONER

## 2022-10-27 PROCEDURE — 77049 MRI BREAST C-+ W/CAD BI: CPT | Mod: TC

## 2022-10-27 PROCEDURE — 25500020 PHARM REV CODE 255: Performed by: NURSE PRACTITIONER

## 2022-10-27 PROCEDURE — 77049 MRI BREAST C-+ W/CAD BI: CPT | Mod: 26,,, | Performed by: RADIOLOGY

## 2022-10-27 RX ORDER — DICLOFENAC SODIUM 75 MG/1
75 TABLET, DELAYED RELEASE ORAL 2 TIMES DAILY PRN
Qty: 60 TABLET | Refills: 2 | OUTPATIENT
Start: 2022-10-27

## 2022-10-27 RX ADMIN — GADOBENATE DIMEGLUMINE 20 ML: 529 INJECTION, SOLUTION INTRAVENOUS at 03:10

## 2022-10-29 RX ORDER — DICLOFENAC SODIUM 75 MG/1
75 TABLET, DELAYED RELEASE ORAL 2 TIMES DAILY PRN
Qty: 60 TABLET | Refills: 2 | Status: CANCELLED | OUTPATIENT
Start: 2022-10-29

## 2022-10-31 ENCOUNTER — PATIENT MESSAGE (OUTPATIENT)
Dept: ORTHOPEDICS | Facility: CLINIC | Age: 52
End: 2022-10-31
Payer: COMMERCIAL

## 2022-10-31 ENCOUNTER — TELEPHONE (OUTPATIENT)
Dept: ORTHOPEDICS | Facility: CLINIC | Age: 52
End: 2022-10-31
Payer: COMMERCIAL

## 2022-10-31 RX ORDER — DICLOFENAC SODIUM 75 MG/1
75 TABLET, DELAYED RELEASE ORAL 2 TIMES DAILY PRN
Qty: 60 TABLET | Refills: 2 | Status: SHIPPED | OUTPATIENT
Start: 2022-10-31 | End: 2023-03-12 | Stop reason: SDUPTHER

## 2022-10-31 RX ORDER — DICLOFENAC SODIUM 75 MG/1
75 TABLET, DELAYED RELEASE ORAL 2 TIMES DAILY PRN
Qty: 60 TABLET | Refills: 2 | Status: CANCELLED | OUTPATIENT
Start: 2022-10-31

## 2022-10-31 NOTE — TELEPHONE ENCOUNTER
"Successful call placed to patient in reference to her concern about the medication refill. Patient reports her prescription was "denied" according to a representative from the [facility] pharmacy. Pt is curious about the medication denial process through the pharmacy, since no denials are indicated in the patient record at this time. This RN notified the patient that only one refill request was received for the Diclofenac this afternoon; which has not been denied. Dr. Valencia is aware of the concern and has submitted the refill request. Patient verbalized understanding of Dr. Valencia's "approval" and will await a call/notification from the pharmacy when the medication is ready for . No other concerns expressed about the clinic or clinic personnel.                     "

## 2022-11-01 ENCOUNTER — HOSPITAL ENCOUNTER (OUTPATIENT)
Dept: RADIOLOGY | Facility: CLINIC | Age: 52
Discharge: HOME | End: 2022-11-01
Attending: NURSE PRACTITIONER
Payer: COMMERCIAL

## 2022-11-01 DIAGNOSIS — M85.80 OSTEOPENIA, UNSPECIFIED LOCATION: ICD-10-CM

## 2022-11-01 PROCEDURE — 77080 DXA BONE DENSITY AXIAL: CPT | Mod: TC,PO

## 2022-11-01 PROCEDURE — 77080 DEXA BONE DENSITY SPINE HIP: ICD-10-PCS | Mod: 26,,, | Performed by: INTERNAL MEDICINE

## 2022-11-01 PROCEDURE — 77080 DXA BONE DENSITY AXIAL: CPT | Mod: 26,,, | Performed by: INTERNAL MEDICINE

## 2022-11-08 ENCOUNTER — PATIENT MESSAGE (OUTPATIENT)
Dept: GASTROENTEROLOGY | Facility: CLINIC | Age: 52
End: 2022-11-08
Payer: COMMERCIAL

## 2022-11-08 ENCOUNTER — TELEPHONE (OUTPATIENT)
Dept: GASTROENTEROLOGY | Facility: CLINIC | Age: 52
End: 2022-11-08
Payer: COMMERCIAL

## 2022-11-08 ENCOUNTER — PATIENT MESSAGE (OUTPATIENT)
Dept: FAMILY MEDICINE | Facility: CLINIC | Age: 52
End: 2022-11-08
Payer: COMMERCIAL

## 2022-11-21 ENCOUNTER — OFFICE VISIT (OUTPATIENT)
Dept: GASTROENTEROLOGY | Facility: CLINIC | Age: 52
End: 2022-11-21
Payer: COMMERCIAL

## 2022-11-21 ENCOUNTER — LAB VISIT (OUTPATIENT)
Dept: LAB | Facility: HOSPITAL | Age: 52
End: 2022-11-21
Attending: INTERNAL MEDICINE
Payer: COMMERCIAL

## 2022-11-21 VITALS
WEIGHT: 196.31 LBS | HEART RATE: 81 BPM | HEIGHT: 61 IN | DIASTOLIC BLOOD PRESSURE: 74 MMHG | SYSTOLIC BLOOD PRESSURE: 115 MMHG | BODY MASS INDEX: 37.06 KG/M2

## 2022-11-21 DIAGNOSIS — R10.32 LLQ ABDOMINAL PAIN: Primary | ICD-10-CM

## 2022-11-21 DIAGNOSIS — R10.32 LLQ ABDOMINAL PAIN: ICD-10-CM

## 2022-11-21 LAB
BILIRUB UR QL STRIP: NEGATIVE
CLARITY UR: CLEAR
COLOR UR: YELLOW
GLUCOSE UR QL STRIP: NEGATIVE
HGB UR QL STRIP: NEGATIVE
KETONES UR QL STRIP: NEGATIVE
LEUKOCYTE ESTERASE UR QL STRIP: NEGATIVE
NITRITE UR QL STRIP: NEGATIVE
PH UR STRIP: 7 [PH] (ref 5–8)
PROT UR QL STRIP: ABNORMAL
SP GR UR STRIP: 1.02 (ref 1–1.03)
URN SPEC COLLECT METH UR: ABNORMAL
UROBILINOGEN UR STRIP-ACNC: ABNORMAL EU/DL

## 2022-11-21 PROCEDURE — 3074F SYST BP LT 130 MM HG: CPT | Mod: CPTII,S$GLB,, | Performed by: INTERNAL MEDICINE

## 2022-11-21 PROCEDURE — 3078F PR MOST RECENT DIASTOLIC BLOOD PRESSURE < 80 MM HG: ICD-10-PCS | Mod: CPTII,S$GLB,, | Performed by: INTERNAL MEDICINE

## 2022-11-21 PROCEDURE — 3008F BODY MASS INDEX DOCD: CPT | Mod: CPTII,S$GLB,, | Performed by: INTERNAL MEDICINE

## 2022-11-21 PROCEDURE — 3078F DIAST BP <80 MM HG: CPT | Mod: CPTII,S$GLB,, | Performed by: INTERNAL MEDICINE

## 2022-11-21 PROCEDURE — 1159F PR MEDICATION LIST DOCUMENTED IN MEDICAL RECORD: ICD-10-PCS | Mod: CPTII,S$GLB,, | Performed by: INTERNAL MEDICINE

## 2022-11-21 PROCEDURE — 99999 PR PBB SHADOW E&M-EST. PATIENT-LVL III: ICD-10-PCS | Mod: PBBFAC,,, | Performed by: INTERNAL MEDICINE

## 2022-11-21 PROCEDURE — 3008F PR BODY MASS INDEX (BMI) DOCUMENTED: ICD-10-PCS | Mod: CPTII,S$GLB,, | Performed by: INTERNAL MEDICINE

## 2022-11-21 PROCEDURE — 3074F PR MOST RECENT SYSTOLIC BLOOD PRESSURE < 130 MM HG: ICD-10-PCS | Mod: CPTII,S$GLB,, | Performed by: INTERNAL MEDICINE

## 2022-11-21 PROCEDURE — 99999 PR PBB SHADOW E&M-EST. PATIENT-LVL III: CPT | Mod: PBBFAC,,, | Performed by: INTERNAL MEDICINE

## 2022-11-21 PROCEDURE — 99214 OFFICE O/P EST MOD 30 MIN: CPT | Mod: S$GLB,,, | Performed by: INTERNAL MEDICINE

## 2022-11-21 PROCEDURE — 1160F RVW MEDS BY RX/DR IN RCRD: CPT | Mod: CPTII,S$GLB,, | Performed by: INTERNAL MEDICINE

## 2022-11-21 PROCEDURE — 3044F PR MOST RECENT HEMOGLOBIN A1C LEVEL <7.0%: ICD-10-PCS | Mod: CPTII,S$GLB,, | Performed by: INTERNAL MEDICINE

## 2022-11-21 PROCEDURE — 3044F HG A1C LEVEL LT 7.0%: CPT | Mod: CPTII,S$GLB,, | Performed by: INTERNAL MEDICINE

## 2022-11-21 PROCEDURE — 81003 URINALYSIS AUTO W/O SCOPE: CPT | Performed by: INTERNAL MEDICINE

## 2022-11-21 PROCEDURE — 99214 PR OFFICE/OUTPT VISIT, EST, LEVL IV, 30-39 MIN: ICD-10-PCS | Mod: S$GLB,,, | Performed by: INTERNAL MEDICINE

## 2022-11-21 PROCEDURE — 1159F MED LIST DOCD IN RCRD: CPT | Mod: CPTII,S$GLB,, | Performed by: INTERNAL MEDICINE

## 2022-11-21 PROCEDURE — 1160F PR REVIEW ALL MEDS BY PRESCRIBER/CLIN PHARMACIST DOCUMENTED: ICD-10-PCS | Mod: CPTII,S$GLB,, | Performed by: INTERNAL MEDICINE

## 2022-11-21 RX ORDER — SODIUM, POTASSIUM,MAG SULFATES 17.5-3.13G
1 SOLUTION, RECONSTITUTED, ORAL ORAL DAILY
Qty: 354 ML | Refills: 0 | Status: SHIPPED | OUTPATIENT
Start: 2022-11-21 | End: 2022-11-23

## 2022-11-21 NOTE — PROGRESS NOTES
Subjective:       Patient ID: Cheryl Espinosa is a 52 y.o. female.    Chief Complaint: Abdominal Pain (Left side)      HPI:   Abdominal Pain  This is a recurrent problem. The onset quality is undetermined. The pain is located in the LLQ. The pain is at a severity of 4/10. The quality of the pain is sharp. The abdominal pain does not radiate. Pertinent negatives include no arthralgias, constipation or fever. Nothing aggravates the pain. The pain is relieved by Nothing. She has tried nothing for the symptoms.   Last colonoscopy was 2009.  She is concerned about the change in urine color. No hematuria.  Review of Systems   Constitutional:  Negative for appetite change and fever.   HENT:  Negative for sore throat and trouble swallowing.    Eyes:  Negative for photophobia and visual disturbance.   Respiratory:  Negative for wheezing.    Cardiovascular:  Negative for chest pain and palpitations.   Gastrointestinal:  Positive for abdominal pain. Negative for constipation.        See HPI for details   Musculoskeletal:  Negative for arthralgias, joint swelling and neck pain.   Integumentary:  Negative for rash and wound.   Neurological:  Negative for dizziness, tremors and weakness.   Psychiatric/Behavioral:  Negative for behavioral problems and suicidal ideas.        Objective:      Physical Exam  Eyes:      Pupils: Pupils are equal, round, and reactive to light.   Cardiovascular:      Heart sounds: Normal heart sounds.   Pulmonary:      Effort: Pulmonary effort is normal.      Breath sounds: Normal breath sounds.   Abdominal:      Palpations: Abdomen is soft. There is no mass.      Tenderness: There is no abdominal tenderness. There is no guarding.   Musculoskeletal:         General: Normal range of motion.      Cervical back: Neck supple.   Lymphadenopathy:      Cervical: No cervical adenopathy.   Skin:     General: Skin is warm.      Findings: No rash.   Neurological:      Mental Status: She is alert and oriented  to person, place, and time.       Assessment:       1. [facility] abdominal pain        Plan:       Cheryl was seen today for abdominal pain.    Diagnoses and all orders for this visit:    [facility] abdominal pain  -     Urinalysis; Future  -     Case Request Endoscopy: COLONOSCOPY    Other orders  -     sodium,potassium,mag sulfates (SUPREP BOWEL PREP KIT) 17.5-3.13-1.6 gram SolR; Take 177 mLs by mouth once daily. for 2 days

## 2022-11-21 NOTE — H&P (VIEW-ONLY)
Subjective:       Patient ID: Cheryl Espinosa is a 52 y.o. female.    Chief Complaint: Abdominal Pain (Left side)      HPI:   Abdominal Pain  This is a recurrent problem. The onset quality is undetermined. The pain is located in the LLQ. The pain is at a severity of 4/10. The quality of the pain is sharp. The abdominal pain does not radiate. Pertinent negatives include no arthralgias, constipation or fever. Nothing aggravates the pain. The pain is relieved by Nothing. She has tried nothing for the symptoms.   Last colonoscopy was 2009.  She is concerned about the change in urine color. No hematuria.  Review of Systems   Constitutional:  Negative for appetite change and fever.   HENT:  Negative for sore throat and trouble swallowing.    Eyes:  Negative for photophobia and visual disturbance.   Respiratory:  Negative for wheezing.    Cardiovascular:  Negative for chest pain and palpitations.   Gastrointestinal:  Positive for abdominal pain. Negative for constipation.        See HPI for details   Musculoskeletal:  Negative for arthralgias, joint swelling and neck pain.   Integumentary:  Negative for rash and wound.   Neurological:  Negative for dizziness, tremors and weakness.   Psychiatric/Behavioral:  Negative for behavioral problems and suicidal ideas.        Objective:      Physical Exam  Eyes:      Pupils: Pupils are equal, round, and reactive to light.   Cardiovascular:      Heart sounds: Normal heart sounds.   Pulmonary:      Effort: Pulmonary effort is normal.      Breath sounds: Normal breath sounds.   Abdominal:      Palpations: Abdomen is soft. There is no mass.      Tenderness: There is no abdominal tenderness. There is no guarding.   Musculoskeletal:         General: Normal range of motion.      Cervical back: Neck supple.   Lymphadenopathy:      Cervical: No cervical adenopathy.   Skin:     General: Skin is warm.      Findings: No rash.   Neurological:      Mental Status: She is alert and oriented  to person, place, and time.       Assessment:       1. [facility] abdominal pain        Plan:       Cheryl was seen today for abdominal pain.    Diagnoses and all orders for this visit:    [facility] abdominal pain  -     Urinalysis; Future  -     Case Request Endoscopy: COLONOSCOPY    Other orders  -     sodium,potassium,mag sulfates (SUPREP BOWEL PREP KIT) 17.5-3.13-1.6 gram SolR; Take 177 mLs by mouth once daily. for 2 days

## 2022-11-28 ENCOUNTER — TELEPHONE (OUTPATIENT)
Dept: GASTROENTEROLOGY | Facility: CLINIC | Age: 52
End: 2022-11-28
Payer: COMMERCIAL

## 2022-11-29 ENCOUNTER — ANESTHESIA EVENT (OUTPATIENT)
Dept: ENDOSCOPY | Facility: HOSPITAL | Age: 52
End: 2022-11-29
Payer: COMMERCIAL

## 2022-11-29 RX ORDER — SODIUM CHLORIDE 9 MG/ML
INJECTION, SOLUTION INTRAVENOUS CONTINUOUS
Status: CANCELLED | OUTPATIENT
Start: 2022-11-29

## 2022-11-30 ENCOUNTER — HOSPITAL ENCOUNTER (OUTPATIENT)
Facility: HOSPITAL | Age: 52
Discharge: HOME | End: 2022-11-30
Attending: INTERNAL MEDICINE | Admitting: INTERNAL MEDICINE
Payer: COMMERCIAL

## 2022-11-30 ENCOUNTER — ANESTHESIA (OUTPATIENT)
Dept: ENDOSCOPY | Facility: HOSPITAL | Age: 52
End: 2022-11-30
Payer: COMMERCIAL

## 2022-11-30 VITALS
RESPIRATION RATE: 16 BRPM | HEART RATE: 62 BPM | SYSTOLIC BLOOD PRESSURE: 118 MMHG | OXYGEN SATURATION: 99 % | TEMPERATURE: 98 F | DIASTOLIC BLOOD PRESSURE: 63 MMHG

## 2022-11-30 DIAGNOSIS — Z12.11 COLON CANCER SCREENING: ICD-10-CM

## 2022-11-30 PROCEDURE — 37000009 HC ANESTHESIA EA ADD 15 MINS: Performed by: INTERNAL MEDICINE

## 2022-11-30 PROCEDURE — D9220A PRA ANESTHESIA: ICD-10-PCS | Mod: 33,ANES,, | Performed by: ANESTHESIOLOGY

## 2022-11-30 PROCEDURE — 25000003 PHARM REV CODE 250: Performed by: INTERNAL MEDICINE

## 2022-11-30 PROCEDURE — 45385 COLONOSCOPY W/LESION REMOVAL: CPT | Mod: PT | Performed by: INTERNAL MEDICINE

## 2022-11-30 PROCEDURE — 27201089 HC SNARE, DISP (ANY): Performed by: INTERNAL MEDICINE

## 2022-11-30 PROCEDURE — 45385 COLONOSCOPY W/LESION REMOVAL: CPT | Mod: 33,,, | Performed by: INTERNAL MEDICINE

## 2022-11-30 PROCEDURE — 25000003 PHARM REV CODE 250: Performed by: NURSE ANESTHETIST, CERTIFIED REGISTERED

## 2022-11-30 PROCEDURE — D9220A PRA ANESTHESIA: ICD-10-PCS | Mod: 33,CRNA,, | Performed by: NURSE ANESTHETIST, CERTIFIED REGISTERED

## 2022-11-30 PROCEDURE — 45385 PR COLONOSCOPY,REMV LESN,SNARE: ICD-10-PCS | Mod: 33,,, | Performed by: INTERNAL MEDICINE

## 2022-11-30 PROCEDURE — 88305 TISSUE EXAM BY PATHOLOGIST: CPT | Performed by: PATHOLOGY

## 2022-11-30 PROCEDURE — 88305 TISSUE EXAM BY PATHOLOGIST: ICD-10-PCS | Mod: 26,,, | Performed by: PATHOLOGY

## 2022-11-30 PROCEDURE — D9220A PRA ANESTHESIA: Mod: 33,CRNA,, | Performed by: NURSE ANESTHETIST, CERTIFIED REGISTERED

## 2022-11-30 PROCEDURE — D9220A PRA ANESTHESIA: Mod: 33,ANES,, | Performed by: ANESTHESIOLOGY

## 2022-11-30 PROCEDURE — 37000008 HC ANESTHESIA 1ST 15 MINUTES: Performed by: INTERNAL MEDICINE

## 2022-11-30 PROCEDURE — 88305 TISSUE EXAM BY PATHOLOGIST: CPT | Mod: 26,,, | Performed by: PATHOLOGY

## 2022-11-30 PROCEDURE — 63600175 PHARM REV CODE 636 W HCPCS: Performed by: NURSE ANESTHETIST, CERTIFIED REGISTERED

## 2022-11-30 RX ORDER — LIDOCAINE HYDROCHLORIDE 20 MG/ML
INJECTION, SOLUTION EPIDURAL; INFILTRATION; INTRACAUDAL; PERINEURAL
Status: DISCONTINUED
Start: 2022-11-30 | End: 2022-11-30 | Stop reason: HOSPADM

## 2022-11-30 RX ORDER — PROPOFOL 10 MG/ML
VIAL (ML) INTRAVENOUS
Status: DISCONTINUED | OUTPATIENT
Start: 2022-11-30 | End: 2022-11-30

## 2022-11-30 RX ORDER — LIDOCAINE HYDROCHLORIDE 20 MG/ML
INJECTION INTRAVENOUS
Status: DISCONTINUED | OUTPATIENT
Start: 2022-11-30 | End: 2022-11-30

## 2022-11-30 RX ORDER — PROPOFOL 10 MG/ML
INJECTION, EMULSION INTRAVENOUS
Status: DISCONTINUED
Start: 2022-11-30 | End: 2022-11-30 | Stop reason: HOSPADM

## 2022-11-30 RX ORDER — SODIUM CHLORIDE 9 MG/ML
INJECTION, SOLUTION INTRAVENOUS CONTINUOUS
Status: DISCONTINUED | OUTPATIENT
Start: 2022-11-30 | End: 2022-11-30 | Stop reason: HOSPADM

## 2022-11-30 RX ADMIN — LIDOCAINE HYDROCHLORIDE 40 MG: 20 INJECTION, SOLUTION INTRAVENOUS at 10:11

## 2022-11-30 RX ADMIN — PROPOFOL 60 MG: 10 INJECTION, EMULSION INTRAVENOUS at 10:11

## 2022-11-30 RX ADMIN — PROPOFOL 80 MG: 10 INJECTION, EMULSION INTRAVENOUS at 10:11

## 2022-11-30 RX ADMIN — LIDOCAINE HYDROCHLORIDE 100 MG: 20 INJECTION, SOLUTION INTRAVENOUS at 10:11

## 2022-11-30 RX ADMIN — SODIUM CHLORIDE: 0.9 INJECTION, SOLUTION INTRAVENOUS at 09:11

## 2022-11-30 NOTE — ANESTHESIA PREPROCEDURE EVALUATION
11/30/2022  Cheryl Espinosa is a 52 y.o., female.      Pre-op Assessment     I have reviewed the Nursing Notes.       Review of Systems  Anesthesia Hx:  Takes a long time to fully wake up after surgery. No ICU stay, however   Social:  Non-Smoker    Cardiovascular:   Denies Pacemaker.  Denies CABG/stent.     Pulmonary:  Pulmonary Normal    Renal/:  Renal/ Normal     Hepatic/GI:   Bowel Prep. Denies PUD. GERD Denies Liver Disease. Denies Hepatitis.    Musculoskeletal:   Lower extremity weakness and balance issues   Neurological:   Denies CVA. Denies Seizures.    Endocrine:   Denies Diabetes. Denies Hypothyroidism. Denies Hyperthyroidism.  Obesity / BMI > 30Denies Morbid Obesity / BMI > 40      Physical Exam  General: Well nourished, Cooperative, Alert and Oriented    Airway:  Mallampati: III   Mouth Opening: Normal  TM Distance: Normal  Tongue: Normal  Neck ROM: Normal ROM    Dental:  Intact        Anesthesia Plan  Type of Anesthesia, risks & benefits discussed:    Anesthesia Type: Gen Natural Airway  Intra-op Monitoring Plan: Standard ASA Monitors  Induction:  IV  Informed Consent: Informed consent signed with the Patient and all parties understand the risks and agree with anesthesia plan.  All questions answered.   ASA Score: 2  Day of Surgery Review of History & Physical: H&P Update referred to the surgeon/provider.    Ready For Surgery From Anesthesia Perspective.     .

## 2022-11-30 NOTE — TRANSFER OF CARE
Anesthesia Transfer of Care Note    Patient: Cheryl Espinosa    Procedure(s) Performed: Procedure(s) (LRB):  COLONOSCOPY (N/A)    Patient location: GI    Anesthesia Type: general    Transport from OR: Transported from OR on room air with adequate spontaneous ventilation    Post pain: adequate analgesia    Post assessment: no apparent anesthetic complications and tolerated procedure well    Post vital signs: stable    Level of consciousness: sedated    Nausea/Vomiting: no nausea/vomiting    Complications: none    Transfer of care protocol was followed      Last vitals:   Visit Vitals  BP (!) 109/58 (BP Location: Left arm, Patient Position: Lying)   Pulse 70   Temp 36.5 °C (97.7 °F) (Oral)   Resp 15   LMP 06/09/2019 (Exact Date)   SpO2 96%   Breastfeeding No

## 2022-11-30 NOTE — PROVATION PATIENT INSTRUCTIONS
Discharge Summary/Instructions after an Endoscopic Procedure  Patient Name: Cheryl Espinosa  Patient MRN: [id number]  Patient YOB: 1970 Wednesday, November 30, 2022  Jennifer Dupont MD  Dear patient,  As a result of recent federal legislation (The Federal Cures Act), you may   receive lab or pathology results from your procedure in your [facility]   account before your physician is able to contact you. Your physician or   their representative will relay the results to you with their   recommendations at their soonest availability.  Thank you,  RESTRICTIONS:  During your procedure today, you received medications for sedation.  These   medications may affect your judgment, balance and coordination.  Therefore,   for 24 hours, you have the following restrictions:   - DO NOT drive a car, operate machinery, make legal/financial decisions,   sign important papers or drink alcohol.    ACTIVITY:  Today: no heavy lifting, straining or running due to procedural   sedation/anesthesia.  The following day: return to full activity including work.  DIET:  Eat and drink normally unless instructed otherwise.     TREATMENT FOR COMMON SIDE EFFECTS:  - Mild abdominal pain, nausea, belching, bloating or excessive gas:  rest,   eat lightly and use a heating pad.  - Sore Throat: treat with throat lozenges and/or gargle with warm salt   water.  - Because air was used during the procedure, expelling large amounts of air   from your rectum or belching is normal.  - If a bowel prep was taken, you may not have a bowel movement for 1-3 days.    This is normal.  SYMPTOMS TO WATCH FOR AND REPORT TO YOUR PHYSICIAN:  1. Abdominal pain or bloating, other than gas cramps.  2. Chest pain.  3. Back pain.  4. Signs of infection such as: chills or fever occurring within 24 hours   after the procedure.  5. Rectal bleeding, which would show as bright red, maroon, or black stools.   (A tablespoon of blood from the rectum is not serious,  especially if   hemorrhoids are present.)  6. Vomiting.  7. Weakness or dizziness.  GO DIRECTLY TO THE NEAREST EMERGENCY ROOM IF YOU HAVE ANY OF THE FOLLOWING:      Difficulty breathing              Chills and/or fever over 101 F   Persistent vomiting and/or vomiting blood   Severe abdominal pain   Severe chest pain   Black, tarry stools   Bleeding- more than one tablespoon   Any other symptom or condition that you feel may need urgent attention  Your doctor recommends these additional instructions:  If any biopsies were taken, your doctors clinic will contact you in 1 to 2   weeks with any results.  - Discharge patient to home.   - Repeat colonoscopy in 10 years for screening purposes.  For questions, problems or results please call your physician - Jennifer Dupont MD at Work:  [phone].  [facility] can be reached at [phone]     IF A COMPLICATION OR EMERGENCY SITUATION ARISES AND YOU ARE UNABLE TO REACH   YOUR PHYSICIAN - GO DIRECTLY TO THE EMERGENCY ROOM.  Jennifer Dupont MD  11/30/2022 10:41:00 AM  This report has been verified and signed electronically.  Dear patient,  As a result of recent federal legislation (The Federal Cures Act), you may   receive lab or pathology results from your procedure in your [facility]   account before your physician is able to contact you. Your physician or   their representative will relay the results to you with their   recommendations at their soonest availability.  Thank you,  PROVATION

## 2022-11-30 NOTE — ANESTHESIA POSTPROCEDURE EVALUATION
Anesthesia Post Evaluation    Patient: Cheryl Espinosa    Procedure(s) Performed: Procedure(s) (LRB):  COLONOSCOPY (N/A)    Final Anesthesia Type: general      Patient location during evaluation: GI PACU  Patient participation: Yes- Able to Participate  Level of consciousness: awake and alert  Post-procedure vital signs: reviewed and stable  Pain management: adequate  Airway patency: patent    PONV status at discharge: No PONV  Anesthetic complications: no      Cardiovascular status: blood pressure returned to baseline and hemodynamically stable  Respiratory status: unassisted and spontaneous ventilation  Hydration status: euvolemic  Follow-up not needed.          Vitals Value Taken Time   /63 11/30/22 1054   Temp 36.5 °C (97.7 °F) 11/30/22 1024   Pulse 62 11/30/22 1054   Resp 16 11/30/22 1054   SpO2 99 % 11/30/22 1054         Event Time   Out of Recovery 11:13:28         Pain/Krystal Score: Krystal Score: 10 (11/30/2022 10:54 AM)

## 2022-12-07 LAB
FINAL PATHOLOGIC DIAGNOSIS: NORMAL
Lab: NORMAL

## 2023-02-08 DIAGNOSIS — Z11.59 NEED FOR HEPATITIS C SCREENING TEST: ICD-10-CM

## 2023-02-23 ENCOUNTER — PATIENT MESSAGE (OUTPATIENT)
Dept: FAMILY MEDICINE | Facility: CLINIC | Age: 53
End: 2023-02-23
Payer: COMMERCIAL

## 2023-03-13 RX ORDER — DICLOFENAC SODIUM 75 MG/1
75 TABLET, DELAYED RELEASE ORAL 2 TIMES DAILY PRN
Qty: 60 TABLET | Refills: 2 | Status: SHIPPED | OUTPATIENT
Start: 2023-03-13 | End: 2023-06-19 | Stop reason: SDUPTHER

## 2023-03-21 ENCOUNTER — TELEPHONE (OUTPATIENT)
Dept: FAMILY MEDICINE | Facility: CLINIC | Age: 53
End: 2023-03-21
Payer: COMMERCIAL

## 2023-03-21 ENCOUNTER — PATIENT MESSAGE (OUTPATIENT)
Dept: FAMILY MEDICINE | Facility: CLINIC | Age: 53
End: 2023-03-21
Payer: COMMERCIAL

## 2023-04-03 ENCOUNTER — PATIENT MESSAGE (OUTPATIENT)
Dept: FAMILY MEDICINE | Facility: CLINIC | Age: 53
End: 2023-04-03
Payer: MEDICAID

## 2023-05-01 ENCOUNTER — PATIENT MESSAGE (OUTPATIENT)
Dept: FAMILY MEDICINE | Facility: CLINIC | Age: 53
End: 2023-05-01
Payer: MEDICAID

## 2023-05-01 DIAGNOSIS — S02.2XXG CLOSED FRACTURE OF NASAL BONE WITH DELAYED HEALING, SUBSEQUENT ENCOUNTER: Primary | ICD-10-CM

## 2023-05-03 ENCOUNTER — TELEPHONE (OUTPATIENT)
Dept: FAMILY MEDICINE | Facility: CLINIC | Age: 53
End: 2023-05-03
Payer: MEDICAID

## 2023-05-03 ENCOUNTER — PATIENT MESSAGE (OUTPATIENT)
Dept: FAMILY MEDICINE | Facility: CLINIC | Age: 53
End: 2023-05-03
Payer: MEDICAID

## 2023-05-03 DIAGNOSIS — J34.9 SINUS PROBLEM: Primary | ICD-10-CM

## 2023-05-03 NOTE — TELEPHONE ENCOUNTER
----- Message from Kaden Rojo sent at 5/3/2023  7:15 AM CDT -----  Regarding: release of records paper work  Pt left paper work on the 1st floor pt adv dr requested she come in and fill out release of information for procedure

## 2023-05-05 ENCOUNTER — PATIENT MESSAGE (OUTPATIENT)
Dept: FAMILY MEDICINE | Facility: CLINIC | Age: 53
End: 2023-05-05
Payer: MEDICAID

## 2023-05-05 NOTE — TELEPHONE ENCOUNTER
Pt states she just want to know if her records transferred over and also need to see ENT so she can begin this process to relief , please advise

## 2023-05-08 ENCOUNTER — PATIENT MESSAGE (OUTPATIENT)
Dept: FAMILY MEDICINE | Facility: CLINIC | Age: 53
End: 2023-05-08
Payer: MEDICAID

## 2023-05-10 ENCOUNTER — PATIENT MESSAGE (OUTPATIENT)
Dept: FAMILY MEDICINE | Facility: CLINIC | Age: 53
End: 2023-05-10
Payer: MEDICAID

## 2023-05-10 NOTE — TELEPHONE ENCOUNTER
Sent Pt what she requested :department info ,address ,city and state,zip code ,phone number ,and fax number for her records to be sent over to us .   
Detail Level: Detailed
Quality 110: Preventive Care And Screening: Influenza Immunization: Influenza Immunization not Administered because Patient Refused.

## 2023-05-22 ENCOUNTER — PATIENT MESSAGE (OUTPATIENT)
Dept: FAMILY MEDICINE | Facility: CLINIC | Age: 53
End: 2023-05-22
Payer: COMMERCIAL

## 2023-06-12 ENCOUNTER — PATIENT MESSAGE (OUTPATIENT)
Dept: FAMILY MEDICINE | Facility: CLINIC | Age: 53
End: 2023-06-12
Payer: COMMERCIAL

## 2023-06-20 RX ORDER — DICLOFENAC SODIUM 75 MG/1
75 TABLET, DELAYED RELEASE ORAL 2 TIMES DAILY PRN
Qty: 60 TABLET | Refills: 2 | Status: SHIPPED | OUTPATIENT
Start: 2023-06-20 | End: 2023-10-08 | Stop reason: SDUPTHER

## 2023-06-30 ENCOUNTER — HOSPITAL ENCOUNTER (OUTPATIENT)
Dept: RADIOLOGY | Facility: HOSPITAL | Age: 53
Discharge: HOME | End: 2023-06-30
Attending: NURSE PRACTITIONER
Payer: COMMERCIAL

## 2023-06-30 ENCOUNTER — PATIENT MESSAGE (OUTPATIENT)
Dept: OTOLARYNGOLOGY | Facility: CLINIC | Age: 53
End: 2023-06-30
Payer: COMMERCIAL

## 2023-06-30 ENCOUNTER — OFFICE VISIT (OUTPATIENT)
Dept: FAMILY MEDICINE | Facility: CLINIC | Age: 53
End: 2023-06-30
Payer: COMMERCIAL

## 2023-06-30 VITALS
OXYGEN SATURATION: 98 % | DIASTOLIC BLOOD PRESSURE: 62 MMHG | SYSTOLIC BLOOD PRESSURE: 102 MMHG | WEIGHT: 202.63 LBS | BODY MASS INDEX: 38.26 KG/M2 | TEMPERATURE: 98 F | HEART RATE: 83 BPM | HEIGHT: 61 IN

## 2023-06-30 VITALS — HEIGHT: 61 IN | WEIGHT: 202 LBS | BODY MASS INDEX: 38.14 KG/M2

## 2023-06-30 DIAGNOSIS — S02.2XXG CLOSED FRACTURE OF NASAL BONE WITH DELAYED HEALING, SUBSEQUENT ENCOUNTER: Primary | ICD-10-CM

## 2023-06-30 DIAGNOSIS — J34.9 SINUS PROBLEM: ICD-10-CM

## 2023-06-30 DIAGNOSIS — Z91.89 AT HIGH RISK FOR BREAST CANCER: ICD-10-CM

## 2023-06-30 PROCEDURE — 77067 MAMMO DIGITAL SCREENING BILAT WITH TOMO: ICD-10-PCS | Mod: 26,,, | Performed by: RADIOLOGY

## 2023-06-30 PROCEDURE — 99215 PR OFFICE/OUTPT VISIT, EST, LEVL V, 40-54 MIN: ICD-10-PCS | Mod: S$GLB,,, | Performed by: FAMILY MEDICINE

## 2023-06-30 PROCEDURE — 3008F PR BODY MASS INDEX (BMI) DOCUMENTED: ICD-10-PCS | Mod: CPTII,S$GLB,, | Performed by: FAMILY MEDICINE

## 2023-06-30 PROCEDURE — 77063 BREAST TOMOSYNTHESIS BI: CPT | Mod: 26,,, | Performed by: RADIOLOGY

## 2023-06-30 PROCEDURE — 99999 PR PBB SHADOW E&M-EST. PATIENT-LVL V: CPT | Mod: PBBFAC,,, | Performed by: FAMILY MEDICINE

## 2023-06-30 PROCEDURE — 3078F DIAST BP <80 MM HG: CPT | Mod: CPTII,S$GLB,, | Performed by: FAMILY MEDICINE

## 2023-06-30 PROCEDURE — 3008F BODY MASS INDEX DOCD: CPT | Mod: CPTII,S$GLB,, | Performed by: FAMILY MEDICINE

## 2023-06-30 PROCEDURE — 3074F SYST BP LT 130 MM HG: CPT | Mod: CPTII,S$GLB,, | Performed by: FAMILY MEDICINE

## 2023-06-30 PROCEDURE — 99999 PR PBB SHADOW E&M-EST. PATIENT-LVL V: ICD-10-PCS | Mod: PBBFAC,,, | Performed by: FAMILY MEDICINE

## 2023-06-30 PROCEDURE — 1159F PR MEDICATION LIST DOCUMENTED IN MEDICAL RECORD: ICD-10-PCS | Mod: CPTII,S$GLB,, | Performed by: FAMILY MEDICINE

## 2023-06-30 PROCEDURE — 1160F RVW MEDS BY RX/DR IN RCRD: CPT | Mod: CPTII,S$GLB,, | Performed by: FAMILY MEDICINE

## 2023-06-30 PROCEDURE — 77063 MAMMO DIGITAL SCREENING BILAT WITH TOMO: ICD-10-PCS | Mod: 26,,, | Performed by: RADIOLOGY

## 2023-06-30 PROCEDURE — 77067 SCR MAMMO BI INCL CAD: CPT | Mod: 26,,, | Performed by: RADIOLOGY

## 2023-06-30 PROCEDURE — 1159F MED LIST DOCD IN RCRD: CPT | Mod: CPTII,S$GLB,, | Performed by: FAMILY MEDICINE

## 2023-06-30 PROCEDURE — 77067 SCR MAMMO BI INCL CAD: CPT | Mod: TC

## 2023-06-30 PROCEDURE — 99215 OFFICE O/P EST HI 40 MIN: CPT | Mod: S$GLB,,, | Performed by: FAMILY MEDICINE

## 2023-06-30 PROCEDURE — 3074F PR MOST RECENT SYSTOLIC BLOOD PRESSURE < 130 MM HG: ICD-10-PCS | Mod: CPTII,S$GLB,, | Performed by: FAMILY MEDICINE

## 2023-06-30 PROCEDURE — 1160F PR REVIEW ALL MEDS BY PRESCRIBER/CLIN PHARMACIST DOCUMENTED: ICD-10-PCS | Mod: CPTII,S$GLB,, | Performed by: FAMILY MEDICINE

## 2023-06-30 PROCEDURE — 3078F PR MOST RECENT DIASTOLIC BLOOD PRESSURE < 80 MM HG: ICD-10-PCS | Mod: CPTII,S$GLB,, | Performed by: FAMILY MEDICINE

## 2023-06-30 RX ORDER — DIAZEPAM 10 MG/1
5 TABLET ORAL
COMMUNITY

## 2023-06-30 RX ORDER — OMEPRAZOLE 10 MG/1
CAPSULE, DELAYED RELEASE ORAL
COMMUNITY
End: 2023-10-04

## 2023-06-30 NOTE — PROGRESS NOTES
Assessment & Plan  Problem List Items Addressed This Visit    None  Visit Diagnoses       Closed fracture of nasal bone with delayed healing, subsequent encounter    -  Primary    Relevant Orders    Ambulatory referral/consult to ENT    Sinus problem        Relevant Orders    Ambulatory referral/consult to ENT        Need to meet with ENT    Complete  6 week antibiotic if delayed intervention with [facility]   Continue with tylenol for bursitis in shoulder   Continue diclofenac for continued pain of the legs       Health Maintenance reviewed.    Follow-up: No follow-ups on file.    ______________________________________________________________________    Chief Complaint  Chief Complaint   Patient presents with    Neck Pain       HPI  Cheryl Espinosa is a 52 y.o. female with multiple medical diagnoses as listed in the medical history and problem list that presents for neck pain.  Pt is known to me with last appointment 9/7/2022.    She reports increased pressure on the left side of her face with pressure that will extend down to her chest.  She was evaluated by ent with a plan to remove polyps and correct a deviated septum, but unable to complete the surgery.  She describes a bells palsy like symptoms.  She does have twitches on the left side of her face.  She has to have a route canal on both sides of her mouth.  Pressure can be severe especially during times of high stress.  She does not describe a pain, but tylenol arthritis does provide relief from the pressure.  She did have a previous issue with nose bleeds that improved with antihistamine and nasal sprays.  She describes irritation and itching toward the back of the throat.        PAST MEDICAL HISTORY:  Past Medical History:   Diagnosis Date    Abnormal Pap smear of cervix     Carpal tunnel syndrome     GERD (gastroesophageal reflux disease)     History of degenerative disc disease     Metatarsalgia        PAST SURGICAL HISTORY:  Past Surgical  History:   Procedure Laterality Date    COLONOSCOPY N/A 11/30/2022    Procedure: COLONOSCOPY;  Surgeon: Jennifer Dupont MD;  Location: [facility];  Service: Endoscopy;  Laterality: N/A;    COLPOSCOPY         SOCIAL HISTORY:  Social History     Socioeconomic History    Marital status: Single   Tobacco Use    Smoking status: Never    Smokeless tobacco: Never   Substance and Sexual Activity    Alcohol use: Yes    Drug use: No    Sexual activity: Not Currently     Social Determinants of Health     Financial Resource Strain: Low Risk     Difficulty of Paying Living Expenses: Not hard at all   Food Insecurity: No Food Insecurity    Worried About Running Out of Food in the Last Year: Never true    Ran Out of Food in the Last Year: Never true   Transportation Needs: No Transportation Needs    Lack of Transportation (Medical): No    Lack of Transportation (Non-Medical): No   Physical Activity: Insufficiently Active    Days of Exercise per Week: 1 day    Minutes of Exercise per Session: 30 min   Stress: Stress Concern Present    Feeling of Stress : To some extent   Social Connections: Unknown    Frequency of Communication with Friends and Family: More than three times a week    Frequency of Social Gatherings with Friends and Family: Once a week    Active Member of Clubs or Organizations: No    Attends Club or Organization Meetings: Never    Marital Status: Never    Housing Stability: Low Risk     Unable to Pay for Housing in the Last Year: No    Number of Places Lived in the Last Year: 1    Unstable Housing in the Last Year: No       FAMILY HISTORY:  Family History   Problem Relation Age of Onset    Breast cancer Maternal Aunt     Breast cancer Maternal Grandmother     Ovarian cancer Neg Hx     Colon cancer Neg Hx     Cancer Neg Hx     Esophageal cancer Neg Hx        ALLERGIES AND MEDICATIONS: updated and reviewed.  Review of patient's allergies indicates:   Allergen Reactions    Doxycycline Anaphylaxis, Nausea  "And Vomiting and Other (See Comments)    Medrol [methylprednisolone] Shortness Of Breath and Rash    Nsaids (non-steroidal anti-inflammatory drug) Nausea And Vomiting     Nausea      Current Outpatient Medications   Medication Sig Dispense Refill    diazePAM (VALIUM) 10 MG Tab Valium      diclofenac (VOLTAREN) 75 MG EC tablet Take 1 tablet (75 mg total) by mouth 2 (two) times daily as needed (back pain). 60 tablet 2    DICLOFENAC-BENZALKONIUM CHLOR TOP Diclofenac      multivit with minerals/lutein (MULTIVITAMIN 50 PLUS ORAL) Multivitamin      omeprazole (PRILOSEC) 40 MG capsule Take 1 capsule (40 mg total) by mouth once daily. 30 capsule 11    omeprazole (PRILOSEC) 10 MG capsule Omeprazole       No current facility-administered medications for this visit.         ROS  Review of Systems   Constitutional:  Negative for activity change and unexpected weight change.   HENT:  Positive for rhinorrhea. Negative for hearing loss and trouble swallowing.    Eyes:  Negative for discharge and visual disturbance.   Respiratory:  Positive for chest tightness. Negative for wheezing.    Cardiovascular:  Positive for palpitations. Negative for chest pain.   Gastrointestinal:  Negative for blood in stool, constipation, diarrhea and vomiting.   Endocrine: Positive for polyuria. Negative for polydipsia.   Genitourinary:  Negative for difficulty urinating, dysuria, hematuria and menstrual problem.   Musculoskeletal:  Positive for arthralgias, joint swelling and neck pain.   Neurological:  Positive for weakness and headaches.   Psychiatric/Behavioral:  Positive for confusion and dysphoric mood.          Physical Exam  Vitals:    06/30/23 0715   BP: 102/62   BP Location: Right arm   Patient Position: Sitting   BP Method: Large (Manual)   Pulse: 83   Temp: 98 °F (36.7 °C)   TempSrc: Oral   SpO2: 98%   Weight: 91.9 kg (202 lb 9.6 oz)   Height: 5' 1" (1.549 m)    Body mass index is 38.28 kg/m².  Weight: 91.9 kg (202 lb 9.6 oz)   Height: 5' " "1" (154.9 cm)   Physical Exam  Vitals reviewed.   Constitutional:       Appearance: Normal appearance. She is well-developed.   HENT:      Head: Normocephalic and atraumatic.      Right Ear: External ear normal.      Left Ear: External ear normal.      Nose: Nose normal.      Mouth/Throat:      Mouth: Mucous membranes are moist.      Pharynx: Oropharynx is clear.   Eyes:      Extraocular Movements: Extraocular movements intact.      Conjunctiva/sclera: Conjunctivae normal.      Pupils: Pupils are equal, round, and reactive to light.   Cardiovascular:      Rate and Rhythm: Normal rate and regular rhythm.      Heart sounds: Normal heart sounds.   Pulmonary:      Effort: Pulmonary effort is normal.      Breath sounds: Normal breath sounds.   Skin:     General: Skin is warm and dry.   Neurological:      Mental Status: She is alert and oriented to person, place, and time.         Health Maintenance         Date Due Completion Date    Hepatitis C Screening Never done ---    COVID-19 Vaccine (4 - Moderna series) 06/02/2022 4/7/2022    Shingles Vaccine (1 of 2) Never done ---    Mammogram 06/29/2023 6/29/2022    Influenza Vaccine (Season Ended) 09/01/2023 ---    DEXA Scan 11/01/2024 11/1/2022    Hemoglobin A1c (Diabetic Prevention Screening) 07/02/2025 7/2/2022    Lipid Panel 07/02/2027 7/2/2022    Cervical Cancer Screening 09/21/2027 9/21/2022    TETANUS VACCINE 11/18/2029 11/18/2019    Colorectal Cancer Screening 11/30/2032 11/30/2022                Patient note was created using [employer].  Any errors in syntax or even information may not have been identified and edited on initial review prior to signing this note.  "

## 2023-07-03 ENCOUNTER — PATIENT MESSAGE (OUTPATIENT)
Dept: FAMILY MEDICINE | Facility: CLINIC | Age: 53
End: 2023-07-03
Payer: COMMERCIAL

## 2023-07-03 ENCOUNTER — PATIENT MESSAGE (OUTPATIENT)
Dept: OTOLARYNGOLOGY | Facility: CLINIC | Age: 53
End: 2023-07-03
Payer: COMMERCIAL

## 2023-07-06 ENCOUNTER — PATIENT MESSAGE (OUTPATIENT)
Dept: FAMILY MEDICINE | Facility: CLINIC | Age: 53
End: 2023-07-06
Payer: COMMERCIAL

## 2023-07-10 ENCOUNTER — PATIENT MESSAGE (OUTPATIENT)
Dept: OTOLARYNGOLOGY | Facility: CLINIC | Age: 53
End: 2023-07-10
Payer: COMMERCIAL

## 2023-07-11 ENCOUNTER — PATIENT MESSAGE (OUTPATIENT)
Dept: FAMILY MEDICINE | Facility: CLINIC | Age: 53
End: 2023-07-11
Payer: COMMERCIAL

## 2023-07-11 ENCOUNTER — OFFICE VISIT (OUTPATIENT)
Dept: OTOLARYNGOLOGY | Facility: CLINIC | Age: 53
End: 2023-07-11
Payer: COMMERCIAL

## 2023-07-11 VITALS
HEART RATE: 71 BPM | SYSTOLIC BLOOD PRESSURE: 118 MMHG | RESPIRATION RATE: 18 BRPM | BODY MASS INDEX: 38.71 KG/M2 | DIASTOLIC BLOOD PRESSURE: 72 MMHG | WEIGHT: 205 LBS | OXYGEN SATURATION: 100 % | HEIGHT: 61 IN

## 2023-07-11 DIAGNOSIS — J30.9 ALLERGIC RHINITIS, UNSPECIFIED SEASONALITY, UNSPECIFIED TRIGGER: ICD-10-CM

## 2023-07-11 DIAGNOSIS — J32.9 CHRONIC RECURRENT SINUSITIS: Primary | ICD-10-CM

## 2023-07-11 PROCEDURE — 3008F PR BODY MASS INDEX (BMI) DOCUMENTED: ICD-10-PCS | Mod: CPTII,S$GLB,, | Performed by: OTOLARYNGOLOGY

## 2023-07-11 PROCEDURE — 99203 PR OFFICE/OUTPT VISIT, NEW, LEVL III, 30-44 MIN: ICD-10-PCS | Mod: S$GLB,,, | Performed by: OTOLARYNGOLOGY

## 2023-07-11 PROCEDURE — 3074F SYST BP LT 130 MM HG: CPT | Mod: CPTII,S$GLB,, | Performed by: OTOLARYNGOLOGY

## 2023-07-11 PROCEDURE — 3078F PR MOST RECENT DIASTOLIC BLOOD PRESSURE < 80 MM HG: ICD-10-PCS | Mod: CPTII,S$GLB,, | Performed by: OTOLARYNGOLOGY

## 2023-07-11 PROCEDURE — 1160F PR REVIEW ALL MEDS BY PRESCRIBER/CLIN PHARMACIST DOCUMENTED: ICD-10-PCS | Mod: CPTII,S$GLB,, | Performed by: OTOLARYNGOLOGY

## 2023-07-11 PROCEDURE — 3008F BODY MASS INDEX DOCD: CPT | Mod: CPTII,S$GLB,, | Performed by: OTOLARYNGOLOGY

## 2023-07-11 PROCEDURE — 1160F RVW MEDS BY RX/DR IN RCRD: CPT | Mod: CPTII,S$GLB,, | Performed by: OTOLARYNGOLOGY

## 2023-07-11 PROCEDURE — 1159F MED LIST DOCD IN RCRD: CPT | Mod: CPTII,S$GLB,, | Performed by: OTOLARYNGOLOGY

## 2023-07-11 PROCEDURE — 3078F DIAST BP <80 MM HG: CPT | Mod: CPTII,S$GLB,, | Performed by: OTOLARYNGOLOGY

## 2023-07-11 PROCEDURE — 3074F PR MOST RECENT SYSTOLIC BLOOD PRESSURE < 130 MM HG: ICD-10-PCS | Mod: CPTII,S$GLB,, | Performed by: OTOLARYNGOLOGY

## 2023-07-11 PROCEDURE — 99203 OFFICE O/P NEW LOW 30 MIN: CPT | Mod: S$GLB,,, | Performed by: OTOLARYNGOLOGY

## 2023-07-11 PROCEDURE — 1159F PR MEDICATION LIST DOCUMENTED IN MEDICAL RECORD: ICD-10-PCS | Mod: CPTII,S$GLB,, | Performed by: OTOLARYNGOLOGY

## 2023-07-11 NOTE — PROGRESS NOTES
Ms. Espinosa     Vitals:    23 0854   BP: 118/72   Pulse: 71   Resp: 18       Chief Complaint:  Recurrent sinusitis and nasal obstruction     HPI:   is a 52-year-old white female who presents referred by Dr. Luevano for chronic recurrent sinusitis, nasal obstruction and allergic rhinitis.  She was previously being seen and treated by Dr. Donnelly and was scheduled for surgery however they told her that they had some insurance issues though she feels that there was no issue.  Her symptoms include sinus pressure pain with history of recurrent infections treated with antibiotics in the past as well as history of nosebleeds becoming progressively worse since 2015.    SNOT22- 63 NOSE- 75    Review of Systems:  Constitutional:   weight loss or weight gain: Negative  Allergy/Immunologic:   Positive  Nasal Congestion/Obstruction:   Positive  Nosebleeds:   Positive  Sinus infections:   Positive  Headache/Facial Pain:   Positive  Snoring/DANIEL:   Positive for snoring  Throat: Infections/Pain:   Negative  Hoarseness/Speech Disturbance:   Negative  Trauma Hx:  Negative    Cardiovascular:  M/I Angina: Negative  Hypertension: Negative  Endocrine:    DM/Steroids: Negative  GI:   Dysphagia/Reflux:  Positive  :   GYN Pregnancy: Negative  Renal:   Dialysis: Negative  Lymphatic:   Neck Mass/Lymphadenopathy: Negative  Muscoloskeletal:   Negative  Hematologic:   Bleeding Disorders/Anemia: Negative  Neurologic:    Cranial/Neuralgia: Negative  Pulmonary:   Asthma/SOB/Cough: Negative  Skin Disorders: Negative    Past Medical/Surgical/Family/Social History:    ENT Surgery: Negative  Occupational Exposure: Negative   Problems: Negative  Cancer: Negative    Past Family History:   Family history of Cancer: Negative    Past Social History:   Tobacco: Nonsmoker   Alcohol:  Occasional Social Drinker      Allergies and medications: Reviewed per med card.    Physical Examination:  Ears:   External auditory canals:   Clear   Hearing: Grossly intact   Tympanic Membranes: Clear  Nose:   External:  Upper bony dorsal irregularity palpable.  She states she had a nasal fracture when she was 17 with no treatment.  Some external valve collapse on deeper inspiration, positive Whitman maneuver.   Intranasal:  Septal deviation to the left with 2+ turbinates.  This creates 75% obstruction.  Mouth:   Intraorally: Lips, teeth, and gums: Normal   Oropharynx: Normal   Mucosa: Normal   Tongue: Normal  Throat:      Palate: Normal palate with elevation, Mallampati 1   Tonsils:  Minimal bilaterally   Posterior Pharynx: Normal  Fiberoptic exam: Not performed  Head/Face:     Inspection: Normal and atraumatic   Palpation/Percussion:  Somewhat tender to percussion in the maxillary and ethmoid regions bilaterally.   Facial strength: Normal and symmetric   Salivary glands: Normal  Neck: Supple  Thyroid: No masses  Lymphatics: No nodes  Respiratory:   Effort: Normal  Eyes:   Ocular Mobility: Normal   Vision: Grossly intact  Neuro/Psych:   Cranial Nerves: Grossly Intact   Orientation: Normal   Mood/Affect: Normal      Assessment/Plan:  Have discussed my findings with her in detail as well as my recommendations for treatment.  I have given her literature on saline sinus rinses including issues with distilled water only described this to be used in detail with her.  She states she is tried numerous nasal sprays and I have suggested that she trial Nasacort and I have described how this to be administered as well.  She is in the process of obtaining the CT scan disc for me from her previous providers.  She will get this to me and we will review and discuss plans for follow-up.

## 2023-07-12 ENCOUNTER — TELEPHONE (OUTPATIENT)
Dept: FAMILY MEDICINE | Facility: CLINIC | Age: 53
End: 2023-07-12
Payer: COMMERCIAL

## 2023-07-21 ENCOUNTER — PATIENT MESSAGE (OUTPATIENT)
Dept: OTOLARYNGOLOGY | Facility: CLINIC | Age: 53
End: 2023-07-21
Payer: COMMERCIAL

## 2023-08-04 DIAGNOSIS — K21.9 GASTROESOPHAGEAL REFLUX DISEASE, UNSPECIFIED WHETHER ESOPHAGITIS PRESENT: ICD-10-CM

## 2023-08-04 RX ORDER — OMEPRAZOLE 40 MG/1
40 CAPSULE, DELAYED RELEASE ORAL DAILY
Qty: 30 CAPSULE | Refills: 11 | Status: SHIPPED | OUTPATIENT
Start: 2023-08-04 | End: 2024-08-03

## 2023-08-08 ENCOUNTER — TELEPHONE (OUTPATIENT)
Dept: OTOLARYNGOLOGY | Facility: CLINIC | Age: 53
End: 2023-08-08
Payer: MEDICAID

## 2023-08-14 ENCOUNTER — PATIENT MESSAGE (OUTPATIENT)
Dept: FAMILY MEDICINE | Facility: CLINIC | Age: 53
End: 2023-08-14
Payer: MEDICAID

## 2023-08-16 ENCOUNTER — TELEPHONE (OUTPATIENT)
Dept: OTOLARYNGOLOGY | Facility: CLINIC | Age: 53
End: 2023-08-16
Payer: MEDICAID

## 2023-09-10 ENCOUNTER — PATIENT MESSAGE (OUTPATIENT)
Dept: OTOLARYNGOLOGY | Facility: CLINIC | Age: 53
End: 2023-09-10
Payer: COMMERCIAL

## 2023-09-19 ENCOUNTER — OFFICE VISIT (OUTPATIENT)
Dept: OTOLARYNGOLOGY | Facility: CLINIC | Age: 53
End: 2023-09-19
Payer: COMMERCIAL

## 2023-09-19 ENCOUNTER — PATIENT MESSAGE (OUTPATIENT)
Dept: FAMILY MEDICINE | Facility: CLINIC | Age: 53
End: 2023-09-19
Payer: COMMERCIAL

## 2023-09-19 VITALS
WEIGHT: 207 LBS | SYSTOLIC BLOOD PRESSURE: 118 MMHG | BODY MASS INDEX: 39.08 KG/M2 | DIASTOLIC BLOOD PRESSURE: 70 MMHG | HEIGHT: 61 IN | HEART RATE: 102 BPM

## 2023-09-19 DIAGNOSIS — J32.9 CHRONIC RECURRENT SINUSITIS: ICD-10-CM

## 2023-09-19 DIAGNOSIS — J34.2 NASAL SEPTAL DEVIATION: Primary | ICD-10-CM

## 2023-09-19 DIAGNOSIS — J34.3 NASAL TURBINATE HYPERTROPHY: ICD-10-CM

## 2023-09-19 DIAGNOSIS — J34.89 NASAL OBSTRUCTION: ICD-10-CM

## 2023-09-19 PROCEDURE — 3008F PR BODY MASS INDEX (BMI) DOCUMENTED: ICD-10-PCS | Mod: CPTII,S$GLB,, | Performed by: OTOLARYNGOLOGY

## 2023-09-19 PROCEDURE — 99213 PR OFFICE/OUTPT VISIT, EST, LEVL III, 20-29 MIN: ICD-10-PCS | Mod: S$GLB,,, | Performed by: OTOLARYNGOLOGY

## 2023-09-19 PROCEDURE — 3078F DIAST BP <80 MM HG: CPT | Mod: CPTII,S$GLB,, | Performed by: OTOLARYNGOLOGY

## 2023-09-19 PROCEDURE — 1160F RVW MEDS BY RX/DR IN RCRD: CPT | Mod: CPTII,S$GLB,, | Performed by: OTOLARYNGOLOGY

## 2023-09-19 PROCEDURE — 1160F PR REVIEW ALL MEDS BY PRESCRIBER/CLIN PHARMACIST DOCUMENTED: ICD-10-PCS | Mod: CPTII,S$GLB,, | Performed by: OTOLARYNGOLOGY

## 2023-09-19 PROCEDURE — 1159F MED LIST DOCD IN RCRD: CPT | Mod: CPTII,S$GLB,, | Performed by: OTOLARYNGOLOGY

## 2023-09-19 PROCEDURE — 99213 OFFICE O/P EST LOW 20 MIN: CPT | Mod: S$GLB,,, | Performed by: OTOLARYNGOLOGY

## 2023-09-19 PROCEDURE — 3074F PR MOST RECENT SYSTOLIC BLOOD PRESSURE < 130 MM HG: ICD-10-PCS | Mod: CPTII,S$GLB,, | Performed by: OTOLARYNGOLOGY

## 2023-09-19 PROCEDURE — 1159F PR MEDICATION LIST DOCUMENTED IN MEDICAL RECORD: ICD-10-PCS | Mod: CPTII,S$GLB,, | Performed by: OTOLARYNGOLOGY

## 2023-09-19 PROCEDURE — 3078F PR MOST RECENT DIASTOLIC BLOOD PRESSURE < 80 MM HG: ICD-10-PCS | Mod: CPTII,S$GLB,, | Performed by: OTOLARYNGOLOGY

## 2023-09-19 PROCEDURE — 3074F SYST BP LT 130 MM HG: CPT | Mod: CPTII,S$GLB,, | Performed by: OTOLARYNGOLOGY

## 2023-09-19 PROCEDURE — 3008F BODY MASS INDEX DOCD: CPT | Mod: CPTII,S$GLB,, | Performed by: OTOLARYNGOLOGY

## 2023-09-19 NOTE — PROGRESS NOTES
The patient presents to review CT scans. These show evidence of chronic recurrent sinus infections involving her left maxillary sinus.  It also confirms left laura bullosa and marked nasal septal deformity with hypertrophic turbinates.  We have discussed [employer] FESS addressing her left maxillary sinus, septoplasty, submucous resection of turbinates and excision of left laura bullosa to address these issues. I have disscussed the pros and cons, risks, benefits, alternatives, as well as the technical aspects of the planned procedure in detail. The patient voices understanding and wishes to proceed with scheduling.

## 2023-09-22 ENCOUNTER — TELEPHONE (OUTPATIENT)
Dept: FAMILY MEDICINE | Facility: CLINIC | Age: 53
End: 2023-09-22
Payer: COMMERCIAL

## 2023-09-22 ENCOUNTER — TELEPHONE (OUTPATIENT)
Dept: OTOLARYNGOLOGY | Facility: CLINIC | Age: 53
End: 2023-09-22
Payer: COMMERCIAL

## 2023-09-22 DIAGNOSIS — J34.2 NASAL SEPTAL DEVIATION: ICD-10-CM

## 2023-09-22 DIAGNOSIS — J34.3 NASAL TURBINATE HYPERTROPHY: ICD-10-CM

## 2023-09-22 DIAGNOSIS — S02.2XXG CLOSED FRACTURE OF NASAL BONE WITH DELAYED HEALING, SUBSEQUENT ENCOUNTER: Primary | ICD-10-CM

## 2023-09-22 NOTE — TELEPHONE ENCOUNTER
Patient was contacted to set up scheduling , she was asked what month works for her she stated end of October she was informed there were not openings at the end of October because the slots are filled she stated she will not be able to have the surgery due to her insurance and she will be making a complaint because Dr Bone took long to get the process going I was receptive to her grievance and wished her well and the call concluded.

## 2023-10-04 ENCOUNTER — LAB VISIT (OUTPATIENT)
Dept: LAB | Facility: HOSPITAL | Age: 53
End: 2023-10-04
Attending: FAMILY MEDICINE
Payer: COMMERCIAL

## 2023-10-04 ENCOUNTER — OFFICE VISIT (OUTPATIENT)
Dept: FAMILY MEDICINE | Facility: CLINIC | Age: 53
End: 2023-10-04
Payer: COMMERCIAL

## 2023-10-04 VITALS
SYSTOLIC BLOOD PRESSURE: 110 MMHG | WEIGHT: 204.13 LBS | DIASTOLIC BLOOD PRESSURE: 80 MMHG | HEART RATE: 80 BPM | BODY MASS INDEX: 38.54 KG/M2 | HEIGHT: 61 IN | TEMPERATURE: 99 F | OXYGEN SATURATION: 97 %

## 2023-10-04 DIAGNOSIS — Z00.00 ANNUAL PHYSICAL EXAM: ICD-10-CM

## 2023-10-04 DIAGNOSIS — Z00.00 ANNUAL PHYSICAL EXAM: Primary | ICD-10-CM

## 2023-10-04 LAB
ALBUMIN SERPL BCP-MCNC: 4.2 G/DL (ref 3.5–5.2)
ALP SERPL-CCNC: 89 U/L (ref 55–135)
ALT SERPL W/O P-5'-P-CCNC: 27 U/L (ref 10–44)
ANION GAP SERPL CALC-SCNC: 6 MMOL/L (ref 8–16)
AST SERPL-CCNC: 20 U/L (ref 10–40)
BASOPHILS # BLD AUTO: 0.04 K/UL (ref 0–0.2)
BASOPHILS NFR BLD: 0.7 % (ref 0–1.9)
BILIRUB SERPL-MCNC: 0.6 MG/DL (ref 0.1–1)
BUN SERPL-MCNC: 11 MG/DL (ref 6–20)
CALCIUM SERPL-MCNC: 9.6 MG/DL (ref 8.7–10.5)
CHLORIDE SERPL-SCNC: 107 MMOL/L (ref 95–110)
CHOLEST SERPL-MCNC: 249 MG/DL (ref 120–199)
CHOLEST/HDLC SERPL: 5.4 {RATIO} (ref 2–5)
CO2 SERPL-SCNC: 28 MMOL/L (ref 23–29)
CREAT SERPL-MCNC: 0.9 MG/DL (ref 0.5–1.4)
DIFFERENTIAL METHOD: ABNORMAL
EOSINOPHIL # BLD AUTO: 0.1 K/UL (ref 0–0.5)
EOSINOPHIL NFR BLD: 1.8 % (ref 0–8)
ERYTHROCYTE [DISTWIDTH] IN BLOOD BY AUTOMATED COUNT: 13.5 % (ref 11.5–14.5)
EST. GFR  (NO RACE VARIABLE): >60 ML/MIN/1.73 M^2
ESTIMATED AVG GLUCOSE: 105 MG/DL (ref 68–131)
GLUCOSE SERPL-MCNC: 97 MG/DL (ref 70–110)
HBA1C MFR BLD: 5.3 % (ref 4–5.6)
HCT VFR BLD AUTO: 39.5 % (ref 37–48.5)
HDLC SERPL-MCNC: 46 MG/DL (ref 40–75)
HDLC SERPL: 18.5 % (ref 20–50)
HGB BLD-MCNC: 12.3 G/DL (ref 12–16)
IMM GRANULOCYTES # BLD AUTO: 0.01 K/UL (ref 0–0.04)
IMM GRANULOCYTES NFR BLD AUTO: 0.2 % (ref 0–0.5)
LDLC SERPL CALC-MCNC: 172.4 MG/DL (ref 63–159)
LYMPHOCYTES # BLD AUTO: 1.4 K/UL (ref 1–4.8)
LYMPHOCYTES NFR BLD: 22.8 % (ref 18–48)
MCH RBC QN AUTO: 26.2 PG (ref 27–31)
MCHC RBC AUTO-ENTMCNC: 31.1 G/DL (ref 32–36)
MCV RBC AUTO: 84 FL (ref 82–98)
MONOCYTES # BLD AUTO: 0.4 K/UL (ref 0.3–1)
MONOCYTES NFR BLD: 6.7 % (ref 4–15)
NEUTROPHILS # BLD AUTO: 4.1 K/UL (ref 1.8–7.7)
NEUTROPHILS NFR BLD: 67.8 % (ref 38–73)
NONHDLC SERPL-MCNC: 203 MG/DL
NRBC BLD-RTO: 0 /100 WBC
PLATELET # BLD AUTO: 316 K/UL (ref 150–450)
PMV BLD AUTO: 10.5 FL (ref 9.2–12.9)
POTASSIUM SERPL-SCNC: 4.6 MMOL/L (ref 3.5–5.1)
PROT SERPL-MCNC: 7.3 G/DL (ref 6–8.4)
RBC # BLD AUTO: 4.69 M/UL (ref 4–5.4)
SODIUM SERPL-SCNC: 141 MMOL/L (ref 136–145)
TRIGL SERPL-MCNC: 153 MG/DL (ref 30–150)
TSH SERPL DL<=0.005 MIU/L-ACNC: 2.26 UIU/ML (ref 0.4–4)
WBC # BLD AUTO: 6.01 K/UL (ref 3.9–12.7)

## 2023-10-04 PROCEDURE — 99999 PR PBB SHADOW E&M-EST. PATIENT-LVL IV: CPT | Mod: PBBFAC,,, | Performed by: FAMILY MEDICINE

## 2023-10-04 PROCEDURE — 80061 LIPID PANEL: CPT | Performed by: FAMILY MEDICINE

## 2023-10-04 PROCEDURE — 85025 COMPLETE CBC W/AUTO DIFF WBC: CPT | Performed by: FAMILY MEDICINE

## 2023-10-04 PROCEDURE — 80053 COMPREHEN METABOLIC PANEL: CPT | Performed by: FAMILY MEDICINE

## 2023-10-04 PROCEDURE — 3044F PR MOST RECENT HEMOGLOBIN A1C LEVEL <7.0%: ICD-10-PCS | Mod: CPTII,S$GLB,, | Performed by: FAMILY MEDICINE

## 2023-10-04 PROCEDURE — 3074F PR MOST RECENT SYSTOLIC BLOOD PRESSURE < 130 MM HG: ICD-10-PCS | Mod: CPTII,S$GLB,, | Performed by: FAMILY MEDICINE

## 2023-10-04 PROCEDURE — 1159F MED LIST DOCD IN RCRD: CPT | Mod: CPTII,S$GLB,, | Performed by: FAMILY MEDICINE

## 2023-10-04 PROCEDURE — 3044F HG A1C LEVEL LT 7.0%: CPT | Mod: CPTII,S$GLB,, | Performed by: FAMILY MEDICINE

## 2023-10-04 PROCEDURE — 3079F DIAST BP 80-89 MM HG: CPT | Mod: CPTII,S$GLB,, | Performed by: FAMILY MEDICINE

## 2023-10-04 PROCEDURE — 3079F PR MOST RECENT DIASTOLIC BLOOD PRESSURE 80-89 MM HG: ICD-10-PCS | Mod: CPTII,S$GLB,, | Performed by: FAMILY MEDICINE

## 2023-10-04 PROCEDURE — 83036 HEMOGLOBIN GLYCOSYLATED A1C: CPT | Performed by: FAMILY MEDICINE

## 2023-10-04 PROCEDURE — 36415 COLL VENOUS BLD VENIPUNCTURE: CPT | Mod: PO | Performed by: FAMILY MEDICINE

## 2023-10-04 PROCEDURE — 1159F PR MEDICATION LIST DOCUMENTED IN MEDICAL RECORD: ICD-10-PCS | Mod: CPTII,S$GLB,, | Performed by: FAMILY MEDICINE

## 2023-10-04 PROCEDURE — 99999 PR PBB SHADOW E&M-EST. PATIENT-LVL IV: ICD-10-PCS | Mod: PBBFAC,,, | Performed by: FAMILY MEDICINE

## 2023-10-04 PROCEDURE — 1160F PR REVIEW ALL MEDS BY PRESCRIBER/CLIN PHARMACIST DOCUMENTED: ICD-10-PCS | Mod: CPTII,S$GLB,, | Performed by: FAMILY MEDICINE

## 2023-10-04 PROCEDURE — 3008F PR BODY MASS INDEX (BMI) DOCUMENTED: ICD-10-PCS | Mod: CPTII,S$GLB,, | Performed by: FAMILY MEDICINE

## 2023-10-04 PROCEDURE — 3074F SYST BP LT 130 MM HG: CPT | Mod: CPTII,S$GLB,, | Performed by: FAMILY MEDICINE

## 2023-10-04 PROCEDURE — 99396 PREV VISIT EST AGE 40-64: CPT | Mod: S$GLB,,, | Performed by: FAMILY MEDICINE

## 2023-10-04 PROCEDURE — 99396 PR PREVENTIVE VISIT,EST,40-64: ICD-10-PCS | Mod: S$GLB,,, | Performed by: FAMILY MEDICINE

## 2023-10-04 PROCEDURE — 3008F BODY MASS INDEX DOCD: CPT | Mod: CPTII,S$GLB,, | Performed by: FAMILY MEDICINE

## 2023-10-04 PROCEDURE — 1160F RVW MEDS BY RX/DR IN RCRD: CPT | Mod: CPTII,S$GLB,, | Performed by: FAMILY MEDICINE

## 2023-10-04 PROCEDURE — 84443 ASSAY THYROID STIM HORMONE: CPT | Performed by: FAMILY MEDICINE

## 2023-10-04 NOTE — PROGRESS NOTES
Assessment & Plan  Problem List Items Addressed This Visit    None  Visit Diagnoses       Annual physical exam    -  Primary    Relevant Orders    CBC Auto Differential    Comprehensive Metabolic Panel    Hemoglobin A1C    Lipid Panel    TSH        I addressed all major concerns as it related to health maintenance.  All were ordered and scheduled based on the patients wishes.  Any additional health maintenance will be readdressed at the next physical if declined or deferred by the patient.        Health Maintenance reviewed, .    Follow-up: No follow-ups on file.    ______________________________________________________________________    Chief Complaint  Chief Complaint   Patient presents with    Annual Exam       HPI  Cheryl Espinosa is a 52 y.o. female with multiple medical diagnoses as listed in the medical history and problem list that presents for annual exam.  Pt is known to me with last appointment 6/30/2023.  Patient denies any new symptoms including chest pain, SOB, blurry vision, N/V, diarrhea.        PAST MEDICAL HISTORY:  Past Medical History:   Diagnosis Date    Abnormal Pap smear of cervix     Carpal tunnel syndrome     GERD (gastroesophageal reflux disease)     History of degenerative disc disease     Metatarsalgia        PAST SURGICAL HISTORY:  Past Surgical History:   Procedure Laterality Date    COLONOSCOPY N/A 11/30/2022    Procedure: COLONOSCOPY;  Surgeon: Jennifer Dupont MD;  Location: [facility];  Service: Endoscopy;  Laterality: N/A;    COLPOSCOPY         SOCIAL HISTORY:  Social History     Socioeconomic History    Marital status: Single   Tobacco Use    Smoking status: Never    Smokeless tobacco: Never   Substance and Sexual Activity    Alcohol use: Yes    Drug use: No    Sexual activity: Not Currently     Social Determinants of Health     Financial Resource Strain: Low Risk  (10/4/2023)    Overall Financial Resource Strain (CARDIA)     Difficulty of Paying  Living Expenses: Not very hard   Food Insecurity: No Food Insecurity (10/4/2023)    Hunger Vital Sign     Worried About Running Out of Food in the Last Year: Never true     Ran Out of Food in the Last Year: Never true   Transportation Needs: No Transportation Needs (10/4/2023)    PRAPARE - Transportation     Lack of Transportation (Medical): No     Lack of Transportation (Non-Medical): No   Physical Activity: Insufficiently Active (10/4/2023)    Exercise Vital Sign     Days of Exercise per Week: 1 day     Minutes of Exercise per Session: 30 min   Stress: Stress Concern Present (10/4/2023)    [employer] - Occupational Stress Questionnaire     Feeling of Stress : Rather much   Social Connections: Unknown (10/4/2023)    Social Connection and Isolation Panel [NHANES]     Frequency of Communication with Friends and Family: More than three times a week     Frequency of Social Gatherings with Friends and Family: Once a week     Active Member of Clubs or Organizations: No     Attends Club or Organization Meetings: Never     Marital Status: Never    Housing Stability: Low Risk  (10/4/2023)    Housing Stability Vital Sign     Unable to Pay for Housing in the Last Year: No     Number of Places Lived in the Last Year: 1     Unstable Housing in the Last Year: No       FAMILY HISTORY:  Family History   Problem Relation Age of Onset    Breast cancer Maternal Aunt     Breast cancer Maternal Grandmother     Ovarian cancer Neg Hx     Colon cancer Neg Hx     Cancer Neg Hx     Esophageal cancer Neg Hx        ALLERGIES AND MEDICATIONS: updated and reviewed.  Review of patient's allergies indicates:   Allergen Reactions    Doxycycline Anaphylaxis, Nausea And Vomiting and Other (See Comments)    Medrol [methylprednisolone] Shortness Of Breath and Rash    Nsaids (non-steroidal anti-inflammatory drug) Nausea And Vomiting     Nausea      Current Outpatient Medications   Medication  "Sig Dispense Refill    diazePAM (VALIUM) 10 MG Tab 5 mg.      diclofenac (VOLTAREN) 75 MG EC tablet Take 1 tablet (75 mg total) by mouth 2 (two) times daily as needed (back pain). 60 tablet 2    omeprazole (PRILOSEC) 40 MG capsule Take 1 capsule (40 mg total) by mouth once daily. 30 capsule 11    DICLOFENAC-BENZALKONIUM CHLOR TOP Diclofenac      multivit with minerals/lutein (MULTIVITAMIN 50 PLUS ORAL) Multivitamin       No current facility-administered medications for this visit.         ROS  Review of Systems   Constitutional:  Positive for activity change. Negative for appetite change, fatigue, fever and unexpected weight change.   HENT:  Positive for rhinorrhea. Negative for ear discharge, ear pain, hearing loss, sore throat and trouble swallowing.    Eyes: Negative.  Negative for discharge and visual disturbance.   Respiratory:  Positive for chest tightness and wheezing. Negative for apnea, cough and shortness of breath.    Cardiovascular:  Positive for palpitations. Negative for chest pain and leg swelling.   Gastrointestinal:  Positive for constipation and diarrhea. Negative for abdominal distention, abdominal pain, blood in stool and vomiting.   Endocrine: Positive for polyuria. Negative for cold intolerance, heat intolerance and polydipsia.   Genitourinary:  Negative for decreased urine volume, difficulty urinating, dysuria, hematuria, menstrual problem and urgency.   Musculoskeletal:  Positive for arthralgias, joint swelling and neck pain.   Skin:  Negative for rash.   Neurological:  Positive for weakness and headaches. Negative for dizziness.   Hematological:  Does not bruise/bleed easily.   Psychiatric/Behavioral:  Positive for dysphoric mood. Negative for agitation, confusion, sleep disturbance and suicidal ideas.          Physical Exam  Vitals:    10/04/23 0711   BP: 110/80   Pulse: 80   Temp: 98.6 °F (37 °C)   TempSrc: Oral   SpO2: 97%   Weight: 92.6 kg (204 lb 2.3 oz)   Height: 5' 1" (1.549 m) " "   Body mass index is 38.57 kg/m².  Weight: 92.6 kg (204 lb 2.3 oz)   Height: 5' 1" (154.9 cm)   Physical Exam  Vitals reviewed.   Constitutional:       Appearance: Normal appearance. She is well-developed.   HENT:      Head: Normocephalic and atraumatic.      Right Ear: External ear normal.      Left Ear: External ear normal.      Nose: Nose normal.      Mouth/Throat:      Mouth: Mucous membranes are moist.      Pharynx: Oropharynx is clear.   Eyes:      Extraocular Movements: Extraocular movements intact.      Conjunctiva/sclera: Conjunctivae normal.      Pupils: Pupils are equal, round, and reactive to light.   Cardiovascular:      Rate and Rhythm: Normal rate and regular rhythm.      Heart sounds: Normal heart sounds.   Pulmonary:      Effort: Pulmonary effort is normal.      Breath sounds: Normal breath sounds.   Skin:     General: Skin is warm and dry.   Neurological:      Mental Status: She is alert and oriented to person, place, and time.         Health Maintenance         Date Due Completion Date    Hepatitis C Screening Never done ---    COVID-19 Vaccine (4 - Moderna series) 06/02/2022 4/7/2022    Shingles Vaccine (1 of 2) Never done ---    Influenza Vaccine (1) Never done ---    Mammogram 06/30/2024 6/30/2023    DEXA Scan 11/01/2024 11/1/2022    Hemoglobin A1c (Diabetic Prevention Screening) 07/02/2025 7/2/2022    Lipid Panel 07/02/2027 7/2/2022    Cervical Cancer Screening 09/21/2027 9/21/2022    TETANUS VACCINE 11/18/2029 11/18/2019    Colorectal Cancer Screening 11/30/2032 11/30/2022                Patient note was created using [employer].  Any errors in syntax or even information may not have been identified and edited on initial review prior to signing this note.  "

## 2023-10-09 RX ORDER — DICLOFENAC SODIUM 75 MG/1
75 TABLET, DELAYED RELEASE ORAL 2 TIMES DAILY PRN
Qty: 60 TABLET | Refills: 2 | Status: SHIPPED | OUTPATIENT
Start: 2023-10-09

## 2023-11-20 ENCOUNTER — PATIENT MESSAGE (OUTPATIENT)
Dept: FAMILY MEDICINE | Facility: CLINIC | Age: 53
End: 2023-11-20
Payer: COMMERCIAL

## 2023-12-22 ENCOUNTER — PATIENT MESSAGE (OUTPATIENT)
Dept: FAMILY MEDICINE | Facility: CLINIC | Age: 53
End: 2023-12-22
Payer: COMMERCIAL

## 2023-12-26 ENCOUNTER — PATIENT MESSAGE (OUTPATIENT)
Dept: FAMILY MEDICINE | Facility: CLINIC | Age: 53
End: 2023-12-26
Payer: COMMERCIAL

## 2024-02-29 ENCOUNTER — CLINICAL SUPPORT (OUTPATIENT)
Dept: OTHER | Facility: CLINIC | Age: 54
End: 2024-02-29
Payer: COMMERCIAL

## 2024-02-29 ENCOUNTER — PATIENT MESSAGE (OUTPATIENT)
Dept: FAMILY MEDICINE | Facility: CLINIC | Age: 54
End: 2024-02-29
Payer: COMMERCIAL

## 2024-02-29 DIAGNOSIS — Z00.8 ENCOUNTER FOR OTHER GENERAL EXAMINATION: ICD-10-CM

## 2024-02-29 PROCEDURE — 99401 PREV MED CNSL INDIV APPRX 15: CPT | Mod: S$GLB,,, | Performed by: INTERNAL MEDICINE

## 2024-02-29 PROCEDURE — 82947 ASSAY GLUCOSE BLOOD QUANT: CPT | Mod: QW,S$GLB,, | Performed by: INTERNAL MEDICINE

## 2024-02-29 PROCEDURE — 80061 LIPID PANEL: CPT | Mod: QW,S$GLB,, | Performed by: INTERNAL MEDICINE

## 2024-03-01 VITALS
HEIGHT: 60 IN | BODY MASS INDEX: 39.66 KG/M2 | WEIGHT: 202 LBS | DIASTOLIC BLOOD PRESSURE: 73 MMHG | SYSTOLIC BLOOD PRESSURE: 111 MMHG

## 2024-03-01 LAB
HDLC SERPL-MCNC: 33 MG/DL
POC CHOLESTEROL, LDL (DOCK): 149 MG/DL
POC CHOLESTEROL, TOTAL: 235 MG/DL
POC GLUCOSE, FASTING: 86 MG/DL (ref 60–110)
TRIGL SERPL-MCNC: 287 MG/DL

## 2024-03-21 ENCOUNTER — PATIENT MESSAGE (OUTPATIENT)
Dept: FAMILY MEDICINE | Facility: CLINIC | Age: 54
End: 2024-03-21
Payer: COMMERCIAL

## 2024-03-27 ENCOUNTER — PATIENT MESSAGE (OUTPATIENT)
Dept: FAMILY MEDICINE | Facility: CLINIC | Age: 54
End: 2024-03-27
Payer: COMMERCIAL

## 2024-03-27 DIAGNOSIS — J34.3 NASAL TURBINATE HYPERTROPHY: ICD-10-CM

## 2024-03-27 DIAGNOSIS — R29.898 WEAKNESS OF BOTH LOWER EXTREMITIES: ICD-10-CM

## 2024-03-27 DIAGNOSIS — S02.2XXG CLOSED FRACTURE OF NASAL BONE WITH DELAYED HEALING, SUBSEQUENT ENCOUNTER: Primary | ICD-10-CM

## 2024-03-28 ENCOUNTER — PATIENT MESSAGE (OUTPATIENT)
Dept: FAMILY MEDICINE | Facility: CLINIC | Age: 54
End: 2024-03-28
Payer: COMMERCIAL

## 2024-04-10 DIAGNOSIS — Z11.59 NEED FOR HEPATITIS C SCREENING TEST: ICD-10-CM

## 2024-04-16 ENCOUNTER — PATIENT MESSAGE (OUTPATIENT)
Dept: ORTHOPEDICS | Facility: CLINIC | Age: 54
End: 2024-04-16
Payer: COMMERCIAL

## 2024-04-17 ENCOUNTER — TELEPHONE (OUTPATIENT)
Dept: NEUROSURGERY | Facility: CLINIC | Age: 54
End: 2024-04-17
Payer: COMMERCIAL

## 2024-04-18 ENCOUNTER — TELEPHONE (OUTPATIENT)
Dept: NEUROSURGERY | Facility: CLINIC | Age: 54
End: 2024-04-18
Payer: COMMERCIAL

## 2024-04-18 NOTE — TELEPHONE ENCOUNTER
Pt sent an inbox message requesting a call back from the office. Called pt. No answer. Left vm with call back number

## 2024-04-26 ENCOUNTER — TELEPHONE (OUTPATIENT)
Dept: NEUROSURGERY | Facility: CLINIC | Age: 54
End: 2024-04-26
Payer: COMMERCIAL

## 2024-04-26 NOTE — TELEPHONE ENCOUNTER
Spoke with pt. She is wanting to get some clarity about which specialty she should be seeing, Neurosurgery or Neurology. I talked to the providers in our office and they will review her chart and decide by early next week whom she should be seeing. Pt verbalized understanding and was grateful for saving her some time and money.

## 2024-04-29 ENCOUNTER — PATIENT MESSAGE (OUTPATIENT)
Dept: NEUROSURGERY | Facility: CLINIC | Age: 54
End: 2024-04-29
Payer: COMMERCIAL

## 2024-04-29 ENCOUNTER — TELEPHONE (OUTPATIENT)
Dept: NEUROSURGERY | Facility: CLINIC | Age: 54
End: 2024-04-29
Payer: COMMERCIAL

## 2024-04-29 NOTE — TELEPHONE ENCOUNTER
----- Message from Jarrell Calero sent at 4/29/2024  8:38 AM CDT -----  Pt is being referred to Dr. Wyatt by Dr. Keith Oden. Can you please help her get scheduled? Thanks :)

## 2024-06-07 ENCOUNTER — PATIENT MESSAGE (OUTPATIENT)
Dept: NEUROSURGERY | Facility: CLINIC | Age: 54
End: 2024-06-07
Payer: COMMERCIAL

## 2024-06-07 ENCOUNTER — TELEPHONE (OUTPATIENT)
Dept: NEUROSURGERY | Facility: CLINIC | Age: 54
End: 2024-06-07
Payer: COMMERCIAL

## 2024-06-07 NOTE — TELEPHONE ENCOUNTER
----- Message from Noah Manuel sent at 6/7/2024  1:44 PM CDT -----  Regarding: Disc./ Scheduling  Contact: [phone]  Good afternoon the pt is requesting a callback regarding providing an outside disc. Please contact pt to further discuss when and where she can drop this off so she cn be scheduled to be seen for   S02.2XXG (ICD-10-CM) - Closed fracture of nasal bone with delayed healing, subsequent encounter  J34.3 (ICD-10-CM) - Nasal turbinate hypertrophy. R29.898 (ICD-10-CM) - Weakness of both lower extremities   Per daughter, she can not physically get patient to the car to get to an appointment. Patient's dementia makes her get scared very quickly. Daughter does not have anyone to help bring patient to the clinic. Please advise if daughter can schedule telehealth appointment instead of in office visit

## 2024-06-07 NOTE — TELEPHONE ENCOUNTER
Called and spoke with pt. Pt will contact [facility] and [facility] to power share imaging.

## 2024-06-11 ENCOUNTER — TELEPHONE (OUTPATIENT)
Dept: FAMILY MEDICINE | Facility: CLINIC | Age: 54
End: 2024-06-11
Payer: COMMERCIAL

## 2024-06-19 ENCOUNTER — PATIENT OUTREACH (OUTPATIENT)
Dept: ADMINISTRATIVE | Facility: HOSPITAL | Age: 54
End: 2024-06-19
Payer: COMMERCIAL

## 2024-06-19 DIAGNOSIS — Z12.31 ENCOUNTER FOR SCREENING MAMMOGRAM FOR MALIGNANT NEOPLASM OF BREAST: Primary | ICD-10-CM

## 2024-06-19 RX ORDER — ACETAZOLAMIDE 125 MG/1
1 TABLET ORAL 2 TIMES DAILY
COMMUNITY
Start: 2024-01-26

## 2024-06-19 RX ORDER — DICLOFENAC SODIUM 75 MG/1
TABLET, DELAYED RELEASE ORAL 2 TIMES DAILY
COMMUNITY
Start: 2023-09-12

## 2024-07-17 ENCOUNTER — OFFICE VISIT (OUTPATIENT)
Dept: OBSTETRICS AND GYNECOLOGY | Facility: CLINIC | Age: 54
End: 2024-07-17
Attending: OBSTETRICS & GYNECOLOGY
Payer: COMMERCIAL

## 2024-07-17 VITALS
WEIGHT: 211 LBS | DIASTOLIC BLOOD PRESSURE: 81 MMHG | HEIGHT: 61 IN | SYSTOLIC BLOOD PRESSURE: 127 MMHG | HEART RATE: 90 BPM | BODY MASS INDEX: 39.84 KG/M2

## 2024-07-17 DIAGNOSIS — K21.9 GASTROESOPHAGEAL REFLUX DISEASE, UNSPECIFIED WHETHER ESOPHAGITIS PRESENT: ICD-10-CM

## 2024-07-17 DIAGNOSIS — Z01.419 ENCOUNTER FOR GYNECOLOGICAL EXAMINATION WITHOUT ABNORMAL FINDING: Primary | ICD-10-CM

## 2024-07-17 DIAGNOSIS — Z91.89 AT HIGH RISK FOR BREAST CANCER: ICD-10-CM

## 2024-07-17 PROCEDURE — 1159F MED LIST DOCD IN RCRD: CPT | Mod: CPTII,S$GLB,, | Performed by: OBSTETRICS & GYNECOLOGY

## 2024-07-17 PROCEDURE — 99999 PR PBB SHADOW E&M-EST. PATIENT-LVL III: CPT | Mod: PBBFAC,,, | Performed by: OBSTETRICS & GYNECOLOGY

## 2024-07-17 PROCEDURE — 3079F DIAST BP 80-89 MM HG: CPT | Mod: CPTII,S$GLB,, | Performed by: OBSTETRICS & GYNECOLOGY

## 2024-07-17 PROCEDURE — 3074F SYST BP LT 130 MM HG: CPT | Mod: CPTII,S$GLB,, | Performed by: OBSTETRICS & GYNECOLOGY

## 2024-07-17 PROCEDURE — 99396 PREV VISIT EST AGE 40-64: CPT | Mod: S$GLB,,, | Performed by: OBSTETRICS & GYNECOLOGY

## 2024-07-17 PROCEDURE — 3008F BODY MASS INDEX DOCD: CPT | Mod: CPTII,S$GLB,, | Performed by: OBSTETRICS & GYNECOLOGY

## 2024-07-17 RX ORDER — OMEPRAZOLE 40 MG/1
40 CAPSULE, DELAYED RELEASE ORAL DAILY
Qty: 30 CAPSULE | Refills: 11 | Status: SHIPPED | OUTPATIENT
Start: 2024-07-17 | End: 2025-07-17

## 2024-07-20 NOTE — PROGRESS NOTES
SUBJECTIVE:   53 y.o. female   for annual routine  checkup. Patient's last menstrual period was 2019 (exact date)..  She has no unusual complaints.        Past Medical History:   Diagnosis Date    Abnormal Pap smear of cervix     Carpal tunnel syndrome     GERD (gastroesophageal reflux disease)     History of degenerative disc disease     Metatarsalgia      Past Surgical History:   Procedure Laterality Date    COLONOSCOPY N/A 2022    Procedure: COLONOSCOPY;  Surgeon: Jennifer Dupont MD;  Location: [facility];  Service: Endoscopy;  Laterality: N/A;    COLPOSCOPY       Social History     Socioeconomic History    Marital status: Single   Tobacco Use    Smoking status: Never    Smokeless tobacco: Never   Substance and Sexual Activity    Alcohol use: Yes    Drug use: No    Sexual activity: Not Currently     Social Determinants of Health     Financial Resource Strain: Low Risk  (10/17/2023)    [place] [facility] [employer] [facility]    Overall Financial Resource Strain (CARDIA)     Difficulty of Paying Living Expenses: Not hard at all   Food Insecurity: No Food Insecurity (10/17/2023)    Received from [facility] [employer] [facility]    Hunger Vital Sign     Worried About Running Out of Food in the Last Year: Never true     Ran Out of Food in the Last Year: Never true   Transportation Needs: No Transportation Needs (10/17/2023)    Received from [facility] [employer] [facility] [employer]    PRAPARE - Transportation     Lack of Transportation (Medical): No     Lack of Transportation (Non-Medical): No   Physical Activity: Sufficiently Active (10/17/2023)    Received from [facility] [employer] [facility]    Exercise Vital Sign     Days of Exercise per Week: 7 days     Minutes of Exercise per Session: 30 min   Recent Concern: Physical Activity - Insufficiently Active (10/4/2023)    Exercise Vital Sign     Days of Exercise per Week: 1 day     Minutes of Exercise per Session: 30 min   Stress: Stress Concern Present (10/17/2023)     [facility]    [place] [place] [employer] - Occupational Stress Questionnaire     Feeling of Stress : Rather much   Housing Stability: Low Risk  (10/17/2023)    [facility]    Housing Stability Vital Sign     Unable to Pay for Housing in the Last Year: No     Number of Places Lived in the Last Year: 1     In the last 12 months, was there a time when you did not have a steady place to sleep or slept in a shelter (including now)?: No     Family History   Problem Relation Name Age of Onset    Breast cancer Maternal Grandmother      Breast cancer Maternal Aunt      Ovarian cancer Neg Hx      Colon cancer Neg Hx      Cancer Neg Hx      Esophageal cancer Neg Hx      Uterine cancer Neg Hx      Cervical cancer Neg Hx       OB History    Para Term  AB Living   0 0 0 0 0 0   SAB IAB Ectopic Multiple Live Births   0 0 0 0 0           Current Outpatient Medications   Medication Sig Dispense Refill    acetaZOLAMIDE (DIAMOX) 125 MG Tab Take 1 tablet by mouth 2 (two) times daily 60 tablet 0    diazePAM (VALIUM) 10 MG Tab 5 mg.      diclofenac (VOLTAREN) 75 MG EC tablet Take 1 tablet (75 mg total) by mouth 2 (two) times daily as needed (back pain). 60 tablet 2    DICLOFENAC-BENZALKONIUM CHLOR TOP Diclofenac      multivit with minerals/lutein (MULTIVITAMIN 50 PLUS ORAL) Multivitamin      acetaZOLAMIDE (DIAMOX) 125 MG Tab Take 1 tablet by mouth 2 (two) times daily.      amoxicillin (AMOXIL) 500 MG capsule Take one capsule by mouth every 8 hours 30 capsule 0    diclofenac (VOLTAREN) 75 MG EC tablet 2 (two) times daily.      omeprazole (PRILOSEC) 40 MG capsule Take 1 capsule (40 mg total) by mouth once daily. 30 capsule 11     No current facility-administered medications for this visit.     Allergies: Doxycycline, Medrol [methylprednisolone], and Nsaids (non-steroidal anti-inflammatory drug)     The 10-year ASCVD risk score (Johny TOMLINSON, et al., 2019) is:  2.3%    Values used to calculate the score:      Age: 53 years      Sex: Female      Is Non- : No      Diabetic: No      Tobacco smoker: No      Systolic Blood Pressure: 127 mmHg      Is BP treated: No      HDL Cholesterol: 40 mg/dL      Total Cholesterol: 225 mg/dL      ROS:  Constitutional: no weight loss, weight gain, fever, fatigue  Eyes:  No vision changes, glasses/contacts  ENT/Mouth: No ulcers, sinus problems, ears ringing, headache  Cardiovascular: No inability to lie flat, chest pain, exercise intolerance, swelling, heart palpitations  Respiratory: No wheezing, coughing blood, shortness of breath, or cough  Gastrointestinal: No diarrhea, bloody stool, nausea/vomiting, constipation, gas, hemorrhoids  Genitourinary: No blood in urine, painful urination, urgency of urination, frequency of urination, incomplete emptying, incontinence, abnormal bleeding, painful periods, heavy periods, vaginal discharge, vaginal odor, painful intercourse, sexual problems, bleeding after intercourse.  Musculoskeletal: No muscle weakness  Skin/Breast: No painful breasts, nipple discharge, masses, rash, ulcers  Neurological: No passing out, seizures, numbness, headache  Endocrine: No diabetes, hypothyroid, hyperthyroid, hot flashes, hair loss, abnormal hair growth, acne, +osteopenia  Psychiatric: No depression, crying  Hematologic: No bruises, bleeding, swollen lymph nodes, anemia.      Physical Exam:   Constitutional: She is oriented to person, place, and time. She appears well-developed and well-nourished.      Neck: No tracheal deviation present. No thyromegaly present.    Cardiovascular:       Exam reveals no edema.        Pulmonary/Chest: Effort normal. She exhibits no mass, no tenderness, no deformity and no retraction. Right breast exhibits no inverted nipple, no mass, no nipple discharge, no skin change, no tenderness, presence, no bleeding and no swelling. Left breast exhibits no inverted nipple, no  mass, no nipple discharge, no skin change, no tenderness, presence, no bleeding and no swelling. Breasts are symmetrical.        Abdominal: Soft. She exhibits no distension and no mass. There is no abdominal tenderness. There is no rebound and no guarding. No hernia. Hernia confirmed negative in the left inguinal area.     Genitourinary:    Vagina and uterus normal.   Rectum:      No external hemorrhoid.   There is no rash, tenderness or lesion on the right labia. There is no rash, tenderness or lesion on the left labia. Cervix is normal. No no adexnal prolapse. Right adnexum displays no mass, no tenderness and no fullness. Left adnexum displays no mass, no tenderness and no fullness. No vaginal discharge, tenderness, bleeding, rectocele, cystocele or prolapse of vaginal walls in the vagina. Cervix exhibits no motion tenderness, no discharge and no friability. Uterus is not deviated.           Musculoskeletal: Normal range of motion and moves all extremeties. No edema.       Neurological: She is alert and oriented to person, place, and time.    Skin: No rash noted. No erythema. No pallor.    Psychiatric: She has a normal mood and affect. Her behavior is normal. Judgment and thought content normal.         ASSESSMENT:   well woman  At high risk for breast cancer  PLAN:   mammogram  return annually or prn

## 2024-07-24 ENCOUNTER — HOSPITAL ENCOUNTER (OUTPATIENT)
Dept: RADIOLOGY | Facility: HOSPITAL | Age: 54
Discharge: HOME | End: 2024-07-24
Attending: FAMILY MEDICINE
Payer: COMMERCIAL

## 2024-07-24 DIAGNOSIS — Z12.31 ENCOUNTER FOR SCREENING MAMMOGRAM FOR MALIGNANT NEOPLASM OF BREAST: ICD-10-CM

## 2024-07-24 PROCEDURE — 77063 BREAST TOMOSYNTHESIS BI: CPT | Mod: 26,,, | Performed by: RADIOLOGY

## 2024-07-24 PROCEDURE — 77067 SCR MAMMO BI INCL CAD: CPT | Mod: 26,,, | Performed by: RADIOLOGY

## 2024-07-24 PROCEDURE — 77067 SCR MAMMO BI INCL CAD: CPT | Mod: TC

## 2024-07-24 PROCEDURE — 77063 BREAST TOMOSYNTHESIS BI: CPT | Mod: TC

## 2024-07-29 ENCOUNTER — PATIENT MESSAGE (OUTPATIENT)
Dept: NEUROSURGERY | Facility: CLINIC | Age: 54
End: 2024-07-29

## 2024-07-29 ENCOUNTER — TELEPHONE (OUTPATIENT)
Dept: NEUROSURGERY | Facility: CLINIC | Age: 54
End: 2024-07-29
Payer: COMMERCIAL

## 2024-07-29 ENCOUNTER — OFFICE VISIT (OUTPATIENT)
Dept: NEUROSURGERY | Facility: CLINIC | Age: 54
End: 2024-07-29
Payer: COMMERCIAL

## 2024-07-29 VITALS — DIASTOLIC BLOOD PRESSURE: 83 MMHG | HEART RATE: 91 BPM | SYSTOLIC BLOOD PRESSURE: 121 MMHG

## 2024-07-29 DIAGNOSIS — M53.86 DECREASED RANGE OF MOTION OF INTERVERTEBRAL DISCS OF LUMBAR SPINE: Primary | ICD-10-CM

## 2024-07-29 DIAGNOSIS — G93.2 BENIGN INTRACRANIAL HYPERTENSION: Primary | ICD-10-CM

## 2024-07-29 PROCEDURE — 3079F DIAST BP 80-89 MM HG: CPT | Mod: CPTII,S$GLB,, | Performed by: NEUROLOGICAL SURGERY

## 2024-07-29 PROCEDURE — 99999 PR PBB SHADOW E&M-EST. PATIENT-LVL III: CPT | Mod: PBBFAC,,, | Performed by: NEUROLOGICAL SURGERY

## 2024-07-29 PROCEDURE — 1159F MED LIST DOCD IN RCRD: CPT | Mod: CPTII,S$GLB,, | Performed by: NEUROLOGICAL SURGERY

## 2024-07-29 PROCEDURE — 99205 OFFICE O/P NEW HI 60 MIN: CPT | Mod: S$GLB,,, | Performed by: NEUROLOGICAL SURGERY

## 2024-07-29 PROCEDURE — 3074F SYST BP LT 130 MM HG: CPT | Mod: CPTII,S$GLB,, | Performed by: NEUROLOGICAL SURGERY

## 2024-07-29 NOTE — PROGRESS NOTES
Neurosurgery  History & Physical    Scribe Attestation  I, Maria A Felix, attest that this documentation has been prepared under the direction and in the presence of Lilo Wyatt MD.    SUBJECTIVE:     Chief Complaint: evaluation of possible IIH    History of Present Illness:  Cheryl Espinosa is a 52 y/o female, PMHx of GERD and carpal tunnel, that is referred to me by Dr. Keith Oden for evaluation of IIH. Today she reports constant increased head pressure that differs from headache pain. Pressure localizes around the forehead area and can travel to back of head. She describes sensation as rhythmic and pulsing, more on L than R. Associated with left-sided facial tingling. Worsens when lying down. She has seen ENT and ophthalmology with unremarkable workup. Patient also saw neurology who worked her up for possible IIH as the cause of her symptoms. Lumbar puncture demonstrated opening pressure of 8. Was put on diamox for 7 days, but d/c due to unwanted side effects. The patient also reports occasional numbness and tingling in b/l hands, forearm and feet. Denies headaches. When asked about vision, pt notes no recent changes. She shares that her L eye vision has always been worse than R eye vision. Denies difficulty chewing or swallowing. No recent MVA. Surgical history includes appendectomy, colposcopy, and endometriosis treatment. Has difficulty waking from anesthesia. Allergic to medrol pack (rash) and some incense (nausea, vomiting).    Review of patient's allergies indicates:   Allergen Reactions    Doxycycline Anaphylaxis, Nausea And Vomiting and Other (See Comments)    Medrol [methylprednisolone] Shortness Of Breath and Rash    Nsaids (non-steroidal anti-inflammatory drug) Nausea And Vomiting     Nausea        Current Outpatient Medications   Medication Sig Dispense Refill    acetaZOLAMIDE (DIAMOX) 125 MG Tab Take 1 tablet by mouth 2 (two) times daily 60 tablet 0    acetaZOLAMIDE (DIAMOX) 125 MG Tab  Take 1 tablet by mouth 2 (two) times daily.      amoxicillin (AMOXIL) 500 MG capsule Take one capsule by mouth every 8 hours 30 capsule 0    diazePAM (VALIUM) 10 MG Tab 5 mg.      diclofenac (VOLTAREN) 75 MG EC tablet Take 1 tablet (75 mg total) by mouth 2 (two) times daily as needed (back pain). 60 tablet 2    diclofenac (VOLTAREN) 75 MG EC tablet 2 (two) times daily.      DICLOFENAC-BENZALKONIUM CHLOR TOP Diclofenac      multivit with minerals/lutein (MULTIVITAMIN 50 PLUS ORAL) Multivitamin      omeprazole (PRILOSEC) 40 MG capsule Take 1 capsule (40 mg total) by mouth once daily. 30 capsule 11     No current facility-administered medications for this visit.       Past Medical History:   Diagnosis Date    Abnormal Pap smear of cervix     Carpal tunnel syndrome     GERD (gastroesophageal reflux disease)     History of degenerative disc disease     Metatarsalgia      Past Surgical History:   Procedure Laterality Date    COLONOSCOPY N/A 11/30/2022    Procedure: COLONOSCOPY;  Surgeon: Jennifer Dupont MD;  Location: [facility];  Service: Endoscopy;  Laterality: N/A;    COLPOSCOPY       Family History       Problem Relation (Age of Onset)    Breast cancer Maternal Grandmother, Maternal Aunt          Social History     Socioeconomic History    Marital status: Single   Tobacco Use    Smoking status: Never    Smokeless tobacco: Never   Substance and Sexual Activity    Alcohol use: Yes    Drug use: No    Sexual activity: Not Currently     Social Determinants of Health     Financial Resource Strain: Low Risk  (10/17/2023)    Received [facility]    Overall Financial Resource Strain (CARDIA)     Difficulty of Paying Living Expenses: Not hard at all   Food Insecurity: No Food Insecurity (10/17/2023)    [facility]    Hunger Vital Sign     Worried About Running Out of Food in the Last Year: Never true     Ran Out of Food in the Last Year: Never true   Transportation Needs: No  Transportation Needs (10/17/2023)    [place] [facility]    PRAPARE - Transportation     Lack of Transportation (Medical): No     Lack of Transportation (Non-Medical): No   Physical Activity: Sufficiently Active (10/17/2023)    [place] [facility]    Exercise Vital Sign     Days of Exercise per Week: 7 days     Minutes of Exercise per Session: 30 min   Recent Concern: Physical Activity - Insufficiently Active (10/4/2023)    Exercise Vital Sign     Days of Exercise per Week: 1 day     Minutes of Exercise per Session: 30 min   Stress: Stress Concern Present (10/17/2023)    [place] [facility]    [place] [employer] - Occupational Stress Questionnaire     Feeling of Stress : Rather much   Housing Stability: Low Risk  (10/17/2023)    [place] [facility] [employer] [facility]    Housing Stability Vital Sign     Unable to Pay for Housing in the Last Year: No     Number of Places Lived in the Last Year: 1     In the last 12 months, was there a time when you did not have a steady place to sleep or slept in a shelter (including now)?: No       Review of Systems   Neurological:  Positive for numbness (bilateral hands, forearm, feet).   All other systems reviewed and are negative.    OBJECTIVE:     Vital Signs     There is no height or weight on file to calculate BMI.    Neurosurgery Physical Exam  General: no acute distress  Head: Non-traumatic, normocephalic  Eyes: Pupils equal, EOMI  Neck: Supple, normal ROM, no tenderness to palpation  CVS: Normal rate and rhythm, distal pulses present  Pulm: Symmetric expansion, no respiratory distress  GI: Abdomen nondistended, nontender    MSK: Moves all extremities without restriction, atraumatic  Skin: Dry, intact  Psych: Normal thought content and cognition    Neuro:  Alert, awake, oriented, to self, place, time  Language:  Speech is fluent, goal directed without any noted dysarthria or aphasia    Cranial  nerves:    CNII-XII: Intact on fine exam,   Pupils equal round react to light,   Extraocular muscles are intact  V1 to V3 is intact to light touch,   no facial asymmetry,   Hearing is intact to finger rub and voice  tongue/uvula/palate midline,   shoulder shrug equal,     No pronator drift    Extremities:  Motor:  Upper Extremity    Deltoid Triceps Biceps Wrist  Extension Wrist  Flexion Interosseous   R 5/5 5/5 5/5 5/5 5/5 5/5   L 5/5 5/5 5/5 5/5 5/5 5/5       Thumb   Abduction Thumb  ADDuction Finger  Flexion Finger  Extension     R 5/5 5/5 5/5 5/5     L 5/5 5/5 5/5 5/5        Lower Extremity     Iliopsoas Quadriceps Hamstring Plantarflexion Dorsiflexion EHL   R 5/5 5/5 5/5 5/5 5/5 5/5   L 5/5 5/5 5/5 5/5 5/5 5/5       Reflexes:     DTR: 2+ biceps    2+ triceps   2+ brachioradialis   2+ patellar  2+ Achilles     Murillo's: Negative     Babinski's: Negative     Clonus: Negative     Sensory:      Sensation intact to light touch, temperature sensation, vibration, pinprick     Coordination:      Coordination intact throughout, no dysmetria, normal rapid alternating movements, no dysdiadochokinesia  Cerebellar:  Normal finger-to-nose, normal heel-to-shin  Cervical spine:  No midline cervical tenderness, negative Lhermitte, negative Spurling  Thoracic spine:  No midline thoracic tenderness  Lumbar spine:  No midline lumbar tenderness     Diagnostic Results:  I personally reviewed the patient's Diagnostic Imaging.     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 There appears to be a fairly significant stenosis at the junction of the transverse and sigmoid sinuses. There is no evidence of venous thrombosis.       CT Maxillofacial WO Cont 10/17/23  Small left maxillary sinus mucous retention cyst.   2. S shaped deviated nasal septum.     ASSESSMENT/PLAN:   54 y/o female with history of carpal tunnel, endometriosis, and MRV revealing hypoplastic  right transverse sinus, sigmoid sinus, and internal jugular vein.    Patient presents with constant head pressure patient that she describes as rhythmic and pulsing, associated with L-sided facial tingling. Also demonstrates occasional numbness and tingling in b/l hands, forearm, and feet.   Discussed imaging results from MRV Brain revealing right hypoplastic transverse sinus, sigmoid sinus, and internal jugular vein.  Discussed imaging results from CT Maxillofacial revealing small left maxillary sinus mucous retention cyst. S shaped deviated nasal septum.   After discussion with the patient, I recommend obtaining an angiogram and venogram to better assess the patient's cerebral vasculature. I'd also like to get a repeat MRV and MRI Brain in 6-12 months. Patient should continue to f/u with ophthalmology to evaluate for papilledema. I have answered all of their questions and patient wish to proceed with this plan. We will schedule patient.      Thank you so very much for allowing me to participate in the care of this patient.  Please feel free to call any questions, comments, or concerns.     Lilo Wyatt MD,MSc  Department of Neurosurgery   Department of Radiology  Department of Neurology  [facility]    [facility]   [employer] [facility]     Total time spent in counseling and discussion about further management options including relevant lab work, treatment,  prognosis, medications and intended side effects was more than 60 minutes. More than 50 % of the time was spent in counseling and coordination of care.  I spent a total of 60 minutes on the day of the visit.This includes face to face time and non-face to face time preparing to see the patient (eg, review of tests), Obtaining and/or reviewing separately obtained history, Documenting clinical information in the electronic or other health  record, Independently interpreting resultsand communicating results to the patient/family/caregiver, or Care coordination      Scribe Attestation  I, Dr.Vernard Wyatt personally performed the services described in this documentation. All medical record entries made by the scribe, Maria A Felix, were at my direction and in my presence.  I have reviewed the chart and agree that the record reflects my personal performance and is accurate and complete.

## 2024-08-26 ENCOUNTER — PATIENT MESSAGE (OUTPATIENT)
Dept: NEUROSURGERY | Facility: CLINIC | Age: 54
End: 2024-08-26
Payer: COMMERCIAL

## 2024-09-07 NOTE — PROGRESS NOTES
"[facility]   Physical Therapy Treatment Note     Name: Cheryl Huber  Clinic Number: [id number]    Therapy Diagnosis:   Encounter Diagnoses   Name Primary?    Pain of both elbows Yes    Decreased strength     Trigger point        Physician: Jayashree Valencia MD    Visit Date: 9/27/2022  Physician Orders: PT Eval and Treat   Medical Diagnosis from Referral:   M77.12 (ICD-10-CM) - Lateral epicondylitis of left elbow   M77.11 (ICD-10-CM) - Lateral epicondylitis, right elbow   M25.511,G89.29 (ICD-10-CM) - Chronic right shoulder pain      Evaluation Date: 7/1/2022  Authorization Period Expiration: 12/31/2022  Plan of Care Expiration: 9/20/2022 to 10/7/2022   Visit # / Visits authorized: 13/20 + eval  FOTO#: obtained 8/11/2022 NEXT     Time In: 7:03AM  Time Out: 8:00AM  Total Billable Time: 57 minutes (4TE)     Precautions: Standard  SUBJECTIVE     Pt reports: Pt reports that she started to feel her face stuff again but it wasn't in relation to her exercises. She feels it may be due to her bed that she just got.   She was compliant with home exercise program.  Response to previous treatment: felt good, no pain  Functional change: facial symptoms diminished    Pain: 0/10  Location: right elbow> left elbow    OBJECTIVE     Objective Measures updated at progress report unless specified.     Treatment     Cheryl received the treatments listed below:      Bold = Performed     Cheryl received therapeutic exercises to develop strength, endurance, ROM and posture for 55 minutes including tests and measures above:    UBE 3/3 - Lv 2  Supine towel under thoracic region chest press c shoulder flexion - 3x10 3" 5#  Land mine 5# - bilateral - 3x10  bilateral external rotation c elevation red theraband - 3x10  Bilateral horizontal abd green theraband  - 3x10  Bilateral D2 Flexion 2# - 3x10 bilateral   Biceps curl to overhead press 5# - 3x10   Front raise/lat raise 2# - 3x10   Freemotion SAPD 3# " I just noticed she is scheduled to see me 9/10 for sore throat and sweating. Appointment was made 8/27. Is she still having sxs? Does she have a fever? Did she test for COVID?   3x10      Patient Education and Home Exercises     Home Exercises Provided and Patient Education Provided     Education provided:   - Pt educated on POC  - Pt educated on anatomy and physiology of current conditions as it relates to signs and symptoms  - Pt educated on HEP - provided new worksheet with nerve glides.     Written Home Exercises Provided: Patient instructed to cont prior HEP. Exercises were reviewed and Cheryl was able to demonstrate them prior to the end of the session.  Cheryl demonstrated good  understanding of the education provided. See EMR under Patient Instructions for exercises provided during therapy sessions    ASSESSMENT   Pt presents to PT with no increase in discomfort and a had a good response to last treatment. Pt did well with progression made today in resistance. Pt to address possible systemic inflammation with her doctor at upcoming visit. Pt to continue to be progressed in strength towards DC.     Cheryl Is progressing well towards her goals.   Pt prognosis is Good.      Pt will continue to benefit from skilled outpatient physical therapy to address the deficits listed in the problem list box on initial evaluation, provide pt/family education and to maximize pt's level of independence in the home and community environment.      Pt's spiritual, cultural and educational needs considered and pt agreeable to plan of care and goals.     Anticipated barriers to physical therapy: chronicity     Goals:   Short Term Goals (4 Weeks):   1) 1. Pt will be compliant with initial HEP to supplement PT in decreasing pain with functional mobility. - met  2) Pt will reduce worst pain to 5/10 in order to perform work related tasks. - met  3) Pt will improve right elbow Flex/ext strength to 4+/5 in order to perform home related tasks with less pain - met  4) Pt will be able to work for 1 hour without any increase in pain greater than 2 pts on NPRS - met    PLAN   Continue with gross upper  extremity strengthening and progress PRN.     Naina Jefferson, PT, DPT, Cert. DN

## 2024-10-14 ENCOUNTER — OFFICE VISIT (OUTPATIENT)
Dept: FAMILY MEDICINE | Facility: CLINIC | Age: 54
End: 2024-10-14
Payer: COMMERCIAL

## 2024-10-14 ENCOUNTER — LAB VISIT (OUTPATIENT)
Dept: LAB | Facility: HOSPITAL | Age: 54
End: 2024-10-14
Attending: FAMILY MEDICINE
Payer: COMMERCIAL

## 2024-10-14 VITALS
SYSTOLIC BLOOD PRESSURE: 106 MMHG | OXYGEN SATURATION: 96 % | BODY MASS INDEX: 40.04 KG/M2 | WEIGHT: 212.06 LBS | TEMPERATURE: 98 F | HEART RATE: 80 BPM | HEIGHT: 61 IN | DIASTOLIC BLOOD PRESSURE: 70 MMHG

## 2024-10-14 DIAGNOSIS — Z00.00 ANNUAL PHYSICAL EXAM: Primary | ICD-10-CM

## 2024-10-14 DIAGNOSIS — Z00.00 ANNUAL PHYSICAL EXAM: ICD-10-CM

## 2024-10-14 DIAGNOSIS — Z78.0 ASYMPTOMATIC MENOPAUSAL STATE: ICD-10-CM

## 2024-10-14 LAB
ALBUMIN SERPL BCP-MCNC: 3.8 G/DL (ref 3.5–5.2)
ALP SERPL-CCNC: 89 U/L (ref 55–135)
ALT SERPL W/O P-5'-P-CCNC: 19 U/L (ref 10–44)
ANION GAP SERPL CALC-SCNC: 11 MMOL/L (ref 8–16)
AST SERPL-CCNC: 17 U/L (ref 10–40)
BASOPHILS # BLD AUTO: 0.03 K/UL (ref 0–0.2)
BASOPHILS NFR BLD: 0.6 % (ref 0–1.9)
BILIRUB SERPL-MCNC: 0.6 MG/DL (ref 0.1–1)
BUN SERPL-MCNC: 13 MG/DL (ref 6–20)
CALCIUM SERPL-MCNC: 9.1 MG/DL (ref 8.7–10.5)
CHLORIDE SERPL-SCNC: 109 MMOL/L (ref 95–110)
CO2 SERPL-SCNC: 22 MMOL/L (ref 23–29)
CREAT SERPL-MCNC: 0.9 MG/DL (ref 0.5–1.4)
DIFFERENTIAL METHOD BLD: ABNORMAL
EOSINOPHIL # BLD AUTO: 0.1 K/UL (ref 0–0.5)
EOSINOPHIL NFR BLD: 1.9 % (ref 0–8)
ERYTHROCYTE [DISTWIDTH] IN BLOOD BY AUTOMATED COUNT: 13.4 % (ref 11.5–14.5)
EST. GFR  (NO RACE VARIABLE): >60 ML/MIN/1.73 M^2
ESTIMATED AVG GLUCOSE: 103 MG/DL (ref 68–131)
GLUCOSE SERPL-MCNC: 87 MG/DL (ref 70–110)
HBA1C MFR BLD: 5.2 % (ref 4–5.6)
HCT VFR BLD AUTO: 38.9 % (ref 37–48.5)
HGB BLD-MCNC: 11.7 G/DL (ref 12–16)
IMM GRANULOCYTES # BLD AUTO: 0.02 K/UL (ref 0–0.04)
IMM GRANULOCYTES NFR BLD AUTO: 0.4 % (ref 0–0.5)
LYMPHOCYTES # BLD AUTO: 1.4 K/UL (ref 1–4.8)
LYMPHOCYTES NFR BLD: 26.8 % (ref 18–48)
MCH RBC QN AUTO: 26.5 PG (ref 27–31)
MCHC RBC AUTO-ENTMCNC: 30.1 G/DL (ref 32–36)
MCV RBC AUTO: 88 FL (ref 82–98)
MONOCYTES # BLD AUTO: 0.3 K/UL (ref 0.3–1)
MONOCYTES NFR BLD: 6.5 % (ref 4–15)
NEUTROPHILS # BLD AUTO: 3.4 K/UL (ref 1.8–7.7)
NEUTROPHILS NFR BLD: 63.8 % (ref 38–73)
NRBC BLD-RTO: 0 /100 WBC
PLATELET # BLD AUTO: 307 K/UL (ref 150–450)
PMV BLD AUTO: 10.1 FL (ref 9.2–12.9)
POTASSIUM SERPL-SCNC: 4.1 MMOL/L (ref 3.5–5.1)
PROT SERPL-MCNC: 6.8 G/DL (ref 6–8.4)
RBC # BLD AUTO: 4.42 M/UL (ref 4–5.4)
SODIUM SERPL-SCNC: 142 MMOL/L (ref 136–145)
TSH SERPL DL<=0.005 MIU/L-ACNC: 1.53 UIU/ML (ref 0.4–4)
WBC # BLD AUTO: 5.27 K/UL (ref 3.9–12.7)

## 2024-10-14 PROCEDURE — 85025 COMPLETE CBC W/AUTO DIFF WBC: CPT | Performed by: FAMILY MEDICINE

## 2024-10-14 PROCEDURE — 83036 HEMOGLOBIN GLYCOSYLATED A1C: CPT | Performed by: FAMILY MEDICINE

## 2024-10-14 PROCEDURE — 84443 ASSAY THYROID STIM HORMONE: CPT | Performed by: FAMILY MEDICINE

## 2024-10-14 PROCEDURE — 3074F SYST BP LT 130 MM HG: CPT | Mod: CPTII,S$GLB,, | Performed by: FAMILY MEDICINE

## 2024-10-14 PROCEDURE — 1159F MED LIST DOCD IN RCRD: CPT | Mod: CPTII,S$GLB,, | Performed by: FAMILY MEDICINE

## 2024-10-14 PROCEDURE — 80053 COMPREHEN METABOLIC PANEL: CPT | Performed by: FAMILY MEDICINE

## 2024-10-14 PROCEDURE — 3008F BODY MASS INDEX DOCD: CPT | Mod: CPTII,S$GLB,, | Performed by: FAMILY MEDICINE

## 2024-10-14 PROCEDURE — 99999 PR PBB SHADOW E&M-EST. PATIENT-LVL IV: CPT | Mod: PBBFAC,,, | Performed by: FAMILY MEDICINE

## 2024-10-14 PROCEDURE — 1160F RVW MEDS BY RX/DR IN RCRD: CPT | Mod: CPTII,S$GLB,, | Performed by: FAMILY MEDICINE

## 2024-10-14 PROCEDURE — 99396 PREV VISIT EST AGE 40-64: CPT | Mod: S$GLB,,, | Performed by: FAMILY MEDICINE

## 2024-10-14 PROCEDURE — 3078F DIAST BP <80 MM HG: CPT | Mod: CPTII,S$GLB,, | Performed by: FAMILY MEDICINE

## 2024-10-14 PROCEDURE — 36415 COLL VENOUS BLD VENIPUNCTURE: CPT | Mod: PO | Performed by: FAMILY MEDICINE

## 2024-10-14 NOTE — PROGRESS NOTES
Assessment & Plan  Problem List Items Addressed This Visit    None  Visit Diagnoses       Annual physical exam    -  Primary    Relevant Orders    DXA Bone Density Axial Skeleton 1 or more sites    Comprehensive Metabolic Panel    CBC Auto Differential    Hemoglobin A1C    TSH    Asymptomatic menopausal state        Relevant Orders    DXA Bone Density Axial Skeleton 1 or more sites         I addressed all major concerns as it related to health maintenance.  All were ordered and scheduled based on the patients wishes.  Any additional health maintenance will be readdressed at the next physical if declined or deferred by the patient.    Assessment & Plan      Results        Health Maintenance reviewed.      ______________________________________________________________________    Chief Complaint  Chief Complaint   Patient presents with    Annual Exam       HPI  Cheryl Espinosa is a 53 y.o. female with multiple medical diagnoses as listed in the medical history and problem list that presents for annual exam.  Pt is known to me with last appointment 10/4/2023 .    History of Present Illness       Patient denies any new symptoms including chest pain, SOB, blurry vision, N/V, diarrhea.        PAST MEDICAL HISTORY:  Past Medical History:   Diagnosis Date    Abnormal Pap smear of cervix     Carpal tunnel syndrome     GERD (gastroesophageal reflux disease)     History of degenerative disc disease     Metatarsalgia        PAST SURGICAL HISTORY:  Past Surgical History:   Procedure Laterality Date    COLONOSCOPY N/A 11/30/2022    Procedure: COLONOSCOPY;  Surgeon: Jennifer Dupont MD;  Location: [facility];  Service: Endoscopy;  Laterality: N/A;    COLPOSCOPY         SOCIAL HISTORY:  Social History     Socioeconomic History    Marital status: Single   Tobacco Use    Smoking status: Never    Smokeless tobacco: Never   Substance and Sexual Activity    Alcohol use: Yes    Drug use: No    Sexual activity: Not Currently      Social Drivers of Health     Financial Resource Strain: Low Risk  (10/11/2024)    Overall Financial Resource Strain (CARDIA)     Difficulty of Paying Living Expenses: Not hard at all   Food Insecurity: No Food Insecurity (10/11/2024)    Hunger Vital Sign     Worried About Running Out of Food in the Last Year: Never true     Ran Out of Food in the Last Year: Never true   Transportation Needs: No Transportation Needs (10/17/2023)    [facility]    PRAPARE - Transportation     Lack of Transportation (Medical): No     Lack of Transportation (Non-Medical): No   Physical Activity: Sufficiently Active (10/11/2024)    Exercise Vital Sign     Days of Exercise per Week: 7 days     Minutes of Exercise per Session: 120 min   Stress: Stress Concern Present (10/11/2024)    [employer] - Occupational Stress Questionnaire     Feeling of Stress : To some extent   Housing Stability: Unknown (10/11/2024)    Housing Stability Vital Sign     Unable to Pay for Housing in the Last Year: No       FAMILY HISTORY:  Family History   Problem Relation Name Age of Onset    Breast cancer Maternal Grandmother      Breast cancer Maternal Aunt      Ovarian cancer Neg Hx      Colon cancer Neg Hx      Cancer Neg Hx      Esophageal cancer Neg Hx      Uterine cancer Neg Hx      Cervical cancer Neg Hx         ALLERGIES AND MEDICATIONS: updated and reviewed.  Review of patient's allergies indicates:   Allergen Reactions    Doxycycline Anaphylaxis, Nausea And Vomiting and Other (See Comments)    Medrol [methylprednisolone] Shortness Of Breath and Rash    Nsaids (non-steroidal anti-inflammatory drug) Nausea And Vomiting     Nausea      Current Outpatient Medications   Medication Sig Dispense Refill    diclofenac (VOLTAREN) 75 MG EC tablet Take 1 tablet (75 mg total) by mouth 2 (two) times daily as needed (back pain). 60 tablet 2    multivit with minerals/lutein (MULTIVITAMIN 50 PLUS ORAL)  "Multivitamin      omeprazole (PRILOSEC) 40 MG capsule Take 1 capsule (40 mg total) by mouth once daily. 30 capsule 11    acetaZOLAMIDE (DIAMOX) 125 MG Tab Take 1 tablet by mouth 2 (two) times daily. (Patient not taking: Reported on 10/14/2024)      amoxicillin (AMOXIL) 500 MG capsule Take one capsule by mouth every 8 hours (Patient not taking: Reported on 10/14/2024) 30 capsule 0    diazePAM (VALIUM) 10 MG Tab 5 mg.      diclofenac (VOLTAREN) 75 MG EC tablet 2 (two) times daily. (Patient not taking: Reported on 10/14/2024)      DICLOFENAC-BENZALKONIUM CHLOR TOP Diclofenac       No current facility-administered medications for this visit.         ROS  Review of Systems   Constitutional:  Negative for activity change, appetite change, fatigue, fever and unexpected weight change.   HENT: Negative.  Negative for ear discharge, ear pain, rhinorrhea and sore throat.    Eyes: Negative.    Respiratory:  Negative for apnea, cough, chest tightness, shortness of breath and wheezing.    Cardiovascular:  Negative for chest pain, palpitations and leg swelling.   Gastrointestinal:  Negative for abdominal distention, abdominal pain, constipation, diarrhea and vomiting.   Endocrine: Negative for cold intolerance, heat intolerance, polydipsia and polyuria.   Genitourinary:  Negative for decreased urine volume and urgency.   Musculoskeletal: Negative.    Skin:  Negative for rash.   Neurological:  Negative for dizziness and headaches.   Hematological:  Does not bruise/bleed easily.   Psychiatric/Behavioral:  Negative for agitation, sleep disturbance and suicidal ideas.            Physical Exam  Vitals:    10/14/24 0748   BP: 106/70   Pulse: 80   Temp: 98 °F (36.7 °C)   TempSrc: Oral   SpO2: 96%   Weight: 96.2 kg (212 lb 1.3 oz)   Height: 5' 1" (1.549 m)    Body mass index is 40.07 kg/m².  Weight: 96.2 kg (212 lb 1.3 oz)   Height: 5' 1" (154.9 cm)   Physical Exam  Vitals reviewed.   Constitutional:       Appearance: She is " well-developed.   HENT:      Head: Normocephalic and atraumatic.      Right Ear: External ear normal.      Left Ear: External ear normal.      Nose: Nose normal.   Eyes:      Conjunctiva/sclera: Conjunctivae normal.      Pupils: Pupils are equal, round, and reactive to light.   Neck:      Thyroid: No thyromegaly.      Vascular: No JVD.   Cardiovascular:      Rate and Rhythm: Normal rate and regular rhythm.      Heart sounds: Normal heart sounds.   Pulmonary:      Effort: Pulmonary effort is normal.      Breath sounds: Normal breath sounds. No wheezing.   Abdominal:      General: Bowel sounds are normal. There is no distension.      Palpations: Abdomen is soft.      Tenderness: There is no abdominal tenderness.   Musculoskeletal:         General: Normal range of motion.      Cervical back: Normal range of motion.   Lymphadenopathy:      Cervical: No cervical adenopathy.   Skin:     General: Skin is warm and dry.   Neurological:      Mental Status: She is alert and oriented to person, place, and time.      Deep Tendon Reflexes: Reflexes are normal and symmetric.   Psychiatric:         Behavior: Behavior normal.         Thought Content: Thought content normal.         Judgment: Judgment normal.        Physical Exam           Health Maintenance         Date Due Completion Date    Hepatitis C Screening Never done ---    Shingles Vaccine (1 of 2) Never done ---    COVID-19 Vaccine (4 - 2024-25 season) 09/01/2024 4/7/2022    Mammogram 07/24/2025 7/24/2024    Hemoglobin A1c (Diabetic Prevention Screening) 10/04/2026 10/4/2023    Cervical Cancer Screening 09/21/2027 9/21/2022    Lipid Panel 02/28/2029 2/29/2024    TETANUS VACCINE 11/18/2029 11/18/2019    Colorectal Cancer Screening 11/30/2032 11/30/2022    RSV Vaccine (Age 60+ and Pregnant patients) (1 - 1-dose 75+ series) 11/17/2047 ---                Patient note was created using [employer].  Any errors in syntax or even information may not have been identified and edited on  initial review prior to signing this note.

## 2024-10-30 ENCOUNTER — PATIENT MESSAGE (OUTPATIENT)
Dept: NEUROSURGERY | Facility: CLINIC | Age: 54
End: 2024-10-30
Payer: COMMERCIAL

## 2024-11-01 ENCOUNTER — HOSPITAL ENCOUNTER (OUTPATIENT)
Dept: RADIOLOGY | Facility: CLINIC | Age: 54
Discharge: HOME | End: 2024-11-01
Attending: FAMILY MEDICINE
Payer: COMMERCIAL

## 2024-11-01 DIAGNOSIS — Z78.0 ASYMPTOMATIC MENOPAUSAL STATE: ICD-10-CM

## 2024-11-01 DIAGNOSIS — Z00.00 ANNUAL PHYSICAL EXAM: ICD-10-CM

## 2024-11-01 PROCEDURE — 77080 DXA BONE DENSITY AXIAL: CPT | Mod: TC,PO

## 2024-11-01 PROCEDURE — 77080 DXA BONE DENSITY AXIAL: CPT | Mod: 26,,, | Performed by: INTERNAL MEDICINE

## 2024-12-26 ENCOUNTER — PATIENT MESSAGE (OUTPATIENT)
Dept: HEMATOLOGY/ONCOLOGY | Facility: CLINIC | Age: 54
End: 2024-12-26
Payer: COMMERCIAL

## 2024-12-26 ENCOUNTER — PATIENT MESSAGE (OUTPATIENT)
Dept: NEUROSURGERY | Facility: CLINIC | Age: 54
End: 2024-12-26
Payer: COMMERCIAL

## 2025-01-23 PROBLEM — E66.01 SEVERE OBESITY: Status: ACTIVE | Noted: 2025-01-23

## 2025-01-23 NOTE — PROGRESS NOTES
"  Reason For Consultation:   Increased lifetime risk of breast cancer      Records Obtained: Records of the patients history including those obtained from the referring provider were reviewed and summarized in detail.    HPI:   Cheryl Espinosa presents for follow up for increased risk of breast cancer. She is postmenopausal. She presented for screening mammogram on 22 which was benign but revealed a Tyrer-Cuzick score of 20.77%.       Today, Feels good and no complaints.   No breast concerns. Denies nipple discharge and skin changes. However she does have sharp pains on the side of her breasts.   She does have carpal tunnel and is not sure if this is related to this.   Denies changes to family history. Denies smoking, rare alcohol.   She does follow with Dr. Nguyen.     She has been having pressure in her head since . She has seen 3 different ENTs. She has also been followed by neurology. She has not had good experiences.     High Risk Breast cancer specific history:  - Age: 54 y.o.   - Height:  5'1"  - Weight: 90.5 kg  - Breast density per BI-RADS: Heterogeneously Dense   - Age at menarche:  12  - Number of pregnancies:   - Age at menopause, if applicable:  50 - LMP 2021   -Uterus and ovaries intact: Yes  - HRT: No    - Genetic testing: No  - Personal history of cancer: No  - Previous chest radiation exposure between ages 10-30 years old: No  - Personal history of breast biopsy: No  - Ashkenazi Jain Inheritance: No  - Family history of cancer:     Maternal Grandmother Breast Cancer  at 77    Maternal Aunt Breast Cancer diagnosed; alive at 68   Mother had breast mass removed, but benign - mother  - [place]   Father  from [place]    Brother  from AIDs related lymphoma     Social History:  Tobacco use:  None  Alcohol use:  Rare; Occasional  Exercise regimen: When she can  Employment: Human resources     SEE CALCULATED RISK BELOW.     Past Medical   Past " Medical History:   Diagnosis Date    Abnormal Pap smear of cervix     Carpal tunnel syndrome     GERD (gastroesophageal reflux disease)     History of degenerative disc disease     Metatarsalgia      Patient Active Problem List   Diagnosis    Osteopenia    History of endometriosis    GERD without esophagitis    Chronic bilateral low back pain with bilateral sciatica    Decreased range of motion of intervertebral discs of lumbar spine    Weakness of both lower extremities    Posture imbalance    Chronic pain of right knee    Pain of both elbows    Decreased strength    Trigger point    At high risk for breast cancer    Severe obesity     Social History   Social History     Tobacco Use    Smoking status: Never    Smokeless tobacco: Never   Substance Use Topics    Alcohol use: Yes    Drug use: No     Family History  Family History   Problem Relation Name Age of Onset    Breast cancer Maternal Grandmother      Breast cancer Maternal Aunt      Ovarian cancer Neg Hx      Colon cancer Neg Hx      Cancer Neg Hx      Esophageal cancer Neg Hx      Uterine cancer Neg Hx      Cervical cancer Neg Hx       Medications    Current Outpatient Medications:     acetaZOLAMIDE (DIAMOX) 125 MG Tab, Take 1 tablet by mouth 2 (two) times daily. (Patient not taking: Reported on 10/14/2024), Disp: , Rfl:     amoxicillin (AMOXIL) 500 MG capsule, Take one capsule by mouth every 8 hours (Patient not taking: Reported on 10/14/2024), Disp: 30 capsule, Rfl: 0    diazePAM (VALIUM) 10 MG Tab, 5 mg., Disp: , Rfl:     diclofenac (VOLTAREN) 75 MG EC tablet, Take 1 tablet (75 mg total) by mouth 2 (two) times daily as needed (back pain)., Disp: 60 tablet, Rfl: 2    diclofenac (VOLTAREN) 75 MG EC tablet, 2 (two) times daily. (Patient not taking: Reported on 10/14/2024), Disp: , Rfl:     DICLOFENAC-BENZALKONIUM CHLOR TOP, Diclofenac, Disp: , Rfl:     multivit with minerals/lutein (MULTIVITAMIN 50 PLUS ORAL), Multivitamin, Disp: , Rfl:     omeprazole  "(PRILOSEC) 40 MG capsule, Take 1 capsule (40 mg total) by mouth once daily., Disp: 30 capsule, Rfl: 11  Allergies  Review of patient's allergies indicates:   Allergen Reactions    Doxycycline Anaphylaxis, Nausea And Vomiting and Other (See Comments)    Medrol [methylprednisolone] Shortness Of Breath and Rash    Nsaids (non-steroidal anti-inflammatory drug) Nausea And Vomiting     Nausea        Review of Systems       See above   Review of Systems  Review of Systems   Constitutional:  Positive for appetite change. Negative for unexpected weight change.   HENT:  Negative for mouth sores.    Eyes:  Negative for visual disturbance.   Respiratory:  Positive for shortness of breath. Negative for cough.    Cardiovascular:  Negative for chest pain.   Gastrointestinal:  Negative for abdominal pain and diarrhea.   Genitourinary:  Positive for frequency.   Musculoskeletal:  Positive for back pain.   Skin:  Negative for rash.   Neurological:  Positive for headaches.   Hematological:  Negative for adenopathy.   Psychiatric/Behavioral:  The patient is nervous/anxious.      All other systems reviewed and are negative.    Objective:      Vitals:   Vitals:    02/06/25 1023   BP: 139/70   BP Location: Left arm   Patient Position: Lying   Pulse: 77   SpO2: 97%   Weight: 94.8 kg (208 lb 15.9 oz)   Height: 5' 1" (1.549 m)       BMI: Body mass index is 39.49 kg/m².   Body surface area is 2.02 meters squared.    Physical Exam  Vitals and nursing note reviewed.   Constitutional:       General: She is not in acute distress.     Appearance: Normal appearance. She is well-developed.   HENT:      Head: Normocephalic.   Eyes:      Pupils: Pupils are equal, round, and reactive to light.   Cardiovascular:      Rate and Rhythm: Normal rate and regular rhythm.      Heart sounds: Normal heart sounds.   Pulmonary:      Effort: Pulmonary effort is normal.      Breath sounds: Normal breath sounds.   Abdominal:      General: Bowel sounds are normal. " There is no distension.      Palpations: Abdomen is soft.      Tenderness: There is no abdominal tenderness.   Musculoskeletal:      Cervical back: Normal range of motion.   Lymphadenopathy:      Cervical: No cervical adenopathy.      Upper Body:      Right upper body: No supraclavicular or axillary adenopathy.      Left upper body: No supraclavicular or axillary adenopathy.   Skin:     General: Skin is warm and dry.      Findings: No rash.   Neurological:      Mental Status: She is alert and oriented to person, place, and time.   Psychiatric:         Behavior: Behavior normal.         Laboratory Data: reviewed most recent   Imaging: reviewed most recent        Assessment:     1. At high risk for breast cancer    2. GERD without esophagitis    3. Severe obesity        1. Increased risk of breast cancer   * Tyrer-Cuzick (TC) lifetime risk of 24.1%. We discussed that TC score will categorize your lifetime risk of being diagnosed with breast cancer. Categories are as such: Average risk <15%, Intermediate risk 15-19%, and High risk > or = to 20%.    * The Luh Model for Breast Cancer risk: She has a 1.1% 5-year breast cancer risk (Compared with 1.2% for the average 52 y.o. woman) and 11% Lifetime breast cancer risk (Compared with 11.3% for the average 52 y.o. woman). A woman's risk is considered low if her five-year risk of developing breast cancer is less than 1.6%; it is considered high if she scores above 1.66%. (All women who are over 60 have a score of at least 1.66 and are considered high risk, based on the Luh Model.)      Risk factors are categorized into 2 groups: Modifiable and Non-modifiable. Modifiable risk factors include use of hormones, alcohol, smoking, diet and exercise. Non-modifiable risk factors include breast density, genetics, chest radiation, previous pregnancies, age of first period, and age of menopause.    * For women at high risk for breast cancer, endocrine therapy can reduce the risk of  invasive and/or in situ breast cancers. (tamoxifen for premenopausal or postmenopausal women and raloxifene or exemestane for postmenopausal women).   * Discussed that Tamoxifen 20 mg daily for 5 years has shown to reduce risk of breast cancer by 49% and women with ADH/ALH or LCIS have an even more significant reduction of risk of 86%. Aromatase inhibitors for 5 years have also shown risk reduction in terms of 50-60%. At current, there is not adequate data to recommend longer courses of therapy more than 5 years for risk reduction.    * Tamoxifen has limited data in BRCA 1/2 mutation carriers but limited retrospective data is suggestive of benefit. There is retrospective data that aromatase inhibitors can reduce the risk of contralateral ER positive breast cancers in BRCA 1/2 patients who were taking AIs as adjuvant therapy. There is no data for raloxifen in the population.    * Reviewed risks of Tamoxifen side effects include hot flashes, invasive endometrial cancer in women > 49 years of age (2.3/1000 compared to 0.9/1000), cataracts, increased risk of pulmonary embolism among others. A handout was given to her.   * Reviewed Lifestyle modifications which have shown benefit:  Limit alcohol consumption to less than 1 drink per day (1 ounce liquor, 6 oz wine, 8 oz beer)  Avoid smoking.  Exercise at least 150 minutes per week of moderate intensity aerobic activity or at least 75 minutes of vigorous activity. Exercise can lower the relative risk of breast cancer by ~18-20%.  Maintain healthy weight and avoid post-menopausal weight gain. Avoid processed foods and eat more lean proteins, fruits and vegetables.                * Discussed available resources including genetic counseling, nutrition, weight management.    * Reviewed future screening:   Semiannual (every 6 months) CBE (clinical breast exam).   Annual Breast MRI alternating with an annual MMG. if TC 20% or greater.                   Discussed with patient that  there are several models available for stratifying breast cancer risk, and Tyrer-Nimcozick is presently the model utilized by [facility] and is a model recommended per current NCCN guidelines.    The Luh Model for Breast Cancer risk estimates the absolute 5 year risk and lifetime risk of developing breast cancer. Family history includes only first degree relatives with breast cancer, which is not enough information to estimate the risk of a patient having BRCA mutation. It also underestimates the cancer risk for patients with extensive family history. The Luh Model is a good predictor of risk for populations but not for individuals. It adjusts risk for race/ethnicity. It may underestimate breast cancer risk in patients with atypical hyperplasia and strong family history. The Luh Model was NOT designed to estimate risk for: Women with a prior diagnosis of breast cancer, lobular carcinoma in situ (LCIS), or ductal carcinoma in situ (DCIS);  Women who have received previous radiation therapy to the chest for treatment of Hodgkin lymphoma;  Women with gene mutations in BRCA1 or BRCA2, or those who are known to have certain genetic syndromes that increase risk for breast cancer; Women of age <35 or >85.    We discussed that there are limitations to every model for risk assessment, particularly that TC can overestimate risk in women with atypical hyperplasia and dense breasts and that Luh underestimates risk for those with a strong family history of breast or ovarian cancers as well as non-white women with atypical hyperplasia which can make them appear to not be candidates for risk reducing therapies.       Plan:     Patient has opted out chemoprevention but will call in the future if she wishes to pursue.  Patient elects to proceed with alternating annual mammogram and annual breast MRI along with semiannual CBEs.  Patient will follow up with PCP or GYN for one semiannual CBE along with annual mammogram in  July 2025 and will RTC here for one semiannual CBE along with annual breast MRI in Feb 2025.  Lifestyle modifications as detailed above.   5.   Encouraged breast awareness, including monthly breast self-exams.       RTC in 1 year to see me either at [facility] or on 3rd floor.     Questions were encouraged and answered to patient's satisfaction, and patient verbalized understanding of information and agreement with the plan. Advised patient to RTC with any interval changes or concerns.      Patient is in agreement with the proposed treatment plan. All questions were answered to the patient's satisfaction. Patient knows to call clinic for any new or worsening symptoms and if anything is needed before the next clinic visit.          Lexi Carter, FNP-C  Hematology & Medical Oncology   [facility] [place] [address]  ph. [phone]  Fax. [phone]    Collaborating physician, Dr. Martinez.    Total time spent with the patient: 30 minutes, 20 minutes of face to face consultation and 10 minutes of chart review and coordination of care, on the day of the visit. This includes face to face time and non-face to face time preparing to see the patient (eg, review of tests), obtaining and/or reviewing separately obtained history, documenting clinical information in the electronic or other health record, independently interpreting resultsand communicating results to the patient/family/caregiver, or care coordination.      Route Chart for Scheduling    Med Onc Chart Routing      Follow up with physician    Follow up with ZAY 1 year.   Infusion scheduling note    Injection scheduling note    Labs    Imaging Mammogram   in july   Pharmacy appointment    Other referrals

## 2025-01-27 ENCOUNTER — PATIENT MESSAGE (OUTPATIENT)
Dept: INTERVENTIONAL RADIOLOGY/VASCULAR | Facility: HOSPITAL | Age: 55
End: 2025-01-27
Payer: COMMERCIAL

## 2025-01-27 NOTE — NURSING
Pre-procedure call complete.  2 patient identifier used (name and ).  Pt instructed not to eat anything after midnight the night before procedure.  Pt aware will need someone to provide transport home and monitor pt 8 hours post procedure.  No driving for at least 24 hours after procedure.    Patient verbalized aware of which medications to take.  Do not take  sleep medication (including OTC) and anxiety medication the night before procedure.  Arrival time and location given.  Expected length of stay reviewed.  Covid screening completed.  Pt verbalized understanding of all pre-procedure instructions.  Written instructions and directions sent to patient in Mychart/portal.

## 2025-01-29 ENCOUNTER — CLINICAL SUPPORT (OUTPATIENT)
Dept: OTHER | Facility: CLINIC | Age: 55
End: 2025-01-29
Payer: COMMERCIAL

## 2025-01-29 DIAGNOSIS — Z00.8 ENCOUNTER FOR OTHER GENERAL EXAMINATION: ICD-10-CM

## 2025-01-29 PROCEDURE — 99401 PREV MED CNSL INDIV APPRX 15: CPT | Mod: S$GLB,,, | Performed by: INTERNAL MEDICINE

## 2025-01-29 PROCEDURE — 80061 LIPID PANEL: CPT | Mod: QW,S$GLB,, | Performed by: INTERNAL MEDICINE

## 2025-01-29 PROCEDURE — 82947 ASSAY GLUCOSE BLOOD QUANT: CPT | Mod: QW,S$GLB,, | Performed by: INTERNAL MEDICINE

## 2025-01-30 ENCOUNTER — HOSPITAL ENCOUNTER (OUTPATIENT)
Dept: INTERVENTIONAL RADIOLOGY/VASCULAR | Facility: HOSPITAL | Age: 55
Discharge: HOME | End: 2025-01-30
Attending: NEUROLOGICAL SURGERY
Payer: COMMERCIAL

## 2025-01-30 VITALS
BODY MASS INDEX: 40.64 KG/M2 | RESPIRATION RATE: 18 BRPM | OXYGEN SATURATION: 96 % | SYSTOLIC BLOOD PRESSURE: 121 MMHG | HEIGHT: 60 IN | DIASTOLIC BLOOD PRESSURE: 64 MMHG | WEIGHT: 207 LBS | HEART RATE: 76 BPM | TEMPERATURE: 98 F

## 2025-01-30 VITALS
SYSTOLIC BLOOD PRESSURE: 130 MMHG | WEIGHT: 205 LBS | HEIGHT: 60 IN | DIASTOLIC BLOOD PRESSURE: 78 MMHG | BODY MASS INDEX: 40.25 KG/M2

## 2025-01-30 DIAGNOSIS — G93.2 BENIGN INTRACRANIAL HYPERTENSION: ICD-10-CM

## 2025-01-30 LAB
B-HCG UR QL: NEGATIVE
CTP QC/QA: YES
HDLC SERPL-MCNC: 37 MG/DL
POC CHOLESTEROL, LDL (DOCK): 186 MG/DL
POC CHOLESTEROL, TOTAL: 250 MG/DL
POC GLUCOSE, FASTING: 83 MG/DL (ref 60–110)
TRIGL SERPL-MCNC: 148 MG/DL

## 2025-01-30 PROCEDURE — 75870 VEIN X-RAY SKULL: CPT | Mod: 26,59,, | Performed by: NEUROLOGICAL SURGERY

## 2025-01-30 PROCEDURE — 36012 PLACE CATHETER IN VEIN: CPT | Mod: 59 | Performed by: NEUROLOGICAL SURGERY

## 2025-01-30 PROCEDURE — 63600175 PHARM REV CODE 636 W HCPCS: Performed by: STUDENT IN AN ORGANIZED HEALTH CARE EDUCATION/TRAINING PROGRAM

## 2025-01-30 PROCEDURE — 81025 URINE PREGNANCY TEST: CPT | Performed by: NEUROLOGICAL SURGERY

## 2025-01-30 PROCEDURE — 36226 PLACE CATH VERTEBRAL ART: CPT | Mod: 51,50,, | Performed by: NEUROLOGICAL SURGERY

## 2025-01-30 PROCEDURE — 36224 PLACE CATH CAROTD ART: CPT | Mod: 50,,, | Performed by: NEUROLOGICAL SURGERY

## 2025-01-30 PROCEDURE — C1769 GUIDE WIRE: HCPCS

## 2025-01-30 PROCEDURE — C1760 CLOSURE DEV, VASC: HCPCS

## 2025-01-30 PROCEDURE — 36227 PLACE CATH XTRNL CAROTID: CPT | Mod: 50,,, | Performed by: NEUROLOGICAL SURGERY

## 2025-01-30 PROCEDURE — 36224 PLACE CATH CAROTD ART: CPT | Mod: 50 | Performed by: NEUROLOGICAL SURGERY

## 2025-01-30 PROCEDURE — 25500020 PHARM REV CODE 255: Performed by: NEUROLOGICAL SURGERY

## 2025-01-30 PROCEDURE — 36012 PLACE CATHETER IN VEIN: CPT | Mod: 59,,, | Performed by: NEUROLOGICAL SURGERY

## 2025-01-30 PROCEDURE — C1757 CATH, THROMBECTOMY/EMBOLECT: HCPCS

## 2025-01-30 PROCEDURE — C1889 IMPLANT/INSERT DEVICE, NOC: HCPCS

## 2025-01-30 PROCEDURE — C1894 INTRO/SHEATH, NON-LASER: HCPCS

## 2025-01-30 PROCEDURE — 75870 VEIN X-RAY SKULL: CPT | Mod: TC,59 | Performed by: NEUROLOGICAL SURGERY

## 2025-01-30 PROCEDURE — 75860 VEIN X-RAY NECK: CPT | Mod: 26,59,, | Performed by: NEUROLOGICAL SURGERY

## 2025-01-30 PROCEDURE — 99152 MOD SED SAME PHYS/QHP 5/>YRS: CPT | Performed by: NEUROLOGICAL SURGERY

## 2025-01-30 PROCEDURE — 36226 PLACE CATH VERTEBRAL ART: CPT | Mod: 50 | Performed by: NEUROLOGICAL SURGERY

## 2025-01-30 PROCEDURE — 99153 MOD SED SAME PHYS/QHP EA: CPT | Performed by: NEUROLOGICAL SURGERY

## 2025-01-30 PROCEDURE — 99152 MOD SED SAME PHYS/QHP 5/>YRS: CPT | Mod: ,,, | Performed by: NEUROLOGICAL SURGERY

## 2025-01-30 PROCEDURE — 36227 PLACE CATH XTRNL CAROTID: CPT | Mod: 50 | Performed by: NEUROLOGICAL SURGERY

## 2025-01-30 PROCEDURE — G0269 OCCLUSIVE DEVICE IN VEIN ART: HCPCS | Performed by: NEUROLOGICAL SURGERY

## 2025-01-30 RX ORDER — FENTANYL CITRATE 50 UG/ML
INJECTION, SOLUTION INTRAMUSCULAR; INTRAVENOUS
Status: COMPLETED | OUTPATIENT
Start: 2025-01-30 | End: 2025-01-30

## 2025-01-30 RX ORDER — LIDOCAINE HYDROCHLORIDE 10 MG/ML
INJECTION, SOLUTION INFILTRATION; PERINEURAL
Status: COMPLETED | OUTPATIENT
Start: 2025-01-30 | End: 2025-01-30

## 2025-01-30 RX ORDER — MIDAZOLAM HYDROCHLORIDE 1 MG/ML
INJECTION, SOLUTION INTRAMUSCULAR; INTRAVENOUS
Status: COMPLETED | OUTPATIENT
Start: 2025-01-30 | End: 2025-01-30

## 2025-01-30 RX ORDER — SODIUM CHLORIDE 9 MG/ML
INJECTION, SOLUTION INTRAVENOUS CONTINUOUS
Status: DISCONTINUED | OUTPATIENT
Start: 2025-01-30 | End: 2025-01-31 | Stop reason: HOSPADM

## 2025-01-30 RX ORDER — SODIUM CHLORIDE 0.9 % (FLUSH) 0.9 %
10 SYRINGE (ML) INJECTION
Status: DISCONTINUED | OUTPATIENT
Start: 2025-01-30 | End: 2025-01-31 | Stop reason: HOSPADM

## 2025-01-30 RX ORDER — LIDOCAINE HYDROCHLORIDE 10 MG/ML
1 INJECTION, SOLUTION EPIDURAL; INFILTRATION; INTRACAUDAL; PERINEURAL ONCE
Status: DISCONTINUED | OUTPATIENT
Start: 2025-01-30 | End: 2025-01-31 | Stop reason: HOSPADM

## 2025-01-30 RX ORDER — IODIXANOL 320 MG/ML
300 INJECTION, SOLUTION INTRAVASCULAR
Status: COMPLETED | OUTPATIENT
Start: 2025-01-30 | End: 2025-01-30

## 2025-01-30 RX ADMIN — LIDOCAINE HYDROCHLORIDE 5 ML: 10 INJECTION, SOLUTION INFILTRATION; PERINEURAL at 01:01

## 2025-01-30 RX ADMIN — IODIXANOL 175 ML: 320 INJECTION, SOLUTION INTRAVASCULAR at 02:01

## 2025-01-30 RX ADMIN — MIDAZOLAM HYDROCHLORIDE 1 MG: 2 INJECTION, SOLUTION INTRAMUSCULAR; INTRAVENOUS at 01:01

## 2025-01-30 RX ADMIN — FENTANYL CITRATE 50 MCG: 50 INJECTION, SOLUTION INTRAMUSCULAR; INTRAVENOUS at 01:01

## 2025-01-30 NOTE — PLAN OF CARE
Procedure completed. Patient tolerated well; VSS. Hemostasis achieved to right AND left groin via [place] at 1425; patient to remain flat until 1625. Site CDI without hematoma. Patient to be transported to MPU accompanied by this RN; report to be given at the bedside.

## 2025-01-30 NOTE — NURSING
Procedure recovery complete. HOB was elevated VSS. Procedure site dressing to Right and Left groin are clean, dry, and intact. Patient tolerated PO nutrition with no N/V and voided without difficulty. Patient verbalizes understanding of discharge instructions including when to call the doctor. PIV removed. Patient discharged via wheelchair to garage.

## 2025-01-30 NOTE — PLAN OF CARE
Patient arrived to IR old rocu for post procedure, cerebral angiogram and venogram, recovery. Awaiting on bed available in mpu. Report received from LONNIE Vo. NAD noted. Dressings CDI. VSS, afebrile. Patient placed on continuous cardiac and pulse ox monitoring. Bed at lowest position, side rails x2, call light and belongings within reach.

## 2025-01-30 NOTE — PLAN OF CARE
"Patient ambulated on unit this morning with her sister in law at her side.  Verbalized an understanding of the procedure.  Oriented to unit and call bell provided.  UPT negative this morning on arrival.  Pt states she has some pain in her chest area but is unsure if the pain is muscle soreness because of "shoveling snow" last week.  Pt states it is on both sides of her chest area and is a 1 on a scale of 0-10.  Pt states she also has anxiety.  Notified cielo with IR at ext [phone] to report pt c/o of the pain in her chest and she stated that she would inform the fellow.  No new orders received at this time.  Continuing the plan of care.   "

## 2025-01-30 NOTE — PLAN OF CARE
Venogram pressures:  Sagital Sinus  Anterior 3rd- 19  Middle 3rd- 18  Posterior 3rd- 17    Torcula- 15    Transverse sinus LEFT  Proximal- 18  Middle- 17  Distal- 17    Sigmoid Sinus LEFT  Proximal- 18  Middle 14  Distal 14    Jugular Bulb LEFT- 16

## 2025-01-30 NOTE — PLAN OF CARE
Pt arrived to IR for cerebral angiogram and venogram. Pt oriented to unit and staff. Plan of care reviewed with patient, patient verbalizes understanding. Comfort measures utilized. Pt safely transferred from stretcher to procedural table. Fall risk reviewed with patient, fall risk interventions maintained. Safety strap applied, positioner pillows utilized to minimize pressure points. Blankets applied. Pt prepped and draped utilizing standard sterile technique. Patient placed on continuous monitoring, as required by sedation policy. Timeouts completed utilizing standard universal time-out, per department and facility policy. RN to remain at bedside, continuous monitoring maintained. Pt resting comfortably. Denies pain/discomfort. Will continue to monitor. See flow sheets for monitoring, medication administration, and updates.

## 2025-01-30 NOTE — DISCHARGE INSTRUCTIONS
Cerebral Angiography    What happens during a cerebral angiography?    An IV (intravenous line is started in your arm. You may be given a medicine that helps you relax (sedative)  Youre given an injection to numb the site where the catheter will be inserted. This is usually in the groin area  A small puncture is made into the artery, and the catheter is inserted into the blood vessel. Using X-rays, the catheter is then carefully guided through the artery.  Contrast fluid is injected through the catheter into the artery. You may feel warmth or pressure in your Head, neck, or chest. You may be asked to hold your breath and be still during injections. When the procedure is complete, the catheter is removed and pressure is held at the groin or wrist.    What happens after cerebral angiography    Youll be taken to a recovery area.  You will probably need to lay flat for 2-4 hours    Once you are at home  Dont drive for 24 hours  Avoid walking bending, lifting, and taking stairs for 24 hours.  Avoid lifting anything over 5 pounds for 7 days  Be sure to follow any other instructions from your doctor    When should I call my health care provider?    Call your doctor if you have any of the following:    Severe headache, visual problems, new weakness, dizziness, or trouble speaking  Fever of 100.4 (38C) or higher lasting for 24 to 48 hours  Bleeding, swelling, or a large lump at the insertions site  Sharp or increasing pain at the insertion site  Leg pain, numbness, or a cold leg or foot  Any other symptoms your provider instructed you to report based on your medical condition.    Contact information:    For immediate concerns that are not emergent, you may call our interventional radiology clinic at [phone]    ** After hours and weekends: Call [phone] and ask for the Interventional Radiology Physician on call**

## 2025-01-30 NOTE — PROCEDURES
Interventional Neuroradiology Post-Procedure Note     Pre Op Diagnosis: IIH     Post Op Diagnosis: Same     Procedure:  DIAGNOSTIC CEREBRAL ANGIOGRAM     Procedure performed by:Lilo Wyatt MD; Jose ALLISON, Melina Vilchis MD, Rashmi     Written Informed Consent Obtained: Yes     Specimen Removed: NO     Estimated Blood Loss: Minimal     Procedure report:      A 5F sheath was placed into the right common femoral vein. Using 5 Guyanese Herminio catheter was advanced from the right internal jugular vein to do baseline veno-graphy.  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    Using the synchro 0.014 microwire, velocity microcatheter was advanced into superior sagittal sinus, torcula, left-sided transverse sinus, left-sided sigmoid sinus and left jugular bulb.    A right femoral vein angiogram was performed, the sheath removed and hemostasis achieved using Venous Vascade Device. No hematoma was present at the time of hemostasis.    Six vessel diagnostic cerebral angiogram performed via left common femoral artery using 5 Guyanese sheath.  Left femoral artery angiogram was performed, the sheath removed and hemostasis achieved using closure 5 Guyanese Vascade.  No hematoma was present at the time of hemostasis.           Venogram pressures measured with no gradient shown below.  Sagital Sinus  Anterior 3rd- 19  Middle 3rd- 18  Posterior 3rd- 17     Torcula- 15     Transverse sinus LEFT  Proximal- 18  Middle- 17  Distal- 17     Sigmoid Sinus LEFT  Proximal- 18  Middle 14  Distal 14     Jugular Bulb LEFT- 16          The patient tolerated the procedure well.    Plan:  -To recovery for monitoring  -Bed rest for 2h  -Groin check and pulse check q2h   -Avoid carrying heavy weights > 10 lbs x 24 hrs   -Remove groin dressing tomorrow               Rashmi Vilchis MD  Fellow, NeuroEndovascular Surgery, [facility]

## 2025-01-30 NOTE — Clinical Note
Bilateral: Groin.   Scrubbed with ChloroPrep With Tint.    Hair: N/A.  Skin prep dry before draping.  Prepped by: Jean Osuna, RT 1/30/2025 1:10 PM.

## 2025-01-30 NOTE — NURSING
Patient arrived to MPU bay 5 s/p Cerebral. Dressing to R and L groin is clean and dry. Cardiac monitoring continued. Fall precautions reviewed. Bed in lowest, locked position. Call light within reach.

## 2025-01-30 NOTE — H&P
Interventional Neuroradiology Pre-procedure Note    Procedure: Diagnostic cerebral angiogram    History of Present Illness:  Cheryl Espinosa is a 54 y.o. female with PMHx of GERD and carpal tunnel. She has seen ENT and ophthalmology with unremarkable workup. Patient also saw neurology who worked her up for possible IIH as the cause of her symptoms. Lumbar puncture demonstrated opening pressure of 8. Was put on diamox for 7 days, but d/c due to unwanted side effects. The patient also reports occasional numbness and tingling in b/l hands, forearm and feet. She presents for DSA and venogram.    ROS:   Hematological: no known coagulopathies  Respiratory: no shortness of breath  Cardiovascular: no chest pain  Gastrointestinal: no abdominal pain  Genito-Urinary: no dysuria  Musculoskeletal: negative  Neurological: no TIA or stroke symptoms     Scheduled Meds:   Current Meds:   Current Outpatient Medications:     acetaZOLAMIDE (DIAMOX) 125 MG Tab, Take 1 tablet by mouth 2 (two) times daily. (Patient not taking: Reported on 10/14/2024), Disp: , Rfl:     amoxicillin (AMOXIL) 500 MG capsule, Take one capsule by mouth every 8 hours (Patient not taking: Reported on 10/14/2024), Disp: 30 capsule, Rfl: 0    diazePAM (VALIUM) 10 MG Tab, 5 mg., Disp: , Rfl:     diclofenac (VOLTAREN) 75 MG EC tablet, Take 1 tablet (75 mg total) by mouth 2 (two) times daily as needed (back pain)., Disp: 60 tablet, Rfl: 2    diclofenac (VOLTAREN) 75 MG EC tablet, 2 (two) times daily. (Patient not taking: Reported on 10/14/2024), Disp: , Rfl:     DICLOFENAC-BENZALKONIUM CHLOR TOP, Diclofenac, Disp: , Rfl:     multivit with minerals/lutein (MULTIVITAMIN 50 PLUS ORAL), Multivitamin, Disp: , Rfl:     omeprazole (PRILOSEC) 40 MG capsule, Take 1 capsule (40 mg total) by mouth once daily., Disp: 30 capsule, Rfl: 11   Continuous Infusions:   PRN Meds:    Allergies:   Review of patient's allergies indicates:   Allergen Reactions    Doxycycline  Anaphylaxis, Nausea And Vomiting and Other (See Comments)    Medrol [methylprednisolone] Shortness Of Breath and Rash    Nsaids (non-steroidal anti-inflammatory drug) Nausea And Vomiting     Nausea      Sedation Hx: No adverse events.    Labs:              Objective:  Vitals: Last menstrual period 06/09/2019.     Physical Exam:  General: well developed, well nourished, no distress.   Head: normocephalic, atraumatic  Neck: No tracheal deviation. No palpable masses. Full ROM.   Neurologic: Alert and oriented. Thought content appropriate.  GCS: E4 V5 M6; Total: 15  Language: No aphasia  Speech: No dysarthria  Cranial nerves: face symmetric, tongue midline, CN II-XII grossly intact.   Eyes: pupils equal, round, reactive to light with accomodation, EOMI.   Pulmonary: normal respirations, no signs of respiratory distress  Abdomen: soft, non-distended, not tender to palpation  Vascular: Pulses 2+ and symmetric radial and dorsalis pedis. No LE edema.   Skin: Skin is warm, dry and intact.  Sensory: intact to light touch throughout  Motor Strength: Moves all extremities spontaneously with good tone.  Full strength upper and lower extremities. No abnormal movements seen.     ASA: 2  MAL: 2    Plan:  -Plan for cerebral angiogram and venogram with venous pressure measurements  -Sedation Plan: moderate sedation   -All diagnostics and imaging reviewed  -Patient NPO since MN  -Risks & benefits of procedure explained in detail; patient consented and all questions answered  -Further reccs to follow procedure          Sonido Garcia MD, MHA  Fellow, NeuroEndovascular Surgery, [facility] [place] [facility]  Neurologist, [facility] [place], [place]

## 2025-02-04 ENCOUNTER — TELEPHONE (OUTPATIENT)
Dept: NEUROSURGERY | Facility: CLINIC | Age: 55
End: 2025-02-04
Payer: COMMERCIAL

## 2025-02-04 NOTE — TELEPHONE ENCOUNTER
Returned call to Dr. Levine's office. Provided medical record number. Informed that Dr. Wyatt hasn't finished dictating the venogram note yet. Staff v/u

## 2025-02-04 NOTE — TELEPHONE ENCOUNTER
----- Message from Anni sent at 2/4/2025  1:11 PM CST -----  Dr Levine calling to get latest clinical notes on pt . Also needs the repot for the Angio and venogram       Fax [phone]  call back [phone]

## 2025-02-06 ENCOUNTER — OFFICE VISIT (OUTPATIENT)
Dept: HEMATOLOGY/ONCOLOGY | Facility: CLINIC | Age: 55
End: 2025-02-06
Payer: COMMERCIAL

## 2025-02-06 VITALS
HEART RATE: 77 BPM | HEIGHT: 61 IN | DIASTOLIC BLOOD PRESSURE: 70 MMHG | OXYGEN SATURATION: 97 % | SYSTOLIC BLOOD PRESSURE: 139 MMHG | WEIGHT: 209 LBS | BODY MASS INDEX: 39.46 KG/M2

## 2025-02-06 DIAGNOSIS — K21.9 GERD WITHOUT ESOPHAGITIS: ICD-10-CM

## 2025-02-06 DIAGNOSIS — R92.333 HETEROGENEOUSLY DENSE TISSUE OF BOTH BREASTS ON MAMMOGRAPHY: ICD-10-CM

## 2025-02-06 DIAGNOSIS — E66.01 SEVERE OBESITY: ICD-10-CM

## 2025-02-06 DIAGNOSIS — Z91.89 AT HIGH RISK FOR BREAST CANCER: Primary | ICD-10-CM

## 2025-02-06 PROCEDURE — 1159F MED LIST DOCD IN RCRD: CPT | Mod: CPTII,S$GLB,, | Performed by: NURSE PRACTITIONER

## 2025-02-06 PROCEDURE — 3008F BODY MASS INDEX DOCD: CPT | Mod: CPTII,S$GLB,, | Performed by: NURSE PRACTITIONER

## 2025-02-06 PROCEDURE — 3078F DIAST BP <80 MM HG: CPT | Mod: CPTII,S$GLB,, | Performed by: NURSE PRACTITIONER

## 2025-02-06 PROCEDURE — 1160F RVW MEDS BY RX/DR IN RCRD: CPT | Mod: CPTII,S$GLB,, | Performed by: NURSE PRACTITIONER

## 2025-02-06 PROCEDURE — G2211 COMPLEX E/M VISIT ADD ON: HCPCS | Mod: S$GLB,,, | Performed by: NURSE PRACTITIONER

## 2025-02-06 PROCEDURE — 99214 OFFICE O/P EST MOD 30 MIN: CPT | Mod: S$GLB,,, | Performed by: NURSE PRACTITIONER

## 2025-02-06 PROCEDURE — 99999 PR PBB SHADOW E&M-EST. PATIENT-LVL IV: CPT | Mod: PBBFAC,,, | Performed by: NURSE PRACTITIONER

## 2025-02-06 PROCEDURE — 3075F SYST BP GE 130 - 139MM HG: CPT | Mod: CPTII,S$GLB,, | Performed by: NURSE PRACTITIONER

## 2025-02-10 ENCOUNTER — OFFICE VISIT (OUTPATIENT)
Dept: NEUROSURGERY | Facility: CLINIC | Age: 55
End: 2025-02-10
Payer: COMMERCIAL

## 2025-02-10 ENCOUNTER — PATIENT MESSAGE (OUTPATIENT)
Dept: NEUROSURGERY | Facility: CLINIC | Age: 55
End: 2025-02-10

## 2025-02-10 ENCOUNTER — TELEPHONE (OUTPATIENT)
Dept: NEUROSURGERY | Facility: CLINIC | Age: 55
End: 2025-02-10
Payer: COMMERCIAL

## 2025-02-10 VITALS — SYSTOLIC BLOOD PRESSURE: 128 MMHG | DIASTOLIC BLOOD PRESSURE: 77 MMHG | HEART RATE: 80 BPM

## 2025-02-10 DIAGNOSIS — M53.86 DECREASED RANGE OF MOTION OF INTERVERTEBRAL DISCS OF LUMBAR SPINE: Primary | ICD-10-CM

## 2025-02-10 DIAGNOSIS — G93.2 IIH (IDIOPATHIC INTRACRANIAL HYPERTENSION): ICD-10-CM

## 2025-02-10 DIAGNOSIS — M54.9 DORSALGIA, UNSPECIFIED: Primary | ICD-10-CM

## 2025-02-10 PROCEDURE — 99999 PR PBB SHADOW E&M-EST. PATIENT-LVL II: CPT | Mod: PBBFAC,,, | Performed by: NEUROLOGICAL SURGERY

## 2025-02-10 PROCEDURE — 3078F DIAST BP <80 MM HG: CPT | Mod: CPTII,S$GLB,, | Performed by: NEUROLOGICAL SURGERY

## 2025-02-10 PROCEDURE — 99215 OFFICE O/P EST HI 40 MIN: CPT | Mod: S$GLB,,, | Performed by: NEUROLOGICAL SURGERY

## 2025-02-10 PROCEDURE — 3074F SYST BP LT 130 MM HG: CPT | Mod: CPTII,S$GLB,, | Performed by: NEUROLOGICAL SURGERY

## 2025-02-10 NOTE — PROGRESS NOTES
"Neurosurgery  Established Patient    Scribe Attestation  I, Maria A Felix, attest that this documentation has been prepared under the direction and in the presence of Lilo Wyatt MD.    SUBJECTIVE:     History of Present Illness 07/29/24:  Cheryl Espinosa is a 54 y/o female, PMHx of GERD and carpal tunnel, that is referred to me by Dr. Keith Oden for evaluation of IIH. Today she reports constant increased head pressure that differs from headache pain. Pressure localizes around the forehead area and can travel to back of head. She describes sensation as rhythmic and pulsing, more on L than R. Associated with left-sided facial tingling. Worsens when lying down. She has seen ENT and ophthalmology with unremarkable workup. Patient also saw neurology who worked her up for possible IIH as the cause of her symptoms. Lumbar puncture demonstrated opening pressure of 8. Was put on diamox for 7 days, but d/c due to unwanted side effects. The patient also reports occasional numbness and tingling in b/l hands, forearm and feet. Denies headaches. When asked about vision, pt notes no recent changes. She shares that her L eye vision has always been worse than R eye vision. Denies difficulty chewing or swallowing. No recent MVA. Surgical history includes appendectomy, colposcopy, and endometriosis treatment. Has difficulty waking from anesthesia. Allergic to medrol pack (rash) and some incense (nausea, vomiting).     Interval Hx 02/10/25:  Patient returns to clinic with updated imaging. Today she shares that she was in immense pain while undergoing venogram. She states being able to "feel everything" and "feeling like I was going to die" throughout procedure. Following, she has been having L > R ear pressure and tingling on the L side of her face that extends down to her neck. She will be following up with her otolaryngologist soon. Pt also reports random, sharp facial pains that occur on the L more than R side. She has " also had two severe HA's which were not normal for her. Denies changes in vision. Pt also notes having a history of cervical stenosis which causes her neck pain. Also has history of lower back issues and pain which she tolerates. Has not had any surgeries in this area.     Review of patient's allergies indicates:   Allergen Reactions    Doxycycline Anaphylaxis, Nausea And Vomiting and Other (See Comments)    Medrol [methylprednisolone] Shortness Of Breath and Rash    Nsaids (non-steroidal anti-inflammatory drug) Nausea And Vomiting     Nausea        Current Outpatient Medications   Medication Sig Dispense Refill    acetaZOLAMIDE (DIAMOX) 125 MG Tab Take 1 tablet by mouth 2 (two) times daily. (Patient not taking: Reported on 10/14/2024)      amoxicillin (AMOXIL) 500 MG capsule Take one capsule by mouth every 8 hours (Patient not taking: Reported on 10/14/2024) 30 capsule 0    diazePAM (VALIUM) 10 MG Tab 5 mg.      diclofenac (VOLTAREN) 75 MG EC tablet Take 1 tablet (75 mg total) by mouth 2 (two) times daily as needed (back pain). 60 tablet 2    diclofenac (VOLTAREN) 75 MG EC tablet 2 (two) times daily. (Patient not taking: Reported on 10/14/2024)      DICLOFENAC-BENZALKONIUM CHLOR TOP Diclofenac      multivit with minerals/lutein (MULTIVITAMIN 50 PLUS ORAL) Multivitamin      omeprazole (PRILOSEC) 40 MG capsule Take 1 capsule (40 mg total) by mouth once daily. 30 capsule 11     No current facility-administered medications for this visit.       Past Medical History:   Diagnosis Date    Abnormal Pap smear of cervix     Carpal tunnel syndrome     GERD (gastroesophageal reflux disease)     History of degenerative disc disease     Metatarsalgia      Past Surgical History:   Procedure Laterality Date    COLONOSCOPY N/A 11/30/2022    Procedure: COLONOSCOPY;  Surgeon: Jennifer Dupont MD;  Location: [facility];  Service: Endoscopy;  Laterality: N/A;    COLPOSCOPY       Family History       Problem Relation (Age of Onset)     Breast cancer Maternal Grandmother, Maternal Aunt          Social History     Socioeconomic History    Marital status: Single   Tobacco Use    Smoking status: Never    Smokeless tobacco: Never   Substance and Sexual Activity    Alcohol use: Yes    Drug use: No    Sexual activity: Not Currently     Social Drivers of Health     Financial Resource Strain: Low Risk  (10/11/2024)    Overall Financial Resource Strain (CARDIA)     Difficulty of Paying Living Expenses: Not hard at all   Food Insecurity: No Food Insecurity (10/11/2024)    Hunger Vital Sign     Worried About Running Out of Food in the Last Year: Never true     Ran Out of Food in the Last Year: Never true   Transportation Needs: No Transportation Needs (10/17/2023)    Received [facility] [place]    PRAPARE - Transportation     Lack of Transportation (Medical): No     Lack of Transportation (Non-Medical): No   Physical Activity: Sufficiently Active (10/11/2024)    Exercise Vital Sign     Days of Exercise per Week: 7 days     Minutes of Exercise per Session: 120 min   Stress: Stress Concern Present (10/11/2024)    [employer] - Occupational Stress Questionnaire     Feeling of Stress : To some extent   Housing Stability: Unknown (10/11/2024)    Housing Stability Vital Sign     Unable to Pay for Housing in the Last Year: No       Review of Systems   HENT:          L > R ear pressure   Musculoskeletal:  Positive for back pain and neck pain.   Neurological:  Positive for headaches.   All other systems reviewed and are negative.      OBJECTIVE:     Vital Signs     There is no height or weight on file to calculate BMI.    Neurosurgery Physical Exam  General: no acute distress  Head: Non-traumatic, normocephalic  Eyes: Pupils equal, EOMI  Neck: Supple, normal ROM, no tenderness to palpation  CVS: Normal rate and rhythm, distal pulses present  Pulm: Symmetric expansion, no respiratory distress  GI: Abdomen nondistended,  nontender    MSK: Moves all extremities without restriction, atraumatic  Skin: Dry, intact  Psych: Normal thought content and cognition    Neuro:  Alert, awake, oriented, to self, place, time  Language:  Speech is fluent, goal directed without any noted dysarthria or aphasia    Cranial nerves:    CNII-XII: Intact on fine exam,   Pupils equal round react to light,   Extraocular muscles are intact  V1 to V3 is intact to light touch,   no facial asymmetry,   Hearing is intact to finger rub and voice  tongue/uvula/palate midline,   shoulder shrug equal,     No pronator drift    Extremities:  Motor:  Upper Extremity    Deltoid Triceps Biceps Wrist  Extension Wrist  Flexion Interosseous   R 5/5 5/5 5/5 5/5 5/5 5/5   L 5/5 5/5 5/5 5/5 5/5 5/5       Thumb   Abduction Thumb  ADDuction Finger  Flexion Finger  Extension     R 5/5 5/5 5/5 5/5     L 5/5 5/5 5/5 5/5        Lower Extremity     Iliopsoas Quadriceps Hamstring Plantarflexion Dorsiflexion EHL   R 5/5 5/5 5/5 5/5 5/5 5/5   L 5/5 5/5 5/5 5/5 5/5 5/5       Reflexes:     DTR: 2+ biceps    2+ triceps   2+ brachioradialis   2+ patellar  2+ Achilles     Murillo's: Negative     Babinski's: Negative     Clonus: Negative     Sensory:      Sensation intact to light touch, temperature sensation, vibration, pinprick     Coordination:      Coordination intact throughout, no dysmetria, normal rapid alternating movements, no dysdiadochokinesia  Cerebellar:  Normal finger-to-nose, normal heel-to-shin  Cervical spine:  No midline cervical tenderness, negative Lhermitte, negative Spurling  Thoracic spine:  No midline thoracic tenderness  Lumbar spine:  No midline lumbar tenderness     Diagnostic Results:  I personally reviewed the patient's Diagnostic Imaging.     IR Angiogram Carotid Cerebral Bilateral 01/30/25  1.  Cerebral angiogram, no evidence of underlying aneurysm, AVM, or dural AV fistula.     2.  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    ASSESSMENT/PLAN:   53 y/o female with history of carpal tunnel, neck pain due to cervical stenosis, and hypoplastic right transverse sinus, sigmoid sinus, and right internal jugular vein.     Patient presents with L > R ear pressure, tingling on the L side of her face that extends down to her neck, random facial pains, and 2 severe headaches. Also has neck pain attributed to cervical stenosis and back pain.    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 No evidence of underlying aneurysm, AVM, or dural AV fistula.    After discussion with the patient, I recommend getting an XR of the cervical spine with flexion and extension as I believe the patient's cervical stenosis may be a possible contributor to her symptoms. I will also recommend getting an MRI of the cervical and lumbar spine. I will also place a referral to physical therapy for her neck pain. I have answered all of their questions and patient wish to proceed with this plan. We will schedule patient.      Thank you so very much for allowing me to participate in the care of this patient.  Please feel free to call any questions, comments, or concerns.     Lilo Wyatt MD,MSc  Department of Neurosurgery   Department of Radiology  Department of Neurology  [facility]  [facility]    [facility]   [employer] [facility]     Total time spent in counseling and discussion about further management options including relevant lab work, treatment,  prognosis, medications and intended side effects was more than 60 minutes. More than 50 % of the time was spent in counseling and coordination of care.  I spent a total of 60 minutes on the day of the visit.This includes  face to face time and non-face to face time preparing to see the patient (eg, review of tests), Obtaining and/or reviewing separately obtained history, Documenting clinical information in the electronic or other health record, Independently interpreting resultsand communicating results to the patient/family/caregiver, or Care coordination.     Scribe Attestation  I, Dr. Lilo Wyatt personally performed the services described in this documentation. All medical record entries made by the scribe, Maria A Felix, were at my direction and in my presence.  I have reviewed the chart and agree that the record reflects my personal performance and is accurate and complete.

## 2025-02-20 ENCOUNTER — HOSPITAL ENCOUNTER (OUTPATIENT)
Dept: RADIOLOGY | Facility: HOSPITAL | Age: 55
Discharge: HOME | End: 2025-02-20
Attending: NURSE PRACTITIONER
Payer: COMMERCIAL

## 2025-02-20 DIAGNOSIS — R92.333 HETEROGENEOUSLY DENSE TISSUE OF BOTH BREASTS ON MAMMOGRAPHY: ICD-10-CM

## 2025-02-20 DIAGNOSIS — Z91.89 AT HIGH RISK FOR BREAST CANCER: ICD-10-CM

## 2025-02-20 PROCEDURE — A9577 INJ MULTIHANCE: HCPCS | Performed by: NURSE PRACTITIONER

## 2025-02-20 PROCEDURE — 77049 MRI BREAST C-+ W/CAD BI: CPT | Mod: TC

## 2025-02-20 PROCEDURE — 25500020 PHARM REV CODE 255: Performed by: NURSE PRACTITIONER

## 2025-02-20 RX ADMIN — GADOBENATE DIMEGLUMINE 20 ML: 529 INJECTION, SOLUTION INTRAVENOUS at 04:02

## 2025-02-24 ENCOUNTER — TELEPHONE (OUTPATIENT)
Dept: NEUROSURGERY | Facility: CLINIC | Age: 55
End: 2025-02-24
Payer: COMMERCIAL

## 2025-02-24 DIAGNOSIS — M54.9 DORSALGIA, UNSPECIFIED: Primary | ICD-10-CM

## 2025-02-28 ENCOUNTER — HOSPITAL ENCOUNTER (OUTPATIENT)
Dept: RADIOLOGY | Facility: HOSPITAL | Age: 55
Discharge: HOME | End: 2025-02-28
Attending: NEUROLOGICAL SURGERY
Payer: COMMERCIAL

## 2025-02-28 DIAGNOSIS — M54.9 DORSALGIA, UNSPECIFIED: ICD-10-CM

## 2025-02-28 PROCEDURE — 72141 MRI NECK SPINE W/O DYE: CPT | Mod: TC

## 2025-02-28 PROCEDURE — 72040 X-RAY EXAM NECK SPINE 2-3 VW: CPT | Mod: 26,,, | Performed by: RADIOLOGY

## 2025-02-28 PROCEDURE — 72141 MRI NECK SPINE W/O DYE: CPT | Mod: 26,,, | Performed by: RADIOLOGY

## 2025-02-28 PROCEDURE — 72040 X-RAY EXAM NECK SPINE 2-3 VW: CPT | Mod: TC,FY

## 2025-02-28 PROCEDURE — 72148 MRI LUMBAR SPINE W/O DYE: CPT | Mod: 26,,, | Performed by: RADIOLOGY

## 2025-02-28 PROCEDURE — 72148 MRI LUMBAR SPINE W/O DYE: CPT | Mod: TC

## 2025-03-10 ENCOUNTER — OFFICE VISIT (OUTPATIENT)
Dept: NEUROSURGERY | Facility: CLINIC | Age: 55
End: 2025-03-10
Payer: COMMERCIAL

## 2025-03-10 VITALS — DIASTOLIC BLOOD PRESSURE: 76 MMHG | HEART RATE: 80 BPM | SYSTOLIC BLOOD PRESSURE: 113 MMHG

## 2025-03-10 DIAGNOSIS — G93.2 IIH (IDIOPATHIC INTRACRANIAL HYPERTENSION): ICD-10-CM

## 2025-03-10 DIAGNOSIS — M54.9 DORSALGIA, UNSPECIFIED: Primary | ICD-10-CM

## 2025-03-10 PROCEDURE — 99999 PR PBB SHADOW E&M-EST. PATIENT-LVL III: CPT | Mod: PBBFAC,,, | Performed by: NEUROLOGICAL SURGERY

## 2025-03-10 PROCEDURE — 1159F MED LIST DOCD IN RCRD: CPT | Mod: CPTII,S$GLB,, | Performed by: NEUROLOGICAL SURGERY

## 2025-03-10 PROCEDURE — 99215 OFFICE O/P EST HI 40 MIN: CPT | Mod: S$GLB,,, | Performed by: NEUROLOGICAL SURGERY

## 2025-03-10 PROCEDURE — 3078F DIAST BP <80 MM HG: CPT | Mod: CPTII,S$GLB,, | Performed by: NEUROLOGICAL SURGERY

## 2025-03-10 PROCEDURE — 3074F SYST BP LT 130 MM HG: CPT | Mod: CPTII,S$GLB,, | Performed by: NEUROLOGICAL SURGERY

## 2025-03-10 NOTE — PROGRESS NOTES
"Neurosurgery  Established Patient    Scribe Attestation  I, Maria A Felix, attest that this documentation has been prepared under the direction and in the presence of Lilo Wyatt MD.    SUBJECTIVE:     History of Present Illness 07/29/24:  Cheryl Espinosa is a 52 y/o female, PMHx of GERD and carpal tunnel, that is referred to me by Dr. Keith Oden for evaluation of IIH. Today she reports constant increased head pressure that differs from headache pain. Pressure localizes around the forehead area and can travel to back of head. She describes sensation as rhythmic and pulsing, more on L than R. Associated with left-sided facial tingling. Worsens when lying down. She has seen ENT and ophthalmology with unremarkable workup. Patient also saw neurology who worked her up for possible IIH as the cause of her symptoms. Lumbar puncture demonstrated opening pressure of 8. Was put on diamox for 7 days, but d/c due to unwanted side effects. The patient also reports occasional numbness and tingling in b/l hands, forearm and feet. Denies headaches. When asked about vision, pt notes no recent changes. She shares that her L eye vision has always been worse than R eye vision. Denies difficulty chewing or swallowing. No recent MVA. Surgical history includes appendectomy, colposcopy, and endometriosis treatment. Has difficulty waking from anesthesia. Allergic to medrol pack (rash) and some incense (nausea, vomiting).     Interval Hx 02/10/25:  Patient returns to clinic with updated imaging. Today she shares that she was in immense pain while undergoing venogram. She states being able to "feel everything" and "feeling like I was going to die" throughout procedure. Following, she has been having L > R ear pressure and tingling on the L side of her face that extends down to her neck. She will be following up with her otolaryngologist soon. Pt also reports random, sharp facial pains that occur on the L more than R side. She has " "also had two severe HA's which were not normal for her. Denies changes in vision. Pt also notes having a history of cervical stenosis which causes her neck pain. Also has history of lower back issues and pain which she tolerates. Has not had any surgeries in this area.      Interval Hx 03/10/25:  Patient returns to clinic with updated imaging to evaluate for cervical stenosis. Today she reports being more concerned with still having "sharp" and "inflamed" facial pain (L > R) that radiates down her neck and worsens when lying down. Has increased in severity since her last visit and causes discomfort. Pt denies head pain. This has been present since 2022. She also continues to have intermittent L > R ear pressure that she feels has become more sensitive. She plans to follow up with her ENT again soon. She will also f/u with her ophthalmologist in July.       Review of patient's allergies indicates:   Allergen Reactions    Doxycycline Anaphylaxis, Nausea And Vomiting and Other (See Comments)    Medrol [methylprednisolone] Shortness Of Breath and Rash    Nsaids (non-steroidal anti-inflammatory drug) Nausea And Vomiting     Nausea        Current Medications[1]    Past Medical History:   Diagnosis Date    Abnormal Pap smear of cervix     Carpal tunnel syndrome     GERD (gastroesophageal reflux disease)     History of degenerative disc disease     Metatarsalgia      Past Surgical History:   Procedure Laterality Date    COLONOSCOPY N/A 11/30/2022    Procedure: COLONOSCOPY;  Surgeon: Jennifer Dupont MD;  Location: [facility];  Service: Endoscopy;  Laterality: N/A;    COLPOSCOPY       Family History       Problem Relation (Age of Onset)    Breast cancer Maternal Grandmother, Maternal Aunt          Social History     Socioeconomic History    Marital status: Single   Tobacco Use    Smoking status: Never    Smokeless tobacco: Never   Substance and Sexual Activity    Alcohol use: Yes    Drug use: No    Sexual activity: Not " Currently     Social Drivers of Health     Financial Resource Strain: Low Risk  (10/11/2024)    Overall Financial Resource Strain (CARDIA)     Difficulty of Paying Living Expenses: Not hard at all   Food Insecurity: No Food Insecurity (10/11/2024)    Hunger Vital Sign     Worried About Running Out of Food in the Last Year: Never true     Ran Out of Food in the Last Year: Never true   Transportation Needs: No Transportation Needs (10/17/2023)    Received from [facility] - Transportation     Lack of Transportation (Medical): No     Lack of Transportation (Non-Medical): No   Physical Activity: Sufficiently Active (10/11/2024)    Exercise Vital Sign     Days of Exercise per Week: 7 days     Minutes of Exercise per Session: 120 min   Stress: Stress Concern Present (10/11/2024)    [employer] [place] [employer] - Occupational Stress Questionnaire     Feeling of Stress : To some extent   Housing Stability: Unknown (10/11/2024)    Housing Stability Vital Sign     Unable to Pay for Housing in the Last Year: No       Review of Systems   HENT:          Ear pressure (L > R), facial pain (L > R)   All other systems reviewed and are negative.      OBJECTIVE:     Vital Signs     There is no height or weight on file to calculate BMI.    Neurosurgery Physical Exam  General: no acute distress  Head: Non-traumatic, normocephalic  Eyes: Pupils equal, EOMI  Neck: Supple, normal ROM, no tenderness to palpation  CVS: Normal rate and rhythm, distal pulses present  Pulm: Symmetric expansion, no respiratory distress  GI: Abdomen nondistended, nontender    MSK: Moves all extremities without restriction, atraumatic  Skin: Dry, intact  Psych: Normal thought content and cognition    Neuro:  Alert, awake, oriented, to self, place, time  Language:  Speech is fluent, goal directed without any noted dysarthria or aphasia    Cranial nerves:    CNII-XII: Intact on fine exam,   Pupils equal round react to light,   Extraocular  muscles are intact  V1 to V3 is intact to light touch,   no facial asymmetry,   Hearing is intact to finger rub and voice  tongue/uvula/palate midline,   shoulder shrug equal,     No pronator drift    Extremities:  Motor:  Upper Extremity    Deltoid Triceps Biceps Wrist  Extension Wrist  Flexion Interosseous   R 5/5 5/5 5/5 5/5 5/5 5/5   L 5/5 5/5 5/5 5/5 5/5 5/5       Thumb   Abduction Thumb  ADDuction Finger  Flexion Finger  Extension     R 5/5 5/5 5/5 5/5     L 5/5 5/5 5/5 5/5        Lower Extremity     Iliopsoas Quadriceps Hamstring Plantarflexion Dorsiflexion EHL   R 5/5 5/5 5/5 5/5 5/5 5/5   L 5/5 5/5 5/5 5/5 5/5 5/5       Reflexes:     DTR: 2+ biceps    2+ triceps   2+ brachioradialis   2+ patellar  2+ Achilles     Murillo's: Negative     Babinski's: Negative     Clonus: Negative     Sensory:      Sensation intact to light touch, temperature sensation, vibration, pinprick     Coordination:      Coordination intact throughout, no dysmetria, normal rapid alternating movements, no dysdiadochokinesia  Cerebellar:  Normal finger-to-nose, normal heel-to-shin  Cervical spine:  No midline cervical tenderness, negative Lhermitte, negative Spurling  Thoracic spine:  No midline thoracic tenderness  Lumbar spine:  No midline lumbar tenderness     Diagnostic Results:  I personally reviewed the patient's Diagnostic Imaging.     XR Cervical Spine Flex/Ex 02/28/25  There is grade 1 anterolisthesis of C2 on C3 through C4 on C5 with flexion which reduces with extension positioning.  There is trace grade 1 retrolisthesis of C5 on C6 with extension.  The cervical vertebral body heights and contours are relatively stable without evidence for acute fracture.       MRI Cervical Spine WO Cont 02/28/25  Spondylo degenerative change cervical spine most pronounced at C5/C6 with posterior disc osteophyte, uncovertebral joint hypertrophy and facet arthropathy with mild moderate central canal stenosis and moderate to severe bilateral  neural foraminal stenosis     No cervical cord signal abnormality to suggest edema.     MRI Lumbar Spine WO Cont 02/28/25  Degenerative change of the lumbar spine most pronounced at the lower lumbar levels as detailed above with small posterior disc osteophytes and facet joint arthropathy without significant central canal stenosis and mild neural foraminal stenosis.    ASSESSMENT/PLAN:   53 y/o female with cervical stenosis and complicated history of intermittent bilateral ear pressure and worsening facial pains with unremarkable workup for IIH by ENT and ophthalmology.     Patient presents with worsening facial pain (L > R) and intermittent bilateral ear pressure (L > R).    Discussed imaging results of XR Cervical Spine revealing grade 1 anterolisthesis of C2 on C3 through C4 on C5 with flexion which reduces with extension positioning. There is trace grade 1 retrolisthesis of C5 on C6 with extension. The cervical vertebral body heights and contours are relatively stable without evidence for acute fracture.  Discussed imaging results of MRI Cervical Spine revealing spondylo degenerative change cervical spine most pronounced at C5/C6 with posterior disc osteophyte, uncovertebral joint hypertrophy and facet arthropathy with mild moderate central canal stenosis and moderate to severe bilateral neural foraminal stenosis.  Discussed imaging results of MRI Lumbar Spine revealing degenerative change of the lumbar spine most pronounced at the lower lumbar levels as detailed above with small posterior disc osteophytes and facet joint arthropathy without significant central canal stenosis and mild neural foraminal stenosis.    After discussion with the patient, I recommend for the patient to continue follow up with her ENT and ophthalmologist. Patient can f/u with me after these consults, no intervention warranted at this time. I have answered all of their questions and patient wish to proceed with this plan. We will  schedule patient.      Thank you so very much for allowing me to participate in the care of this patient.  Please feel free to call any questions, comments, or concerns.     Lilo Wyatt MD,MSc  Department of Neurosurgery   Department of Radiology  Department of Neurology  [facility]    [facility]   [employer] [facility]     Total time spent in counseling and discussion about further management options including relevant lab work, treatment,  prognosis, medications and intended side effects was more than 60 minutes. More than 50 % of the time was spent in counseling and coordination of care.  I spent a total of 60 minutes on the day of the visit.This includes face to face time and non-face to face time preparing to see the patient (eg, review of tests), Obtaining and/or reviewing separately obtained history, Documenting clinical information in the electronic or other health record, Independently interpreting resultsand communicating results to the patient/family/caregiver, or Care coordination.     Scribe Attestation  I, Dr. Lilo Wyatt personally performed the services described in this documentation. All medical record entries made by the scribe, Maria A Felix, were at my direction and in my presence.  I have reviewed the chart and agree that the record reflects my personal performance and is accurate and complete.           [1]   Current Outpatient Medications   Medication Sig Dispense Refill    acetaZOLAMIDE (DIAMOX) 125 MG Tab Take 1 tablet by mouth 2 (two) times daily. (Patient not taking: Reported on 10/14/2024)      amoxicillin (AMOXIL) 500 MG capsule Take one capsule by mouth every 8 hours (Patient not taking: Reported on 10/14/2024) 30 capsule 0    diazePAM (VALIUM) 10 MG Tab 5 mg.      diclofenac (VOLTAREN) 75 MG EC tablet Take 1 tablet (75 mg total) by mouth 2 (two) times  daily as needed (back pain). 60 tablet 2    diclofenac (VOLTAREN) 75 MG EC tablet 2 (two) times daily. (Patient not taking: Reported on 10/14/2024)      DICLOFENAC-BENZALKONIUM CHLOR TOP Diclofenac      multivit with minerals/lutein (MULTIVITAMIN 50 PLUS ORAL) Multivitamin      omeprazole (PRILOSEC) 40 MG capsule Take 1 capsule (40 mg total) by mouth once daily. 30 capsule 11     No current facility-administered medications for this visit.

## 2025-03-11 ENCOUNTER — PATIENT MESSAGE (OUTPATIENT)
Dept: NEUROSURGERY | Facility: CLINIC | Age: 55
End: 2025-03-11
Payer: COMMERCIAL

## 2025-03-17 PROBLEM — R53.1 DECREASED STRENGTH: Status: RESOLVED | Noted: 2022-07-01 | Resolved: 2025-03-17

## 2025-03-17 PROBLEM — M25.521 PAIN OF BOTH ELBOWS: Status: RESOLVED | Noted: 2022-07-01 | Resolved: 2025-03-17

## 2025-03-17 PROBLEM — M79.10 TRIGGER POINT: Status: RESOLVED | Noted: 2022-07-01 | Resolved: 2025-03-17

## 2025-03-17 PROBLEM — M25.522 PAIN OF BOTH ELBOWS: Status: RESOLVED | Noted: 2022-07-01 | Resolved: 2025-03-17

## 2025-03-17 PROBLEM — M53.82: Status: ACTIVE | Noted: 2025-03-17

## 2025-03-17 PROBLEM — Z74.09 IMPAIRED FUNCTIONAL MOBILITY AND ACTIVITY TOLERANCE: Status: ACTIVE | Noted: 2025-03-17

## 2025-04-15 ENCOUNTER — PATIENT MESSAGE (OUTPATIENT)
Dept: ORTHOPEDICS | Facility: CLINIC | Age: 55
End: 2025-04-15
Payer: COMMERCIAL

## 2025-04-22 DIAGNOSIS — M17.0 PRIMARY OSTEOARTHRITIS OF BOTH KNEES: ICD-10-CM

## 2025-04-22 DIAGNOSIS — M77.11 LATERAL EPICONDYLITIS OF BOTH ELBOWS: Primary | ICD-10-CM

## 2025-04-22 DIAGNOSIS — M77.12 LATERAL EPICONDYLITIS OF BOTH ELBOWS: Primary | ICD-10-CM

## 2025-04-22 NOTE — PROGRESS NOTES
Follow-Up Appointment     Assessment: 54 y.o. female with bilateral knee arthritis    I explained my diagnostic impression and the reasoning behind it in detail, using layman's terms.      Plan:   - refilled diclofenac   - XR stable, symptoms stable. Continue NSAIDs PRN, can consider injections if pain worsens   - RTC PRN    All questions were answered in detail. The patient is in full agreement with the treatment plan and will proceed accordingly.    Chief Complaint   Patient presents with    Left Knee - Pain    Right Knee - Pain      Initial visit (5/26/22): Cheryl Espinosa is a 54 y.o. female who presents today complaining of Pain of the Left Knee and Pain of the Right Knee     Duration of symptoms: about 5 months. She thinks she has had it on and off over the years but thought it was associated with carpal tunnel   Trauma or new activity: no, maybe she was cleaning out her house around the time that it started  Pain is constant   Is pointing to her shoulder as site of pain with radiation down the arm - this is worse at night   Pain is the worse over the lateral elbow   Aggravating factors: worse at night (wakes her up), does have daytime pain with activity. Worse if she is not moving for long periods of time   Has pain in the shoulder and elbow with reaching overhead, reaching behind her back   Relieving factors: rest   Radicular symptoms:occ neck pain  Has n/t down the entire arm into the hands. Has been treated for carpal tunnel in the past with Dr. Barraza. Has had multiple injections.   Prior treatment:  prescription NSAIDs with improvement in pain.   Has not done wrist braces. Has tried doing some stretches of the shoulders at home.  [place] gave her a HEP which has not been doing for the last three weeks. She recently started a new job and has been stressed about it.   Pain does interfere with sleep and activities of daily living .  Does have some pain in both arms but worse on the right      8/18/22  Pain over lateral elbow mostly resolved - sometimes has pain if she carries something or does a lot of activity   Now has some pain in the extensor muscle belly - PT thinks this is related to muscle soreness as she is able to increase activity. I agree this is most likely   Now pain I always below 5   Not at her stressful job anymore     Knee is episodic, usually starts with flexion of the knee. Is unable to bear weight on the knee or straighten it out   Better with stretching, resting for a moment and then it goes away    10/17/22  Knee and elbow feeling better   Still has some pain     5/1/25  Patient returns for follow up for bilateral knee OA.   New imaging obtained today.   Pain is stable   Still taking diclofenac     This is the extent of the patient's complaints at this time.     Hand dominance: Desk worker   Has ergonomic set up at work        Review of patient's allergies indicates:   Allergen Reactions    Doxycycline Anaphylaxis, Nausea And Vomiting and Other (See Comments)    Medrol [methylprednisolone] Shortness Of Breath and Rash    Nsaids (non-steroidal anti-inflammatory drug) Nausea And Vomiting     Nausea          Current Outpatient Medications:     acetaZOLAMIDE (DIAMOX) 125 MG Tab, Take 1 tablet by mouth 2 (two) times daily. (Patient not taking: Reported on 10/14/2024), Disp: , Rfl:     amoxicillin (AMOXIL) 500 MG capsule, Take one capsule by mouth every 8 hours (Patient not taking: Reported on 10/14/2024), Disp: 30 capsule, Rfl: 0    diazePAM (VALIUM) 10 MG Tab, 5 mg., Disp: , Rfl:     diclofenac (VOLTAREN) 75 MG EC tablet, Take 1 tablet (75 mg total) by mouth 2 (two) times daily as needed (back pain)., Disp: 60 tablet, Rfl: 2    diclofenac (VOLTAREN) 75 MG EC tablet, 2 (two) times daily. (Patient not taking: Reported on 10/14/2024), Disp: , Rfl:     DICLOFENAC-BENZALKONIUM CHLOR TOP, Diclofenac, Disp: , Rfl:     multivit with minerals/lutein (MULTIVITAMIN 50 PLUS ORAL), Multivitamin,  Disp: , Rfl:     omeprazole (PRILOSEC) 40 MG capsule, Take 1 capsule (40 mg total) by mouth once daily., Disp: 30 capsule, Rfl: 11    Physical Exam:   General: Patient is alert, awake and oriented to time, place and person. Mood and affect are appropriate.  Patient does not appear to be in any distress, denies any constitutional symptoms and appears stated age.   HEENT:  Pupils are equal and round, sclera are not injected. External examination of ears and nose reveals no abnormalities. Cranial nerves II-X are grossly intact  Skin: no rashes, abrasions or open wounds on the affected extremity   Resp:  No respiratory distress or audible wheezing   CV: 2+  pulses, all extremities warm and well perfused   Bilateral knees  TTP over the medial joint line - worse on R than L   No effusion   1+ edema   Ltsi s/s/sp/dp/t  + ehl/fhl/ta/gs  2+ DP     Imagin views of the bilateral knees show mild to moderate     This note was created by combination of typed  and M-Modal dictation. Transcription and phonetic errors may be present.  If there are any questions, please contact me.    Past Medical History:   Diagnosis Date    Abnormal Pap smear of cervix     Carpal tunnel syndrome     GERD (gastroesophageal reflux disease)     History of degenerative disc disease     Metatarsalgia        Active Problem List with Overview Notes    Diagnosis Date Noted    Impaired functional mobility and activity tolerance 2025    Limited active range of motion (AROM) of cervical spine on rotation to left 2025    Severe obesity 2025    At high risk for breast cancer 2022    Chronic pain of right knee 10/06/2021    Chronic bilateral low back pain with bilateral sciatica 2021    Decreased range of motion of intervertebral discs of lumbar spine 2021    Weakness of both lower extremities 2021    Posture imbalance 2021    Osteopenia 2016     From depo-provera.  Needs DEXA scan every 3  years.       History of endometriosis 08/05/2016     See Dr. Herring to complete colonoscopy as she had extensive endometriosis that involved her colon     Located in uterus, ovaries, colon.       GERD without esophagitis 08/05/2016       Past Surgical History:   Procedure Laterality Date    COLONOSCOPY N/A 11/30/2022    Procedure: COLONOSCOPY;  Surgeon: Jennifer Dupont MD;  Location: [facility];  Service: Endoscopy;  Laterality: N/A;    COLPOSCOPY         Family History   Problem Relation Name Age of Onset    Breast cancer Maternal Grandmother      Breast cancer Maternal Aunt      Ovarian cancer Neg Hx      Colon cancer Neg Hx      Cancer Neg Hx      Esophageal cancer Neg Hx      Uterine cancer Neg Hx      Cervical cancer Neg Hx

## 2025-05-01 ENCOUNTER — OFFICE VISIT (OUTPATIENT)
Dept: ORTHOPEDICS | Facility: CLINIC | Age: 55
End: 2025-05-01
Attending: ORTHOPAEDIC SURGERY
Payer: COMMERCIAL

## 2025-05-01 ENCOUNTER — APPOINTMENT (OUTPATIENT)
Dept: RADIOLOGY | Facility: HOSPITAL | Age: 55
End: 2025-05-01
Attending: ORTHOPAEDIC SURGERY
Payer: COMMERCIAL

## 2025-05-01 DIAGNOSIS — M17.0 PRIMARY OSTEOARTHRITIS OF BOTH KNEES: Primary | ICD-10-CM

## 2025-05-01 DIAGNOSIS — M17.0 PRIMARY OSTEOARTHRITIS OF BOTH KNEES: ICD-10-CM

## 2025-05-01 PROCEDURE — 73564 X-RAY EXAM KNEE 4 OR MORE: CPT | Mod: TC,50,FY,PN

## 2025-05-01 PROCEDURE — 1160F RVW MEDS BY RX/DR IN RCRD: CPT | Mod: CPTII,S$GLB,, | Performed by: ORTHOPAEDIC SURGERY

## 2025-05-01 PROCEDURE — 73564 X-RAY EXAM KNEE 4 OR MORE: CPT | Mod: 26,,, | Performed by: RADIOLOGY

## 2025-05-01 PROCEDURE — 99999 PR PBB SHADOW E&M-EST. PATIENT-LVL III: CPT | Mod: PBBFAC,,, | Performed by: ORTHOPAEDIC SURGERY

## 2025-05-01 PROCEDURE — 1159F MED LIST DOCD IN RCRD: CPT | Mod: CPTII,S$GLB,, | Performed by: ORTHOPAEDIC SURGERY

## 2025-05-01 PROCEDURE — 99213 OFFICE O/P EST LOW 20 MIN: CPT | Mod: S$GLB,,, | Performed by: ORTHOPAEDIC SURGERY

## 2025-05-01 RX ORDER — DICLOFENAC SODIUM 75 MG/1
75 TABLET, DELAYED RELEASE ORAL 2 TIMES DAILY PRN
Qty: 60 TABLET | Refills: 2 | Status: SHIPPED | OUTPATIENT
Start: 2025-05-01

## 2025-05-15 ENCOUNTER — PATIENT MESSAGE (OUTPATIENT)
Dept: NEUROSURGERY | Facility: CLINIC | Age: 55
End: 2025-05-15
Payer: COMMERCIAL

## 2025-05-15 NOTE — TELEPHONE ENCOUNTER
Called and spoke with the patient regarding the Ophthalmology consultation. I informed her that Dr. Wyatt requested to see her after both the ENT and Ophthalmology consultations. However, the Ophthalmology appointment has not yet taken place.  The patient insisted on keeping her appointment with Dr. Wyatt on May 20, 2025, and mentioned that her Ophthalmology visit is scheduled for July. I explained that our intent in calling was to ensure all necessary requirements were completed prior to her visit.  She expressed dissatisfaction with [facility]'s care and felt that she was being pushed away. I reassured her that she can see Dr. Wyatt with just the PT and ENT consultations completed. However, we were trying to gather everything Dr. Wyatt requested ahead of time to help avoid the need for an additional visit. Pt v/u and will keep the appt on 5/20/2025

## 2025-05-15 NOTE — PROGRESS NOTES
"Neurosurgery  Established Patient    Scribe Attestation  I, Maria A Felix, attest that this documentation has been prepared under the direction and in the presence of Lilo Wyatt MD.    SUBJECTIVE:     History of Present Illness 07/29/24:  Cheryl Espinosa is a 54 y/o female, PMHx of GERD and carpal tunnel, that is referred to me by Dr. Keith Oden for evaluation of IIH. Today she reports constant increased head pressure that differs from headache pain. Pressure localizes around the forehead area and can travel to back of head. She describes sensation as rhythmic and pulsing, more on L than R. Associated with left-sided facial tingling. Worsens when lying down. She has seen ENT and ophthalmology with unremarkable workup. Patient also saw neurology who worked her up for possible IIH as the cause of her symptoms. Lumbar puncture demonstrated opening pressure of 8. Was put on diamox for 7 days, but d/c due to unwanted side effects. The patient also reports occasional numbness and tingling in b/l hands, forearm and feet. Denies headaches. When asked about vision, pt notes no recent changes. She shares that her L eye vision has always been worse than R eye vision. Denies difficulty chewing or swallowing. No recent MVA. Surgical history includes appendectomy, colposcopy, and endometriosis treatment. Has difficulty waking from anesthesia. Allergic to medrol pack (rash) and some incense (nausea, vomiting).     Interval Hx 03/10/25:  Patient returns to clinic with updated imaging to evaluate for cervical stenosis. Today she reports being more concerned with still having "sharp" and "inflamed" facial pain (L > R) that radiates down her neck and worsens when lying down. Has increased in severity since her last visit and causes discomfort. Pt denies head pain. This has been present since 2022. She also continues to have intermittent L > R ear pressure that she feels has become more sensitive. She plans to follow up " with her ENT again soon. She will also f/u with her ophthalmologist in July.        Interval Hx 05/20/25:  Patient returns to clinic for f/u.    Cheryl presents for a follow-up visit to discuss the progress and effectiveness of physical therapy for ongoing neurological and musculoskeletal symptoms, primarily affecting the upper back, shoulder, and face.    Cheryl reports 80% improvement in her condition following PT sessions with Justyn compared to when she first started treatment. Her initial symptoms involved issues in the left shoulder and shoulder blade area, rash in the neck and shoulder region, and nerve-related symptoms including shocking sensations in the head and facial twitching.    The physical therapy treatment included needling, electrotherapy, band work, and stretches focusing on the upper back and chest. She reports feeling relief, particularly in the head area, after these interventions. She has continued with home exercises and purchased equipment to maintain her progress.    While overall improvement is noted, some persistent symptoms remain. She reports ongoing issues in the left shoulder and shoulder blade area, as well as intermittent twitching and pressure sensations in the left side of the face, particularly around the eye and cheek. The eye on the affected side occasionally cortez and has rolling sensations.    Recently, she has developed new symptoms in the lower back and hip area, possibly related to sciatica. This started during her last PT appointment and has been causing discomfort, especially when sitting on hard surfaces. She also mentions popping sensations in the ear while driving, though this has somewhat subsided.    She notes that stress from work, including a recent major investigation and open enrollment period, may have exacerbated some of her symptoms. She also mentions having allergy-related nasal symptoms in the past week, which may be contributing to some of her facial  discomfort.    Throughout her treatment, she has been trying to avoid injections and additional medications, preferring to manage symptoms through physical therapy and home exercises.    She denies constant sharp pain or papilledema.    Cheryl has a history of allergies.    Cheryl has undergone an angiogram procedure.    Cheryl has undergone an angiogram in the past.      ROS:  Eyes: +eye watering  ENT: +nasal congestion, +sinus pressure, +post nasal drip, +nasal discharge, +ear pressure  Respiratory: +cough  Musculoskeletal: +muscle pain, +pain with movement, +nerve pain  Integumentary: +rash  Neurological: +twitch/tic  Psychiatric: +anxiety  Allergic: +frequent sneezing  Review of patient's allergies indicates:   Allergen Reactions    Doxycycline Anaphylaxis, Nausea And Vomiting and Other (See Comments)    Medrol [methylprednisolone] Shortness Of Breath and Rash    Nsaids (non-steroidal anti-inflammatory drug) Nausea And Vomiting     Nausea        Current Medications[1]    Past Medical History:   Diagnosis Date    Abnormal Pap smear of cervix     Carpal tunnel syndrome     GERD (gastroesophageal reflux disease)     History of degenerative disc disease     Metatarsalgia      Past Surgical History:   Procedure Laterality Date    COLONOSCOPY N/A 11/30/2022    Procedure: COLONOSCOPY;  Surgeon: Jennifer Dupont MD;  Location: [facility];  Service: Endoscopy;  Laterality: N/A;    COLPOSCOPY       Family History       Problem Relation (Age of Onset)    Breast cancer Maternal Grandmother, Maternal Aunt          Social History     Socioeconomic History    Marital status: Single   Tobacco Use    Smoking status: Never    Smokeless tobacco: Never   Substance and Sexual Activity    Alcohol use: Yes    Drug use: No    Sexual activity: Not Currently     Social Drivers of Health     Financial Resource Strain: Low Risk  (3/12/2025)    Received from [facility]    Overall Financial Resource Strain (CARDIA)      Difficulty of Paying Living Expenses: Not hard at all   Food Insecurity: No Food Insecurity (3/12/2025)    Received from [facility]    Hunger Vital Sign     Worried About Running Out of Food in the Last Year: Never true     Ran Out of Food in the Last Year: Never true   Transportation Needs: No Transportation Needs (3/12/2025)    Received from [facility]    PRAPARE - Transportation     Lack of Transportation (Medical): No     Lack of Transportation (Non-Medical): No   Physical Activity: Insufficiently Active (3/12/2025)    Received from [facility]    Exercise Vital Sign     Days of Exercise per Week: 3 days     Minutes of Exercise per Session: 30 min   Stress: Stress Concern Present (3/12/2025)    Received from [facility]    [employer] - Occupational Stress Questionnaire     Feeling of Stress : To some extent   Housing Stability: Unknown (10/11/2024)    Housing Stability Vital Sign     Unable to Pay for Housing in the Last Year: No       Review of Systems   All other systems reviewed and are negative.      OBJECTIVE:     Vital Signs     There is no height or weight on file to calculate BMI.    Neurosurgery Physical Exam  General: no acute distress  Head: Non-traumatic, normocephalic  Eyes: Pupils equal, EOMI  Neck: Supple, normal ROM, no tenderness to palpation  CVS: Normal rate and rhythm, distal pulses present  Pulm: Symmetric expansion, no respiratory distress  GI: Abdomen nondistended, nontender    MSK: Moves all extremities without restriction, atraumatic  Skin: Dry, intact  Psych: Normal thought content and cognition    Neuro:  Alert, awake, oriented, to self, place, time  Language:  Speech is fluent, goal directed without any noted dysarthria or aphasia    Cranial nerves:    CNII-XII: Intact on fine exam,   Pupils equal round react to light,   Extraocular muscles are intact  V1 to V3 is intact to light touch,   no facial asymmetry,   Hearing is intact to finger rub and  voice  tongue/uvula/palate midline,   shoulder shrug equal,     No pronator drift    Extremities:  Motor:  Upper Extremity    Deltoid Triceps Biceps Wrist  Extension Wrist  Flexion Interosseous   R 5/5 5/5 5/5 5/5 5/5 5/5   L 5/5 5/5 5/5 5/5 5/5 5/5       Thumb   Abduction Thumb  ADDuction Finger  Flexion Finger  Extension     R 5/5 5/5 5/5 5/5     L 5/5 5/5 5/5 5/5        Lower Extremity     Iliopsoas Quadriceps Hamstring Plantarflexion Dorsiflexion EHL   R 5/5 5/5 5/5 5/5 5/5 5/5   L 5/5 5/5 5/5 5/5 5/5 5/5       Reflexes:     DTR: 2+ biceps    2+ triceps   2+ brachioradialis   2+ patellar  2+ Achilles     Murillo's: Negative     Babinski's: Negative     Clonus: Negative     Sensory:      Sensation intact to light touch, temperature sensation, vibration, pinprick     Coordination:      Coordination intact throughout, no dysmetria, normal rapid alternating movements, no dysdiadochokinesia  Cerebellar:  Normal finger-to-nose, normal heel-to-shin  Cervical spine:  No midline cervical tenderness, negative Lhermitte, negative Spurling  Thoracic spine:  No midline thoracic tenderness  Lumbar spine:  No midline lumbar tenderness     Diagnostic Results:  I personally reviewed the patient's Diagnostic Imaging.     ASSESSMENT/PLAN:   53 y/o female with cervical stenosis and complicated history of intermittent bilateral ear pressure and worsening facial pains with unremarkable workup for IIH by ENT and ophthalmology.       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  - Considered muscular and nerve involvement, particularly related to spine issues, based on PT findings and interventions.     SHOULDER  AND BACK PAIN:  - Assessed progress after PT, noting 80% improvement in symptoms.  - Determined continuation of current PT regimen beneficial, avoiding more invasive treatments like injections or Botox.  - Cheryl to continue home exercises and stretches as instructed by physical therapist, including: Band work, Using half foam roll for stretching, Nerve glides, Superman exercises.  - Cheryl to maintain PT exercises at work when possible.  - Renewed referral for physical therapy.  - Return to physical therapy as determined appropriate by the physical therapist, potentially once or twice per week.     ALLERGIC RHINITIS:  - Evaluated ongoing facial and eye symptoms, potentially related to allergies.     STRESS-RELATED SYMPTOMS:  - Recognized stress as possible contributing factor to recent symptom exacerbation.     FOLLOW-UP:  - Follow up as needed if symptoms persist or worsen.         Thank you so very much for allowing me to participate in the care of this patient.  Please feel free to call any questions, comments, or concerns.     Lilo Wyatt MD,MSc  Department of Neurosurgery   Department of Radiology  Department of Neurology  [facility]    [facility]   [employer] [facility] [employer]     Total time spent in counseling and discussion about further management options including relevant lab work, treatment,  prognosis, medications and intended side effects was more than 60 minutes. More than 50 % of the time was spent in counseling and coordination of care.  I spent a total of 60 minutes on the day of the visit.This includes face to face time and non-face to face time preparing to see the patient (eg, review of tests), Obtaining and/or reviewing separately obtained history, Documenting clinical information in the electronic or other health record, Independently interpreting resultsand  communicating results to the patient/family/caregiver, or Care coordination.     Scribe Attestation  I, Dr. Lilo Wyatt personally performed the services described in this documentation. All medical record entries made by the scribe, Maria A Felix, were at my direction and in my presence.  I have reviewed the chart and agree that the record reflects my personal performance and is accurate and complete.           [1]   Current Outpatient Medications   Medication Sig Dispense Refill    acetaZOLAMIDE (DIAMOX) 125 MG Tab Take 1 tablet by mouth 2 (two) times daily. (Patient not taking: Reported on 5/1/2025)      amoxicillin (AMOXIL) 500 MG capsule Take one capsule by mouth every 8 hours (Patient not taking: Reported on 5/1/2025) 30 capsule 0    diazePAM (VALIUM) 10 MG Tab 5 mg. (Patient not taking: Reported on 5/1/2025)      diclofenac (VOLTAREN) 75 MG EC tablet 2 (two) times daily. (Patient not taking: Reported on 5/1/2025)      diclofenac (VOLTAREN) 75 MG EC tablet Take 1 tablet (75 mg total) by mouth 2 (two) times daily as needed (back pain). 60 tablet 2    DICLOFENAC-BENZALKONIUM CHLOR TOP Diclofenac      multivit with minerals/lutein (MULTIVITAMIN 50 PLUS ORAL) Multivitamin      omeprazole (PRILOSEC) 40 MG capsule Take 1 capsule (40 mg total) by mouth once daily. 30 capsule 11     No current facility-administered medications for this visit.

## 2025-05-20 ENCOUNTER — OFFICE VISIT (OUTPATIENT)
Dept: NEUROSURGERY | Facility: CLINIC | Age: 55
End: 2025-05-20
Payer: COMMERCIAL

## 2025-05-20 VITALS — HEART RATE: 81 BPM | SYSTOLIC BLOOD PRESSURE: 108 MMHG | DIASTOLIC BLOOD PRESSURE: 71 MMHG

## 2025-05-20 DIAGNOSIS — M54.9 DORSALGIA, UNSPECIFIED: Primary | ICD-10-CM

## 2025-05-20 DIAGNOSIS — M53.86 DECREASED RANGE OF MOTION OF INTERVERTEBRAL DISCS OF LUMBAR SPINE: ICD-10-CM

## 2025-05-20 DIAGNOSIS — G93.2 IIH (IDIOPATHIC INTRACRANIAL HYPERTENSION): ICD-10-CM

## 2025-05-20 DIAGNOSIS — G93.2 BENIGN INTRACRANIAL HYPERTENSION: ICD-10-CM

## 2025-05-20 PROCEDURE — 99215 OFFICE O/P EST HI 40 MIN: CPT | Mod: S$GLB,,, | Performed by: NEUROLOGICAL SURGERY

## 2025-05-20 PROCEDURE — 3078F DIAST BP <80 MM HG: CPT | Mod: CPTII,S$GLB,, | Performed by: NEUROLOGICAL SURGERY

## 2025-05-20 PROCEDURE — 99999 PR PBB SHADOW E&M-EST. PATIENT-LVL III: CPT | Mod: PBBFAC,,, | Performed by: NEUROLOGICAL SURGERY

## 2025-05-20 PROCEDURE — 3074F SYST BP LT 130 MM HG: CPT | Mod: CPTII,S$GLB,, | Performed by: NEUROLOGICAL SURGERY

## 2025-05-20 PROCEDURE — 1159F MED LIST DOCD IN RCRD: CPT | Mod: CPTII,S$GLB,, | Performed by: NEUROLOGICAL SURGERY

## 2025-06-28 DIAGNOSIS — K21.9 GASTROESOPHAGEAL REFLUX DISEASE, UNSPECIFIED WHETHER ESOPHAGITIS PRESENT: ICD-10-CM

## 2025-06-30 RX ORDER — OMEPRAZOLE 40 MG/1
40 CAPSULE, DELAYED RELEASE ORAL DAILY
Qty: 30 CAPSULE | Refills: 11 | Status: SHIPPED | OUTPATIENT
Start: 2025-06-30 | End: 2026-06-30

## 2025-07-02 NOTE — PROGRESS NOTES
Neurosurgery  History & Physical    SUBJECTIVE:     Chief Complaint:      History of Present Illness:     History of Present Illness    Cheryl presents for a follow-up visit to discuss the progress and effectiveness of physical therapy for ongoing neurological and musculoskeletal symptoms, primarily affecting the upper back, shoulder, and face.    Cheryl reports 80% improvement in her condition following PT sessions with Justyn compared to when she first started treatment. Her initial symptoms involved issues in the left shoulder and shoulder blade area, rash in the neck and shoulder region, and nerve-related symptoms including shocking sensations in the head and facial twitching.    The physical therapy treatment included needling, electrotherapy, band work, and stretches focusing on the upper back and chest. She reports feeling relief, particularly in the head area, after these interventions. She has continued with home exercises and purchased equipment to maintain her progress.    While overall improvement is noted, some persistent symptoms remain. She reports ongoing issues in the left shoulder and shoulder blade area, as well as intermittent twitching and pressure sensations in the left side of the face, particularly around the eye and cheek. The eye on the affected side occasionally cortez and has rolling sensations.    Recently, she has developed new symptoms in the lower back and hip area, possibly related to sciatica. This started during her last PT appointment and has been causing discomfort, especially when sitting on hard surfaces. She also mentions popping sensations in the ear while driving, though this has somewhat subsided.    She notes that stress from work, including a recent major investigation and open enrollment period, may have exacerbated some of her symptoms. She also mentions having allergy-related nasal symptoms in the past week, which may be contributing to some of her facial  discomfort.    Throughout her treatment, she has been trying to avoid injections and additional medications, preferring to manage symptoms through physical therapy and home exercises.    She denies constant sharp pain or papilledema.    Cheryl has a history of allergies.    Cehryl has undergone an angiogram procedure.    Cheryl has undergone an angiogram in the past.      ROS:  Eyes: +eye watering  ENT: +nasal congestion, +sinus pressure, +post nasal drip, +nasal discharge, +ear pressure  Respiratory: +cough  Musculoskeletal: +muscle pain, +pain with movement, +nerve pain  Integumentary: +rash  Neurological: +twitch/tic  Psychiatric: +anxiety  Allergic: +frequent sneezing         Review of patient's allergies indicates:   Allergen Reactions    Doxycycline Anaphylaxis, Nausea And Vomiting and Other (See Comments)    Medrol [methylprednisolone] Shortness Of Breath and Rash    Nsaids (non-steroidal anti-inflammatory drug) Nausea And Vomiting     Nausea        Current Medications[1]    Past Medical History:   Diagnosis Date    Abnormal Pap smear of cervix     Carpal tunnel syndrome     GERD (gastroesophageal reflux disease)     History of degenerative disc disease     Metatarsalgia      Past Surgical History:   Procedure Laterality Date    COLONOSCOPY N/A 11/30/2022    Procedure: COLONOSCOPY;  Surgeon: Jennifer Dupont MD;  Location: [facility];  Service: Endoscopy;  Laterality: N/A;    COLPOSCOPY       Family History       Problem Relation (Age of Onset)    Breast cancer Maternal Grandmother, Maternal Aunt          Social History     Socioeconomic History    Marital status: Single   Tobacco Use    Smoking status: Never    Smokeless tobacco: Never   Substance and Sexual Activity    Alcohol use: Yes    Drug use: No    Sexual activity: Not Currently     Social Drivers of Health     Financial Resource Strain: Low Risk  (3/12/2025)    [facility]    Overall Financial Resource Strain (CARDIA)      Difficulty of Paying Living Expenses: Not hard at all   Food Insecurity: No Food Insecurity (3/12/2025)    Received from [facility]    Hunger Vital Sign     Worried About Running Out of Food in the Last Year: Never true     Ran Out of Food in the Last Year: Never true   Transportation Needs: No Transportation Needs (3/12/2025)    Received from [facility]    PRAPARE - Transportation     Lack of Transportation (Medical): No     Lack of Transportation (Non-Medical): No   Physical Activity: Insufficiently Active (3/12/2025)    Received from [facility]    Exercise Vital Sign     Days of Exercise per Week: 3 days     Minutes of Exercise per Session: 30 min   Stress: Stress Concern Present (3/12/2025)    Received from [facility]    [employer] - Occupational Stress Questionnaire     Feeling of Stress : To some extent   Housing Stability: Unknown (10/11/2024)    Housing Stability Vital Sign     Unable to Pay for Housing in the Last Year: No       Review of Systems    OBJECTIVE:     Vital Signs  Pulse: 81  BP: 108/71  Pain Score:   6  There is no height or weight on file to calculate BMI.      Neurosurgery Physical Exam    Physical Exam    General: No acute distress.  Head: Nontraumatic. Normocephalic.  Eyes: Pupils equal. EOMI.  Neck: Supple. Normal ROM. No tenderness to palpation.  CVS: Normal rate and rhythm. Distal pulses present.  Pulm: Symmetric expansion. No respiratory distress.  GI: Abdomen nondistended. Nontender.  MSK: Moves all extremities without restriction. Atraumatic.  Skin: Dry. Intact.  Psych: Normal thought content. Normal cognition.  Neuro: Awake. Alert and oriented to self, place, and time.  Language: Speech is fluent and goal-directed without dysarthria or aphasia.           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  - Considered muscular and nerve involvement, particularly related to spine issues, based on PT findings and interventions.    SHOULDER AND BACK PAIN:  - Assessed progress after PT, noting 80% improvement in symptoms.  - Determined continuation of current PT regimen beneficial, avoiding more invasive treatments like injections or Botox.  - Cheryl to continue home exercises and stretches as instructed by physical therapist, including: Band work, Using half foam roll for stretching, Nerve glides, Superman exercises.  - Cheryl to maintain PT exercises at work when possible.  - Renewed referral for physical therapy.  - Return to physical therapy as determined appropriate by the physical therapist, potentially once or twice per week.    ALLERGIC RHINITIS:  - Evaluated ongoing facial and eye symptoms, potentially related to allergies.    STRESS-RELATED SYMPTOMS:  - Recognized stress as possible contributing factor to recent symptom exacerbation.    FOLLOW-UP:  - Follow up as needed if symptoms persist or worsen.             Note dictated with voice recognition software, please excuse any grammatical errors.  This note was generated with the assistance of ambient listening technology. Verbal consent was obtained by the patient and accompanying visitor(s) for the recording of patient appointment to facilitate this note. I attest to having reviewed and edited the generated note for accuracy, though some syntax or spelling errors may persist. Please contact the author of this note for any clarification.           [1]   Current Outpatient Medications   Medication Sig Dispense Refill    acetaZOLAMIDE (DIAMOX) 125 MG Tab Take 1 tablet by mouth 2 (two) times daily. (Patient not taking: Reported  on 5/1/2025)      amoxicillin (AMOXIL) 500 MG capsule Take one capsule by mouth every 8 hours (Patient not taking: Reported on 5/1/2025) 30 capsule 0    diazePAM (VALIUM) 10 MG Tab 5 mg. (Patient not taking: Reported on 5/1/2025)      diclofenac (VOLTAREN) 75 MG EC tablet 2 (two) times daily. (Patient not taking: Reported on 5/1/2025)      diclofenac (VOLTAREN) 75 MG EC tablet Take 1 tablet (75 mg total) by mouth 2 (two) times daily as needed (back pain). 60 tablet 2    DICLOFENAC-BENZALKONIUM CHLOR TOP Diclofenac      multivit with minerals/lutein (MULTIVITAMIN 50 PLUS ORAL) Multivitamin      omeprazole (PRILOSEC) 40 MG capsule Take 1 capsule (40 mg total) by mouth once daily. 30 capsule 11     No current facility-administered medications for this visit.

## 2025-07-25 ENCOUNTER — HOSPITAL ENCOUNTER (OUTPATIENT)
Dept: RADIOLOGY | Facility: HOSPITAL | Age: 55
Discharge: HOME | End: 2025-07-25
Attending: NURSE PRACTITIONER
Payer: COMMERCIAL

## 2025-07-25 VITALS — HEIGHT: 61 IN | BODY MASS INDEX: 39.46 KG/M2 | WEIGHT: 209 LBS

## 2025-07-25 DIAGNOSIS — R92.333 HETEROGENEOUSLY DENSE TISSUE OF BOTH BREASTS ON MAMMOGRAPHY: ICD-10-CM

## 2025-07-25 DIAGNOSIS — Z91.89 AT HIGH RISK FOR BREAST CANCER: ICD-10-CM

## 2025-07-25 PROCEDURE — 77063 BREAST TOMOSYNTHESIS BI: CPT | Mod: 26,,, | Performed by: RADIOLOGY

## 2025-07-25 PROCEDURE — 77063 BREAST TOMOSYNTHESIS BI: CPT | Mod: TC

## 2025-07-25 PROCEDURE — 77067 SCR MAMMO BI INCL CAD: CPT | Mod: 26,,, | Performed by: RADIOLOGY
